# Patient Record
Sex: MALE | Race: WHITE | Employment: FULL TIME | URBAN - METROPOLITAN AREA
[De-identification: names, ages, dates, MRNs, and addresses within clinical notes are randomized per-mention and may not be internally consistent; named-entity substitution may affect disease eponyms.]

---

## 2017-01-04 ENCOUNTER — OFFICE VISIT (OUTPATIENT)
Dept: ENDOCRINOLOGY | Facility: CLINIC | Age: 52
End: 2017-01-04
Payer: COMMERCIAL

## 2017-01-04 VITALS
SYSTOLIC BLOOD PRESSURE: 152 MMHG | WEIGHT: 315 LBS | HEART RATE: 53 BPM | DIASTOLIC BLOOD PRESSURE: 91 MMHG | HEIGHT: 70 IN | BODY MASS INDEX: 45.1 KG/M2

## 2017-01-04 DIAGNOSIS — I10 UNCONTROLLED HYPERTENSION: ICD-10-CM

## 2017-01-04 DIAGNOSIS — E66.01 MORBID OBESITY, UNSPECIFIED OBESITY TYPE (H): Chronic | ICD-10-CM

## 2017-01-04 DIAGNOSIS — I10 ESSENTIAL HYPERTENSION WITH GOAL BLOOD PRESSURE LESS THAN 140/90: ICD-10-CM

## 2017-01-04 DIAGNOSIS — E04.1 THYROID NODULE: Primary | ICD-10-CM

## 2017-01-04 LAB — HBA1C MFR BLD: 7.4 % (ref 0–5.7)

## 2017-01-04 PROCEDURE — 83036 HEMOGLOBIN GLYCOSYLATED A1C: CPT | Performed by: INTERNAL MEDICINE

## 2017-01-04 PROCEDURE — 99214 OFFICE O/P EST MOD 30 MIN: CPT | Performed by: INTERNAL MEDICINE

## 2017-01-04 PROCEDURE — 36415 COLL VENOUS BLD VENIPUNCTURE: CPT | Performed by: INTERNAL MEDICINE

## 2017-01-04 NOTE — MR AVS SNAPSHOT
After Visit Summary   1/4/2017    Kalia Boateng    MRN: [id number]           Patient Information     Date Of Birth          1965        Visit Information        Provider Department      1/4/2017 3:50 PM Gin Ferreira MD; [facility] NURSE [facility]        Today's Diagnoses     Thyroid nodule    -  1     Type 2 diabetes, uncontrolled, with neuropathy (H)         Morbid obesity, unspecified obesity type (H)         Uncontrolled hypertension           Care Instructions    Fax number for Endocrine Clinic [phone].    [place] will schedule thyroid ultrasound for November.           Instructions for Collection of a 24 hour or Timed Urine Specimen    Your doctor has requested that you collect all of your urine for a 24 hour urine lab test. Please follow the instructions below    1. The day before you are to bring your specimen:  At 7:00am (or when you get up the day) empty your bladder as completely as possible. Discard this specimen.    2. During the 24 hour collection period store the collection container in a cool place or in the refrigerator. Some collections require a special preservative that will be in the container if required.    3. At 7:00am the next day (or when you started your urine collection on the previous day) void and add to the collection container. Record the time and date of the end of the collection period. This completes the 24 hour urine collection.    4. Bring the entire specimen directly to the lab  A lab with your name, medical record number and date of birth must be attached to the urine container.  A lab request form completed by your clinic/doctor with your name medical number number, date of birth and test requested must accompany the urine specimen.  Record on the lab request form the date and time (am/pm) of the start of the urine collection and date and time of the end of the urine collection.    5. If you have any questions, feel free to  call the laboratory at [phone] or [phone] or your clinic.      Check BG at bedtime and in the morning. Refrain from having bedtime snacks. Send us the numbers in 1-2 weeks.           Follow-ups after your visit        Your next 10 appointments already scheduled     Jose 15, 2017  8:00 AM   LAB with NL LAB [facility] ([facility])    [address] [place] [id number] [phone]           Patient must bring picture ID.  Patient should be prepared to give a urine specimen  Please do not eat 10-12 hours before your appointment if you are coming in fasting for labs on lipids, cholesterol, or glucose (sugar).  Pregnant women should follow their Care Team instructions. Water with medications is okay. Do not drink coffee or other fluids.   If you have concerns about taking  your medications, please ask at office or if scheduling via [employer], send a message by clicking on Secure Messaging, Message Your Care Team.            Apr 12, 2017  3:50 PM   Return Visit with Gin Ferreira MD, [facility] END[facility] NURSE   [facility]    [address] [place] [id number]   [phone]            Jul 25, 2017  4:30 PM   Return Visit with Malathi Barrera MD   [facility]    [address] [place] [id number]   [phone]              Future tests that were ordered for you today     Open Future Orders        Priority Expected Expires Ordered    Aldosterone Routine 1/11/2017 1/4/2018 1/4/2017    Renin activity Routine 1/11/2017 1/4/2018 1/4/2017    Potassium Routine 1/11/2017 1/4/2018 1/4/2017    Cortisol free urine Routine 1/18/2017 10/31/2017 1/4/2017    Creatinine timed urine Routine 1/18/2017 12/30/2017 1/4/2017    US Thyroid Routine 11/7/2017 1/4/2018 1/4/2017            Who to contact     If you have questions or need follow up information about  "today's clinic visit or your schedule please contact [facility] directly at [phone].  Normal or non-critical lab and imaging results will be communicated to you by [employer], letter or phone within 4 business days after the clinic has received the results. If you do not hear from us within 7 days, please contact the clinic through [employer] or phone. If you have a critical or abnormal lab result, we will notify you by phone as soon as possible.  Submit refill requests through [employer] or call your pharmacy and they will forward the refill request to us. Please allow 3 business days for your refill to be completed.          Additional Information About Your Visit        [employer] Information     [employer] gives you secure access to your electronic health record. If you see a primary care provider, you can also send messages to your care team and make appointments. If you have questions, please call your primary care clinic.  If you do not have a primary care provider, please call [phone] and they will assist you.      [employer] is an electronic gateway that provides easy, online access to your medical records. With [employer], you can request a clinic appointment, read your test results, renew a prescription or communicate with your care team.     To access your existing account, please contact your [facility] or call [phone] for assistance.        Care EveryWhere ID     This is your Care EveryWhere ID. This could be used by other organizations to access your [facility] medical records  [id number]        Your Vitals Were     Pulse Height BMI (Body Mass Index)             53 1.778 m (5' 10\") 47.16 kg/m2          Blood Pressure from Last 3 Encounters:   01/04/17 152/91   11/08/16 130/71   09/27/16 169/89    Weight from Last 3 Encounters:   01/04/17 149.1 kg (328 lb 11.3 oz)   11/08/16 146.013 kg (321 lb 14.4 oz)   09/27/16 147.7 kg (325 lb 9.9 oz)              We " Performed the Following     Hemoglobin A1c POCT          Today's Medication Changes          These changes are accurate as of: 1/4/17  4:42 PM.  If you have any questions, ask your nurse or doctor.               These medicines have changed or have updated prescriptions.        Dose/Directions    * ACCU-CHEK COMPACT PLUS test strip   This may have changed:  Another medication with the same name was added. Make sure you understand how and when to take each.   Used for:  Type 2 diabetes mellitus without complication (H)   Generic drug:  blood glucose monitoring   Changed by:  Gavin House MD        USE TO TEST FOUR TIMES A DAY OR AS DIRECTED   Quantity:  102 each   Refills:  12       * blood glucose monitoring test strip   Commonly known as:  ONE TOUCH VERIO IQ   This may have changed:  You were already taking a medication with the same name, and this prescription was added. Make sure you understand how and when to take each.   Used for:  Type 2 diabetes, uncontrolled, with neuropathy (H)   Changed by:  Gin Ferreira MD        Use to test blood sugars 3-4 times daily or as directed.   Quantity:  350 strip   Refills:  3       * blood glucose monitoring lancets   This may have changed:  Another medication with the same name was added. Make sure you understand how and when to take each.   Used for:  Type 2 diabetes, HbA1C goal < 8% (H)   Changed by:  Gavin House MD        Use to test blood sugar 4 times daily or as directed.   Quantity:  102 each   Refills:  12       * blood glucose monitoring lancets   This may have changed:  You were already taking a medication with the same name, and this prescription was added. Make sure you understand how and when to take each.   Used for:  Type 2 diabetes, uncontrolled, with neuropathy (H)   Changed by:  Gin Ferreira MD        Use to test blood sugars 3-4 times daily or as directed.   Quantity:  1 Box   Refills:  3       insulin degludec 200  UNIT/ML pen   Commonly known as:  TRESIBA   This may have changed:  how much to take   Used for:  Type 2 diabetes mellitus with diabetic autonomic neuropathy, with long-term current use of insulin (H)        Dose:  25 Units   Inject 25 Units Subcutaneous daily   Quantity:  12 mL   Refills:  3       * Notice:  This list has 4 medication(s) that are the same as other medications prescribed for you. Read the directions carefully, and ask your doctor or other care provider to review them with you.         Where to get your medicines      These medications were sent to [address]  [address], [facility] [address]     Phone:  [phone]    - blood glucose monitoring lancets  - blood glucose monitoring test strip             Primary Care Provider Office Phone # Fax #    Scottie Boudreaux [phone]       [facility] [address] [place] [address]   [facility] [address]        Thank you!     Thank you for choosing [facility]  for your care. Our goal is always to provide you with excellent care. Hearing back from our patients is one way we can continue to improve our services. Please take a few minutes to complete the written survey that you may receive in the mail after your visit with us. Thank you!             Your Updated Medication List - Protect others around you: Learn how to safely use, store and throw away your medicines at www.disposemymeds.org.          This list is accurate as of: 1/4/17  4:42 PM.  Always use your most recent med list.                   Brand Name Dispense Instructions for use    * ACCU-CHEK COMPACT PLUS test strip   Generic drug:  blood glucose monitoring     102 each    USE TO TEST FOUR TIMES A DAY OR AS DIRECTED       * blood glucose monitoring test strip    ONE TOUCH VERIO IQ    350 strip    Use to test blood sugars 3-4 times daily or as directed.       allopurinol 300 MG tablet    ZYLOPRIM    90 tablet    Take 1  tablet (300 mg) by mouth daily       aspirin 81 MG EC tablet     90 tablet    Take 1 tablet (81 mg) by mouth daily       * blood glucose monitoring lancets     102 each    Use to test blood sugar 4 times daily or as directed.       * blood glucose monitoring lancets     1 Box    Use to test blood sugars 3-4 times daily or as directed.       blood glucose monitoring meter device kit     1 kit    Use to test blood sugars 4 times daily or as directed.       canagliflozin 300 MG tablet    INVOKANA    90 tablet    Take 1 tablet (300 mg) by mouth every morning (before breakfast)       carvedilol 25 MG tablet    COREG    120 tablet    Take 0.5 tablets (12.5 mg) by mouth 2 times daily (with meals)       chlorthalidone 25 MG tablet    HYGROTON    45 tablet    TAKE 1/2 TABLET BY MOUTH DAILY       colchicine 0.6 MG tablet      Take  by mouth as needed.       cyclobenzaprine 10 MG tablet    FLEXERIL    60 tablet    Take 1 tablet (10 mg) by mouth 3 times daily as needed for muscle spasms       ezetimibe 10 MG tablet    ZETIA    90 tablet    Take 1 tablet (10 mg) by mouth daily       glipiZIDE 10 MG 24 hr tablet    GLUCOTROL XL    180 tablet    TAKE TWO TABLETS BY MOUTH EVERY DAY       hydrALAZINE 50 MG tablet    APRESOLINE    270 tablet    Take 1 tablet (50 mg) by mouth 3 times daily       insulin degludec 200 UNIT/ML pen    TRESIBA    12 mL    Inject 25 Units Subcutaneous daily       lisinopril 40 MG tablet    PRINIVIL/ZESTRIL    90 tablet    TAKE ONE TABLET BY MOUTH ONCE DAILY       metFORMIN 500 MG 24 hr tablet    GLUCOPHAGE-XR    360 tablet    TAKE 4 TABLETS BY MOUTH WITH DINNER       NOVOFINE 30G X 8 MM   Generic drug:  insulin pen needle     100 each    USE ONCE DAILY OR AS DIRECTED MAX USE TWO TIMES A DAY       order for DME     1 Bottle    Equipment being ordered: CONTROL SOLUTION for checking BS.       order for DME     1 Units    Resmed Aircurve 10 auto bilevel 17/11 cm, Mirage Fx Wide nasal mask w/chin strap.        oxyCODONE 5 MG IR tablet    ROXICODONE    14 tablet    Take 1 tablet (5 mg) by mouth At Bedtime       rosuvastatin 40 MG tablet    CRESTOR    90 tablet    Take 1 tablet (40 mg) by mouth daily       TYLENOL PO      Take 1,500 mg by mouth 2 times daily       * Notice:  This list has 4 medication(s) that are the same as other medications prescribed for you. Read the directions carefully, and ask your doctor or other care provider to review them with you.
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
## 2017-01-04 NOTE — Clinical Note
Please let him know I changed the prescriptions according to his new coverage: crestor with atorvastatin 40 mg daily, invokana with jardiance 25 mg daily and BD needles.
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
## 2017-01-04 NOTE — PATIENT INSTRUCTIONS
Fax number for Endocrine Clinic [phone].    Selma will schedule thyroid ultrasound for November.     Discontinue           Instructions for Collection of a 24 hour or Timed Urine Specimen    Your doctor has requested that you collect all of your urine for a 24 hour urine lab test. Please follow the instructions below    1. The day before you are to bring your specimen:  At 7:00am (or when you get up the day) empty your bladder as completely as possible. Discard this specimen.    2. During the 24 hour collection period store the collection container in a cool place or in the refrigerator. Some collections require a special preservative that will be in the container if required.    3. At 7:00am the next day (or when you started your urine collection on the previous day) void and add to the collection container. Record the time and date of the end of the collection period. This completes the 24 hour urine collection.    4. Bring the entire specimen directly to the lab  A lab with your name, medical record number and date of birth must be attached to the urine container.  A lab request form completed by your clinic/doctor with your name medical number number, date of birth and test requested must accompany the urine specimen.  Record on the lab request form the date and time (am/pm) of the start of the urine collection and date and time of the end of the urine collection.    5. If you have any questions, feel free to call the laboratory at [phone] or [phone] or your clinic.      Check BG at bedtime and in the morning. Refrain from having bedtime snacks. Send us the numbers in 1-2 weeks.

## 2017-01-04 NOTE — NURSING NOTE
"Kalia Boateng's goals for this visit include: Follow up Diabetes  He requests these members of his care team be copied on today's visit information: YES    PCP: Scottie Boudreaux    Referring Provider:  Scottie Boudreaux MD  [facility]  [address] [place] [address]    Chief Complaint   Patient presents with     Diabetes       Initial Ht 1.778 m (5' 10\")  Wt 149.1 kg (328 lb 11.3 oz)  BMI 47.16 kg/m2 Estimated body mass index is 47.16 kg/(m^2) as calculated from the following:    Height as of this encounter: 1.778 m (5' 10\").    Weight as of this encounter: 149.1 kg (328 lb 11.3 oz).  BP completed using cuff size: large    Do you need any medication refills at today's visit? NO    "

## 2017-01-06 RX ORDER — CHLORTHALIDONE 25 MG/1
25 TABLET ORAL DAILY
Qty: 90 TABLET | Refills: 3 | Status: SHIPPED | OUTPATIENT
Start: 2017-01-06 | End: 2018-01-23

## 2017-01-10 ENCOUNTER — TELEPHONE (OUTPATIENT)
Dept: ENDOCRINOLOGY | Facility: CLINIC | Age: 52
End: 2017-01-10

## 2017-01-10 RX ORDER — ATORVASTATIN CALCIUM 40 MG/1
40 TABLET, FILM COATED ORAL DAILY
Qty: 90 TABLET | Refills: 3 | Status: SHIPPED | OUTPATIENT
Start: 2017-01-10 | End: 2018-01-23

## 2017-01-10 NOTE — TELEPHONE ENCOUNTER
Received message from Dr. Ferreira as follows:    Please let him know I changed the prescriptions according to his new coverage: crestor with atorvastatin 40 mg daily, invokana with jardiance 25 mg daily and BD needles.       Patient advised. Patient verbalizes understanding and agrees to plan. Patient has a supply of the Crestor currently and will finish out that first before switching to the atorvastatin 40 mg daily.       Selma Stevenson RN  Endocrine Care Coordinator  [facility]

## 2017-01-15 DIAGNOSIS — I10 UNCONTROLLED HYPERTENSION: ICD-10-CM

## 2017-01-15 DIAGNOSIS — E66.01 MORBID OBESITY, UNSPECIFIED OBESITY TYPE (H): Chronic | ICD-10-CM

## 2017-01-15 LAB
COLLECT DURATION TIME UR: 24 H
CREAT 24H UR-MRATE: 1.27 G/(24.H) (ref 1–2)
CREAT UR-MCNC: 64 MG/DL
POTASSIUM SERPL-SCNC: 3.5 MMOL/L (ref 3.4–5.3)
SPECIMEN VOL UR: 2975 ML

## 2017-01-15 PROCEDURE — 84132 ASSAY OF SERUM POTASSIUM: CPT | Performed by: INTERNAL MEDICINE

## 2017-01-15 PROCEDURE — 99000 SPECIMEN HANDLING OFFICE-LAB: CPT | Performed by: INTERNAL MEDICINE

## 2017-01-15 PROCEDURE — 82570 ASSAY OF URINE CREATININE: CPT | Performed by: INTERNAL MEDICINE

## 2017-01-15 PROCEDURE — 82530 CORTISOL FREE: CPT | Mod: 90 | Performed by: INTERNAL MEDICINE

## 2017-01-15 PROCEDURE — 84244 ASSAY OF RENIN: CPT | Mod: 90 | Performed by: INTERNAL MEDICINE

## 2017-01-15 PROCEDURE — 36415 COLL VENOUS BLD VENIPUNCTURE: CPT | Performed by: INTERNAL MEDICINE

## 2017-01-15 PROCEDURE — 82088 ASSAY OF ALDOSTERONE: CPT | Mod: 90 | Performed by: INTERNAL MEDICINE

## 2017-01-15 PROCEDURE — 81050 URINALYSIS VOLUME MEASURE: CPT | Performed by: INTERNAL MEDICINE

## 2017-01-16 ENCOUNTER — OFFICE VISIT (OUTPATIENT)
Dept: FAMILY MEDICINE | Facility: CLINIC | Age: 52
End: 2017-01-16
Payer: COMMERCIAL

## 2017-01-16 VITALS
WEIGHT: 315 LBS | SYSTOLIC BLOOD PRESSURE: 112 MMHG | RESPIRATION RATE: 16 BRPM | BODY MASS INDEX: 46.17 KG/M2 | HEART RATE: 64 BPM | DIASTOLIC BLOOD PRESSURE: 60 MMHG | TEMPERATURE: 98.7 F

## 2017-01-16 DIAGNOSIS — Z79.4 TYPE 2 DIABETES MELLITUS WITH DIABETIC AUTONOMIC NEUROPATHY, WITH LONG-TERM CURRENT USE OF INSULIN (H): ICD-10-CM

## 2017-01-16 DIAGNOSIS — E66.01 MORBID OBESITY, UNSPECIFIED OBESITY TYPE (H): Chronic | ICD-10-CM

## 2017-01-16 DIAGNOSIS — R22.0 MASS OF FACE: Primary | ICD-10-CM

## 2017-01-16 DIAGNOSIS — E11.43 TYPE 2 DIABETES MELLITUS WITH DIABETIC AUTONOMIC NEUROPATHY, WITH LONG-TERM CURRENT USE OF INSULIN (H): ICD-10-CM

## 2017-01-16 DIAGNOSIS — G47.33 OSA (OBSTRUCTIVE SLEEP APNEA): Chronic | ICD-10-CM

## 2017-01-16 PROCEDURE — 99214 OFFICE O/P EST MOD 30 MIN: CPT | Performed by: FAMILY MEDICINE

## 2017-01-16 ASSESSMENT — PAIN SCALES - GENERAL: PAINLEVEL: NO PAIN (0)

## 2017-01-16 NOTE — NURSING NOTE
"Chief Complaint   Patient presents with     Lesion     consult for removal       Initial /60 mmHg  Pulse 64  Temp(Src) 98.7  F (37.1  C) (Tympanic)  Resp 16  Wt 321 lb 12 oz (145.945 kg) Estimated body mass index is 46.17 kg/(m^2) as calculated from the following:    Height as of 1/4/17: 5' 10\" (1.778 m).    Weight as of this encounter: 321 lb 12 oz (145.945 kg).  BP completed using cuff size: mathieu Bernal CMA (AAMA)   "

## 2017-01-16 NOTE — MR AVS SNAPSHOT
After Visit Summary   1/16/2017    Kalia Boateng    MRN: [id number]           Patient Information     Date Of Birth          1965        Visit Information        Provider Department      1/16/2017 3:45 PM Scottie Boudreaux MD [facility]        Today's Diagnoses     Mass of face    -  1     CYRIL (obstructive sleep apnea) Severe            Follow-ups after your visit        Additional Services     GENERAL SURG ADULT REFERRAL       Your provider has referred you to: [facility]: [facility] [phone]   [email or web address]    Please be aware that coverage of these services is subject to the terms and limitations of your health insurance plan.  Call member services at your health plan with any benefit or coverage questions.      Please bring the following with you to your appointment:    (1) Any X-Rays, CTs or MRIs which have been performed.  Contact the facility where they were done to arrange for  prior to your scheduled appointment.   (2) List of current medications   (3) This referral request   (4) Any documents/labs given to you for this referral                  Your next 10 appointments already scheduled     Jan 17, 2017  4:00 PM   New Visit with Haritha Newby MD   [facility] ([facility])    [address] [address]  [facility] [id number] [phone]            Apr 12, 2017  3:50 PM   Return Visit with Gin Ferreira MD, MG ENDO NURSE   [facility]    [address] [facility] [address] [phone]            Jul 25, 2017  4:30 PM   Return Visit with Malathi Barrera MD   [facility] ([facility])    [address] [facility] [address] [phone]              Who to contact     If you have questions or need follow up information about today's clinic visit  or your schedule please contact [facility] directly at [phone].  Normal or non-critical lab and imaging results will be communicated to you by [employer], letter or phone within 4 business days after the clinic has received the results. If you do not hear from us within 7 days, please contact the clinic through [employer] or phone. If you have a critical or abnormal lab result, we will notify you by phone as soon as possible.  Submit refill requests through [employer] or call your pharmacy and they will forward the refill request to us. Please allow 3 business days for your refill to be completed.          Additional Information About Your Visit        [employer] Information     [employer] gives you secure access to your electronic health record. If you see a primary care provider, you can also send messages to your care team and make appointments. If you have questions, please call your primary care clinic.  If you do not have a primary care provider, please call [phone] and they will assist you.        Care EveryWhere ID     This is your Care EveryWhere ID. This could be used by other organizations to access your [place] medical records  [id number]        Your Vitals Were     Pulse Temperature Respirations             64 98.7  F (37.1  C) (Tympanic) 16          Blood Pressure from Last 3 Encounters:   01/16/17 112/60   01/04/17 152/91   11/08/16 130/71    Weight from Last 3 Encounters:   01/16/17 321 lb 12 oz (145.945 kg)   01/04/17 328 lb 11.3 oz (149.1 kg)   11/08/16 321 lb 14.4 oz (146.013 kg)              We Performed the Following     GENERAL SURG ADULT REFERRAL          Today's Medication Changes          These changes are accurate as of: 1/16/17  4:37 PM.  If you have any questions, ask your nurse or doctor.               These medicines have changed or have updated prescriptions.        Dose/Directions    insulin degludec 200 UNIT/ML pen   Commonly known as:  TRESIBA   This may have changed:   how much to take   Used for:  Type 2 diabetes mellitus with diabetic autonomic neuropathy, with long-term current use of insulin (H)        Dose:  25 Units   Inject 25 Units Subcutaneous daily   Quantity:  12 mL   Refills:  3         Stop taking these medicines if you haven't already. Please contact your care team if you have questions.     ezetimibe 10 MG tablet   Commonly known as:  ZETIA   Stopped by:  Scottie Boudreaux MD                    Primary Care Provider Office Phone # Fax #    Scottie Boudreaux [phone]       [address] [place] [address]        Thank you!     Thank you for choosing [facility]  for your care. Our goal is always to provide you with excellent care. Hearing back from our patients is one way we can continue to improve our services. Please take a few minutes to complete the written survey that you may receive in the mail after your visit with us. Thank you!             Your Updated Medication List - Protect others around you: Learn how to safely use, store and throw away your medicines at www.disposemymeds.org.          This list is accurate as of: 1/16/17  4:37 PM.  Always use your most recent med list.                   Brand Name Dispense Instructions for use    * ACCU-CHEK COMPACT PLUS test strip   Generic drug:  blood glucose monitoring     102 each    USE TO TEST FOUR TIMES A DAY OR AS DIRECTED       * blood glucose monitoring test strip    ONE TOUCH VERIO IQ    350 strip    Use to test blood sugars 3-4 times daily or as directed.       allopurinol 300 MG tablet    ZYLOPRIM    90 tablet    Take 1 tablet (300 mg) by mouth daily       aspirin 81 MG EC tablet     90 tablet    Take 1 tablet (81 mg) by mouth daily       atorvastatin 40 MG tablet    LIPITOR    90 tablet    Take 1 tablet (40 mg) by mouth daily       * blood glucose monitoring lancets     102 each    Use to test blood sugar 4 times daily or as  directed.       * blood glucose monitoring lancets     1 Box    Use to test blood sugars 3-4 times daily or as directed.       blood glucose monitoring meter device kit     1 kit    Use to test blood sugars 4 times daily or as directed.       carvedilol 25 MG tablet    COREG    120 tablet    Take 0.5 tablets (12.5 mg) by mouth 2 times daily (with meals)       chlorthalidone 25 MG tablet    HYGROTON    90 tablet    Take 1 tablet (25 mg) by mouth daily       colchicine 0.6 MG tablet      Take  by mouth as needed.       cyclobenzaprine 10 MG tablet    FLEXERIL    60 tablet    Take 1 tablet (10 mg) by mouth 3 times daily as needed for muscle spasms       empagliflozin 25 MG Tabs tablet    JARDIANCE    90 tablet    Take 1 tablet (25 mg) by mouth daily       glipiZIDE 10 MG 24 hr tablet    GLUCOTROL XL    180 tablet    TAKE TWO TABLETS BY MOUTH EVERY DAY       hydrALAZINE 50 MG tablet    APRESOLINE    270 tablet    Take 1 tablet (50 mg) by mouth 3 times daily       insulin degludec 200 UNIT/ML pen    TRESIBA    12 mL    Inject 25 Units Subcutaneous daily       insulin pen needle 31G X 8 MM    B-D U/F    100 each    Use once daily or as directed.       lisinopril 40 MG tablet    PRINIVIL/ZESTRIL    90 tablet    TAKE ONE TABLET BY MOUTH ONCE DAILY       metFORMIN 500 MG 24 hr tablet    GLUCOPHAGE-XR    360 tablet    TAKE 4 TABLETS BY MOUTH WITH DINNER       order for DME     1 Bottle    Equipment being ordered: CONTROL SOLUTION for checking BS.       order for DME     1 Units    Resmed Aircurve 10 auto bilevel 17/11 cm, Mirage Fx Wide nasal mask w/chin strap.       oxyCODONE 5 MG IR tablet    ROXICODONE    14 tablet    Take 1 tablet (5 mg) by mouth At Bedtime       TYLENOL PO      Take 1,500 mg by mouth 2 times daily       * Notice:  This list has 4 medication(s) that are the same as other medications prescribed for you. Read the directions carefully, and ask your doctor or other care provider to review them with you.

## 2017-01-16 NOTE — PROGRESS NOTES
SUBJECTIVE:                                                    Kalia Boateng is a 51 year old male who presents to clinic today for the following health issues:      Chief Complaint   Patient presents with     Lesion     consult for removal       Problem list and histories reviewed & adjusted, as indicated.  Additional history: as documented    Normally has mass on right side of face on medial side of cheek where his new CPAP mask seals.  Last week used larger full face CPAP mask to replace nose CPAP device (nose device was not working).  After 1-2 nights, the mass on his face enlarged in size.  He stopped using CPAP mask and squeezed lesion and got some clear discharge from the lesion.  After 4 days, the mass went back to pre-CPAP mask size.  He used mask again last night and mass enlarged in size again to the same size as last week.      Mass has been present since age 16.      History of diabetes     A1C      7.4   1/4/2017  A1C      7.8   9/27/2016  A1C      8.3   6/22/2016  A1C      8.2   6/21/2016  A1C      7.7   2/17/2016     ROS:  General: negative for, fever, chills  Skin: as above  Resp: No shortness of breath, dyspnea on exertion, cough, or hemoptysis  GI: negative for, nausea and vomiting    OBJECTIVE:                                                    /60 mmHg  Pulse 64  Temp(Src) 98.7  F (37.1  C) (Tympanic)  Resp 16  Wt 321 lb 12 oz (145.945 kg)  Body mass index is 46.17 kg/(m^2).  GENERAL APPEARANCE: morbidly obese, alert and no distress  RESP: lungs clear to auscultation - no rales, rhonchi or wheezes  CV: regular rates and rhythm, normal S1 S2, no S3 or S4 and no murmur, click or rub  SKIN: nodular lesion on right side of face just lateral to upper lip and in area of where CPAP mask would fit.  No discharge noted.            ASSESSMENT/PLAN:                                                        ICD-10-CM    1. Mass of face R22.0 GENERAL SURG ADULT REFERRAL   2. CYRIL (obstructive sleep  apnea) Severe G47.33      PLAN:  1.  This appears to be lesion that will need evaluation from general surgery for evaluation and excision.  I explained to patient that this is more complicated than what primary care should handle and will take surgical expertise.  Referral made.   2.  He should contact sleep clinic to discuss alternatives to his current CPAP mask.     Scottie Boudreaux MD   [facility]

## 2017-01-17 ENCOUNTER — OFFICE VISIT (OUTPATIENT)
Dept: SURGERY | Facility: CLINIC | Age: 52
End: 2017-01-17
Payer: COMMERCIAL

## 2017-01-17 VITALS — BODY MASS INDEX: 45.1 KG/M2 | HEIGHT: 70 IN | WEIGHT: 315 LBS | TEMPERATURE: 98.3 F

## 2017-01-17 DIAGNOSIS — R22.0 LUMP ON FACE: Primary | ICD-10-CM

## 2017-01-17 LAB — ALDOST SERPL-MCNC: 15.7 NG/DL

## 2017-01-17 PROCEDURE — 99242 OFF/OP CONSLTJ NEW/EST SF 20: CPT | Performed by: SURGERY

## 2017-01-17 RX ORDER — CEPHALEXIN 500 MG/1
500 CAPSULE ORAL 3 TIMES DAILY
Qty: 30 CAPSULE | Refills: 0 | Status: SHIPPED | OUTPATIENT
Start: 2017-01-17 | End: 2017-04-12

## 2017-01-17 NOTE — NURSING NOTE
"Chief Complaint   Patient presents with     Consult     mass on right face       Initial Temp(Src) 98.3  F (36.8  C) (Skin)  Ht 5' 10\" (1.778 m)  Wt 322 lb (146.058 kg)  BMI 46.20 kg/m2 Estimated body mass index is 46.2 kg/(m^2) as calculated from the following:    Height as of this encounter: 5' 10\" (1.778 m).    Weight as of this encounter: 322 lb (146.058 kg).  BP completed using cuff size: NA (Not Taken)  Clive GALAVIZ CMA      "

## 2017-01-17 NOTE — MR AVS SNAPSHOT
After Visit Summary   1/17/2017    Kalia Boateng    MRN: [id number]           Patient Information     Date Of Birth          1965        Visit Information        Provider Department      1/17/2017 4:00 PM Haritha Newby MD [facility]        Today's Diagnoses     Lump on face    -  1        Follow-ups after your visit        Your next 10 appointments already scheduled     Jan 23, 2017 11:45 AM   Return Visit with Haritha Newby MD   [facility] ([facility]    [address] [place] [address]   [phone]            Apr 12, 2017  3:50 PM   Return Visit with Gin Ferreira MD, MG ENDO NURSE   [facility]    [address] [place] [address] [phone]            Jul 25, 2017  4:30 PM   Return Visit with Malathi Barrera MD   [facility]    [address] [place] [address] [phone]              Who to contact     If you have questions or need follow up information about today's clinic visit or your schedule please contact [facility] directly at [phone].  Normal or non-critical lab and imaging results will be communicated to you by MyChart, letter or phone within 4 business days after the clinic has received the results. If you do not hear from us within 7 days, please contact the clinic through MyChart or phone. If you have a critical or abnormal lab result, we will notify you by phone as soon as possible.  Submit refill requests through [employer] or call your pharmacy and they will forward the refill request to us. Please allow 3 business days for your refill to be completed.          Additional Information About Your Visit        [employer] Information     [employer] gives you secure access to your electronic health record. If you see a primary care provider, you can  "also send messages to your care team and make appointments. If you have questions, please call your primary care clinic.  If you do not have a primary care provider, please call [phone] and they will assist you.        Care EveryWhere ID     This is your Care EveryWhere ID. This could be used by other organizations to access your [place] medical records  [id number]        Your Vitals Were     Temperature Height BMI (Body Mass Index)             98.3  F (36.8  C) (Skin) 5' 10\" (1.778 m) 46.20 kg/m2          Blood Pressure from Last 3 Encounters:   01/16/17 112/60   01/04/17 152/91   11/08/16 130/71    Weight from Last 3 Encounters:   01/17/17 322 lb (146.058 kg)   01/16/17 321 lb 12 oz (145.945 kg)   01/04/17 328 lb 11.3 oz (149.1 kg)              Today, you had the following     No orders found for display         Today's Medication Changes          These changes are accurate as of: 1/17/17  4:36 PM.  If you have any questions, ask your nurse or doctor.               Start taking these medicines.        Dose/Directions    cephALEXin 500 MG capsule   Commonly known as:  KEFLEX   Used for:  Lump on face        Dose:  500 mg   Take 1 capsule (500 mg) by mouth 3 times daily   Quantity:  30 capsule   Refills:  0         These medicines have changed or have updated prescriptions.        Dose/Directions    insulin degludec 200 UNIT/ML pen   Commonly known as:  TRESIBA   This may have changed:  how much to take   Used for:  Type 2 diabetes mellitus with diabetic autonomic neuropathy, with long-term current use of insulin (H)        Dose:  25 Units   Inject 25 Units Subcutaneous daily   Quantity:  12 mL   Refills:  3            Where to get your medicines      These medications were sent to [facility] - [place], [place] - [address]  [address], [facility] [address]     Phone:  [phone]    - cephALEXin 500 MG capsule             Primary Care Provider Office Phone # Fax #    Scottie Boudreaux MD " [phone]       [facility] [place] [address] [place] [address]        Thank you!     Thank you for choosing [facility]  for your care. Our goal is always to provide you with excellent care. Hearing back from our patients is one way we can continue to improve our services. Please take a few minutes to complete the written survey that you may receive in the mail after your visit with us. Thank you!             Your Updated Medication List - Protect others around you: Learn how to safely use, store and throw away your medicines at www.disposemymeds.org.          This list is accurate as of: 1/17/17  4:36 PM.  Always use your most recent med list.                   Brand Name Dispense Instructions for use    * ACCU-CHEK COMPACT PLUS test strip   Generic drug:  blood glucose monitoring     102 each    USE TO TEST FOUR TIMES A DAY OR AS DIRECTED       * blood glucose monitoring test strip    ONE TOUCH VERIO IQ    350 strip    Use to test blood sugars 3-4 times daily or as directed.       allopurinol 300 MG tablet    ZYLOPRIM    90 tablet    Take 1 tablet (300 mg) by mouth daily       aspirin 81 MG EC tablet     90 tablet    Take 1 tablet (81 mg) by mouth daily       atorvastatin 40 MG tablet    LIPITOR    90 tablet    Take 1 tablet (40 mg) by mouth daily       * blood glucose monitoring lancets     102 each    Use to test blood sugar 4 times daily or as directed.       * blood glucose monitoring lancets     1 Box    Use to test blood sugars 3-4 times daily or as directed.       blood glucose monitoring meter device kit     1 kit    Use to test blood sugars 4 times daily or as directed.       carvedilol 25 MG tablet    COREG    120 tablet    Take 0.5 tablets (12.5 mg) by mouth 2 times daily (with meals)       cephALEXin 500 MG capsule    KEFLEX    30 capsule    Take 1 capsule (500 mg) by mouth 3 times daily       chlorthalidone 25 MG tablet    HYGROTON    90 tablet    Take 1  tablet (25 mg) by mouth daily       colchicine 0.6 MG tablet      Take  by mouth as needed.       cyclobenzaprine 10 MG tablet    FLEXERIL    60 tablet    Take 1 tablet (10 mg) by mouth 3 times daily as needed for muscle spasms       empagliflozin 25 MG Tabs tablet    JARDIANCE    90 tablet    Take 1 tablet (25 mg) by mouth daily       glipiZIDE 10 MG 24 hr tablet    GLUCOTROL XL    180 tablet    TAKE TWO TABLETS BY MOUTH EVERY DAY       hydrALAZINE 50 MG tablet    APRESOLINE    270 tablet    Take 1 tablet (50 mg) by mouth 3 times daily       insulin degludec 200 UNIT/ML pen    TRESIBA    12 mL    Inject 25 Units Subcutaneous daily       insulin pen needle 31G X 8 MM    B-D U/F    100 each    Use once daily or as directed.       lisinopril 40 MG tablet    PRINIVIL/ZESTRIL    90 tablet    TAKE ONE TABLET BY MOUTH ONCE DAILY       metFORMIN 500 MG 24 hr tablet    GLUCOPHAGE-XR    360 tablet    TAKE 4 TABLETS BY MOUTH WITH DINNER       order for DME     1 Bottle    Equipment being ordered: CONTROL SOLUTION for checking BS.       order for DME     1 Units    Resmed Aircurve 10 auto bilevel 17/11 cm, Mirage Fx Wide nasal mask w/chin strap.       oxyCODONE 5 MG IR tablet    ROXICODONE    14 tablet    Take 1 tablet (5 mg) by mouth At Bedtime       TYLENOL PO      Take 1,500 mg by mouth 2 times daily       * Notice:  This list has 4 medication(s) that are the same as other medications prescribed for you. Read the directions carefully, and ask your doctor or other care provider to review them with you.

## 2017-01-18 LAB
COLLECT DURATION TIME SPEC: 24 H
CORTIS F 24H UR HPLC-MCNC: 9.57 UG/ML
CORTIS F 24H UR-MRATE: 28.5
CORTIS F/CREAT 24H UR: 16.5
CREAT 24H UR-MCNC: 58 MG/DL
CREAT 24H UR-MRATE: 1726 G/(24.H)
IMP & REVIEW OF LAB RESULTS: NORMAL
RENIN PLAS-CCNC: 0.7 NG/ML/H
SPECIMEN VOL ?TM UR: 2975 ML

## 2017-01-22 PROBLEM — Z79.4 TYPE 2 DIABETES MELLITUS WITH DIABETIC AUTONOMIC NEUROPATHY, WITH LONG-TERM CURRENT USE OF INSULIN (H): Status: ACTIVE | Noted: 2017-01-22

## 2017-01-22 PROBLEM — E11.43 TYPE 2 DIABETES MELLITUS WITH DIABETIC AUTONOMIC NEUROPATHY, WITH LONG-TERM CURRENT USE OF INSULIN (H): Status: ACTIVE | Noted: 2017-01-22

## 2017-01-23 ENCOUNTER — OFFICE VISIT (OUTPATIENT)
Dept: SURGERY | Facility: CLINIC | Age: 52
End: 2017-01-23
Payer: COMMERCIAL

## 2017-01-23 ENCOUNTER — TELEPHONE (OUTPATIENT)
Dept: SURGERY | Facility: OTHER | Age: 52
End: 2017-01-23

## 2017-01-23 VITALS — WEIGHT: 315 LBS | OXYGEN SATURATION: 96 % | TEMPERATURE: 96.8 F | BODY MASS INDEX: 46.06 KG/M2 | HEART RATE: 93 BPM

## 2017-01-23 DIAGNOSIS — L98.9 LESION OF SKIN OF FACE: Primary | ICD-10-CM

## 2017-01-23 PROCEDURE — 99203 OFFICE O/P NEW LOW 30 MIN: CPT | Performed by: SPECIALIST

## 2017-01-23 NOTE — NURSING NOTE
"    Chief Complaint   Patient presents with     Lesion     facial lesion, possible mole     Consult     referring pau       Initial Pulse 93  Temp(Src) 96.8  F (36  C) (Temporal)  Wt 321 lb (145.605 kg)  SpO2 96% Estimated body mass index is 46.06 kg/(m^2) as calculated from the following:    Height as of 1/17/17: 5' 10\" (1.778 m).    Weight as of this encounter: 321 lb (145.605 kg)..  BP completed using cuff size: NA (Not Taken)      Ijeoma Griffith CMA  (AAMA)      "

## 2017-01-23 NOTE — NURSING NOTE
[facility]    Kalia Boateng has been given the following teaching information:    Instructions for Showering or Bathing before Surgery  Request for surgery sheet faxed to [facility] at [facility]

## 2017-01-23 NOTE — MR AVS SNAPSHOT
After Visit Summary   1/23/2017    Kalia Boateng    MRN: [id number]           Patient Information     Date Of Birth          1965        Visit Information        Provider Department      1/23/2017 1:30 PM Bhanu Graham MD [facility]        Today's Diagnoses     Lesion of skin of face    -  1        Follow-ups after your visit        Your next 10 appointments already scheduled     Jan 25, 2017  4:30 PM   Return Visit with Bhanu Graham MD   [facility]    [address] [address]   [phone]            Apr 12, 2017  3:50 PM   Return Visit with Gin Ferreira MD, MG ENDO NURSE   [facility]    [address] [place] [address] [phone]            Jul 25, 2017  4:30 PM   Return Visit with Malathi Barrera MD   [facility] ([facility]    [address] [place] [address] [phone]              Who to contact     If you have questions or need follow up information about today's clinic visit or your schedule please contact [facility] directly at [phone].  Normal or non-critical lab and imaging results will be communicated to you by MyChart, letter or phone within 4 business days after the clinic has received the results. If you do not hear from us within 7 days, please contact the clinic through MyChart or phone. If you have a critical or abnormal lab result, we will notify you by phone as soon as possible.  Submit refill requests through [email or web address] or call your pharmacy and they will forward the refill request to us. Please allow 3 business days for your refill to be completed.          Additional Information About Your Visit        [employer] Information     [email or web address] gives you secure access to your electronic health record. If you see a primary care provider, you  can also send messages to your care team and make appointments. If you have questions, please call your primary care clinic.  If you do not have a primary care provider, please call [phone] and they will assist you.        Care EveryWhere ID     This is your Care EveryWhere ID. This could be used by other organizations to access your [place] medical records  [id number]        Your Vitals Were     Pulse Temperature Pulse Oximetry             93 96.8  F (36  C) (Temporal) 96%          Blood Pressure from Last 3 Encounters:   01/16/17 112/60   01/04/17 152/91   11/08/16 130/71    Weight from Last 3 Encounters:   01/23/17 321 lb (145.605 kg)   01/17/17 322 lb (146.058 kg)   01/16/17 321 lb 12 oz (145.945 kg)              Today, you had the following     No orders found for display         Today's Medication Changes          These changes are accurate as of: 1/23/17  1:59 PM.  If you have any questions, ask your nurse or doctor.               These medicines have changed or have updated prescriptions.        Dose/Directions    insulin degludec 200 UNIT/ML pen   Commonly known as:  TRESIBA   This may have changed:  how much to take   Used for:  Type 2 diabetes mellitus with diabetic autonomic neuropathy, with long-term current use of insulin (H)        Dose:  25 Units   Inject 25 Units Subcutaneous daily   Quantity:  12 mL   Refills:  3                Primary Care Provider Office Phone # Fax #    Scottie Boudreaux [phone]       [facility] [place] [address]   [place] [address]        Thank you!     Thank you for choosing [facility]  for your care. Our goal is always to provide you with excellent care. Hearing back from our patients is one way we can continue to improve our services. Please take a few minutes to complete the written survey that you may receive in the mail after your visit with us. Thank you!             Your Updated Medication List - Protect  others around you: Learn how to safely use, store and throw away your medicines at www.disposemymeds.org.          This list is accurate as of: 1/23/17  1:59 PM.  Always use your most recent med list.                   Brand Name Dispense Instructions for use    * ACCU-CHEK COMPACT PLUS test strip   Generic drug:  blood glucose monitoring     102 each    USE TO TEST FOUR TIMES A DAY OR AS DIRECTED       * blood glucose monitoring test strip    ONE TOUCH VERIO IQ    350 strip    Use to test blood sugars 3-4 times daily or as directed.       allopurinol 300 MG tablet    ZYLOPRIM    90 tablet    Take 1 tablet (300 mg) by mouth daily       aspirin 81 MG EC tablet     90 tablet    Take 1 tablet (81 mg) by mouth daily       atorvastatin 40 MG tablet    LIPITOR    90 tablet    Take 1 tablet (40 mg) by mouth daily       * blood glucose monitoring lancets     102 each    Use to test blood sugar 4 times daily or as directed.       * blood glucose monitoring lancets     1 Box    Use to test blood sugars 3-4 times daily or as directed.       blood glucose monitoring meter device kit     1 kit    Use to test blood sugars 4 times daily or as directed.       carvedilol 25 MG tablet    COREG    120 tablet    Take 0.5 tablets (12.5 mg) by mouth 2 times daily (with meals)       cephALEXin 500 MG capsule    KEFLEX    30 capsule    Take 1 capsule (500 mg) by mouth 3 times daily       chlorthalidone 25 MG tablet    HYGROTON    90 tablet    Take 1 tablet (25 mg) by mouth daily       colchicine 0.6 MG tablet      Take  by mouth as needed.       cyclobenzaprine 10 MG tablet    FLEXERIL    60 tablet    Take 1 tablet (10 mg) by mouth 3 times daily as needed for muscle spasms       empagliflozin 25 MG Tabs tablet    JARDIANCE    90 tablet    Take 1 tablet (25 mg) by mouth daily       glipiZIDE 10 MG 24 hr tablet    GLUCOTROL XL    180 tablet    TAKE TWO TABLETS BY MOUTH EVERY DAY       hydrALAZINE 50 MG tablet    APRESOLINE    270 tablet     Take 1 tablet (50 mg) by mouth 3 times daily       insulin degludec 200 UNIT/ML pen    TRESIBA    12 mL    Inject 25 Units Subcutaneous daily       insulin pen needle 31G X 8 MM    B-D U/F    100 each    Use once daily or as directed.       lisinopril 40 MG tablet    PRINIVIL/ZESTRIL    90 tablet    TAKE ONE TABLET BY MOUTH ONCE DAILY       metFORMIN 500 MG 24 hr tablet    GLUCOPHAGE-XR    360 tablet    TAKE 4 TABLETS BY MOUTH WITH DINNER       order for DME     1 Bottle    Equipment being ordered: CONTROL SOLUTION for checking BS.       order for DME     1 Units    Resmed Aircurve 10 auto bilevel 17/11 cm, Mirage Fx Wide nasal mask w/chin strap.       oxyCODONE 5 MG IR tablet    ROXICODONE    14 tablet    Take 1 tablet (5 mg) by mouth At Bedtime       TYLENOL PO      Take 1,500 mg by mouth 2 times daily       * Notice:  This list has 4 medication(s) that are the same as other medications prescribed for you. Read the directions carefully, and ask your doctor or other care provider to review them with you.

## 2017-01-23 NOTE — TELEPHONE ENCOUNTER
Surgery Scheduled    Date of Surgery 01/24/17 Time of Surgery 2:15pm  Procedure: Excision Right Facial Lesion  Hospital/Surgical Facility: [facility]  Surgeon: Dr Graham  Type of Anesthesia Anticipated: Local  Pre-Op: not needed   Post-Op: 02/02/17 with Dr Graham  Pre-Certification - to be completed  Consent Signed - to be completed  Hospital Stay - same day procedure    Surgery Packet (and/or) Colonscopy Prep (was given/or mailed) to patient. Patient was also instructed to arrive 1 hour(s) prior to surgery.  Patient understood and agrees to the plan.      Abril Taylor  Specialty

## 2017-01-23 NOTE — PROGRESS NOTES
Consult requested by Dr. Boudreaux    Reason for consultation - skin face lesion    HPI:  Patient is a 51-year-old white male with a long-standing history of a right facial skin lesion that has been present since he was a teenager.  Recently he was started on BiPAP and the lesion became irritated and occasionally bleeds. He feels a BiPAP mask irritated skin lesion. He would like to have it excised.  He was originally scheduled with Dr. Newby but had to be cancelled.      Past Medical History   Diagnosis Date     NONSPECIFIC MEDICAL HISTORY 1980     Jaw fracture     Osteoarthrosis, unspecified whether generalized or localized, lower leg      left knee     Type II or unspecified type diabetes mellitus without mention of complication, not stated as uncontrolled      Gout      Essential hypertension      Other and unspecified hyperlipidemia      Proteinuria      Diabetic eye exam (H) 09/07/11     CYRIL (obstructive sleep apnea) 2011     Closed fracture of shaft of left humerus 7/27/2015      Diagnosis updated by automated process. Provider to review and confirm.     Dysfunction of left rotator cuff 10/9/2015     Closed fracture of shaft of left humerus with routine healing 10/9/2015      Diagnosis updated by automated process. Provider to review and confirm.     AC joint arthropathy 11/6/2015     Past Surgical History   Procedure Laterality Date     Hc vasectomy unilat/bilat w postop semen  1991     Vasectomy     Hc repair rotator cuff,acute  1990's     Rt Rotator Cuff Repair     Hc knee scope, diagnostic       Arthroscopy, Knee     Hc exc hip pelvis les sc 3 cm/>  12/05/2002     1)Arthroscopic partial medial meniscectomy, left knee.  2)Chondroplasty, medial femoral condyle, left knee.  3)Debridement of medial plica, arthroscopic, left knee.     Hc knee scope,med/lat menisectomy  1/26/2005      Arthroscopic partial medial meniscectomy, right knee.     Joint replacemtn, knee rt/lt  11/11/09     Medial unicompartmental  arthroplasty, left knee.     Inject joint sacroiliac Bilateral 6/24/2015     Procedure: INJECT JOINT SACROILIAC;  Surgeon: James Leahy MD;  Location:  OR     Current Outpatient Prescriptions   Medication     cephALEXin (KEFLEX) 500 MG capsule     atorvastatin (LIPITOR) 40 MG tablet     empagliflozin (JARDIANCE) 25 MG TABS tablet     insulin pen needle (B-D U/F) 31G X 8 MM     chlorthalidone (HYGROTON) 25 MG tablet     blood glucose monitoring (ONE TOUCH VERIO IQ) test strip     blood glucose monitoring (ONE TOUCH DELICA) lancets     glipiZIDE (GLUCOTROL XL) 10 MG 24 hr tablet     insulin degludec (TRESIBA) 200 UNIT/ML pen     hydrALAZINE (APRESOLINE) 50 MG tablet     order for DME     allopurinol (ZYLOPRIM) 300 MG tablet     metFORMIN (GLUCOPHAGE-XR) 500 MG 24 hr tablet     oxyCODONE (ROXICODONE) 5 MG immediate release tablet     cyclobenzaprine (FLEXERIL) 10 MG tablet     aspirin 81 MG EC tablet     carvedilol (COREG) 25 MG tablet     ACCU-CHEK COMPACT PLUS test strip     lisinopril (PRINIVIL,ZESTRIL) 40 MG tablet     Acetaminophen (TYLENOL PO)     ORDER FOR DME     Blood Glucose Monitoring Suppl (ACCU-CHEK COMPACT CARE KIT) KIT     ACCU-CHEK MULTICLIX LANCETS MISC     colchicine 0.6 MG tablet     No current facility-administered medications for this visit.        Allergies   Allergen Reactions     No Known Drug Allergies      Social History   Substance Use Topics     Smoking status: Former Smoker -- 0.50 packs/day for 28 years     Types: Cigarettes     Quit date: 01/01/2010     Smokeless tobacco: Former User     Types: Chew     Quit date: 12/31/2009     Alcohol Use: 1.0 oz/week     2 Cans of beer per week     Family History   Problem Relation Age of Onset     Lipids Mother      Hypertension Mother      CANCER Paternal Uncle      x3     HEART DISEASE Father       ROS: 10 point ROS neg other than the symptoms noted above in the HPI.    PE:  B/P: Data Unavailable, T: 96.8, P: 93, R: Data Unavailable  General:  well developed, well nourished WM  who appears his stated age  HEENT: NC/AT, EOMI, (-)icterus, (-)injection, 0.4x0.3x0.4 cm nodular skin lesion right face along crease where bipap mask sits.  Neck: Supple, No JVD  Chest: CTA  Heart: S1, S2, (-)m/r/g  Abd: Soft, non tender, non distended  Ext; Warm, no edema  Psych: AAOx3  Neuro: No focal deficits    Impression/plan:  This is a 51-year-old gentleman with a right facial skin lesion that has been present since he was a teenager.  It appears to be irritated by his BiPAP mask.The patient like it excised.  After discussion with the patient the plan at this time is for excision under local anesthesia.  The procedure, risks, benefits and alternatives were discussed and he agrees to proceed. He'll reschedule the near future.    Bhanu Graham MD, FACS

## 2017-01-23 NOTE — OR NURSING
S: History of diabetes noted during Pre-op phone call  B: Patient scheduled for a Local Procedure Excise Facial Lesion on 1/24/17  A: Patient is diabetic using insulin, taking oral diabetic and other medications  R: Patient instructed to notify primary MD of procedure . MD to give Pt medication instructions: on insulin, oral diabetic medications and other medications prior to scheduled procedure.

## 2017-01-24 ENCOUNTER — HOSPITAL ENCOUNTER (OUTPATIENT)
Facility: CLINIC | Age: 52
Discharge: HOME | End: 2017-01-24
Attending: SPECIALIST | Admitting: SPECIALIST
Payer: COMMERCIAL

## 2017-01-24 VITALS
RESPIRATION RATE: 18 BRPM | WEIGHT: 315 LBS | DIASTOLIC BLOOD PRESSURE: 84 MMHG | SYSTOLIC BLOOD PRESSURE: 150 MMHG | TEMPERATURE: 98.1 F | OXYGEN SATURATION: 95 % | BODY MASS INDEX: 46.06 KG/M2

## 2017-01-24 DIAGNOSIS — G89.18 POST-OP PAIN: Primary | ICD-10-CM

## 2017-01-24 DIAGNOSIS — G47.33 OSA (OBSTRUCTIVE SLEEP APNEA): Primary | Chronic | ICD-10-CM

## 2017-01-24 LAB — GLUCOSE BLDC GLUCOMTR-MCNC: 113 MG/DL (ref 70–99)

## 2017-01-24 PROCEDURE — 36000050 ZZH SURGERY LEVEL 2 1ST 30 MIN: Performed by: SPECIALIST

## 2017-01-24 PROCEDURE — 40000306 ZZH STATISTIC PRE PROC ASSESS II: Performed by: SPECIALIST

## 2017-01-24 PROCEDURE — 88305 TISSUE EXAM BY PATHOLOGIST: CPT | Mod: 26 | Performed by: SPECIALIST

## 2017-01-24 PROCEDURE — 71000027 ZZH RECOVERY PHASE 2 EACH 15 MINS: Performed by: SPECIALIST

## 2017-01-24 PROCEDURE — 82962 GLUCOSE BLOOD TEST: CPT

## 2017-01-24 PROCEDURE — 27210794 ZZH OR GENERAL SUPPLY STERILE: Performed by: SPECIALIST

## 2017-01-24 PROCEDURE — 25000125 ZZHC RX 250: Performed by: SPECIALIST

## 2017-01-24 PROCEDURE — 88305 TISSUE EXAM BY PATHOLOGIST: CPT | Performed by: SPECIALIST

## 2017-01-24 PROCEDURE — 11440 EXC FACE-MM B9+MARG 0.5 CM/<: CPT | Performed by: SPECIALIST

## 2017-01-24 RX ORDER — OXYCODONE AND ACETAMINOPHEN 5; 325 MG/1; MG/1
1-2 TABLET ORAL EVERY 4 HOURS PRN
Qty: 15 TABLET | Refills: 0 | Status: SHIPPED | OUTPATIENT
Start: 2017-01-24 | End: 2017-11-03

## 2017-01-24 RX ORDER — OXYCODONE AND ACETAMINOPHEN 5; 325 MG/1; MG/1
1-2 TABLET ORAL
Status: DISCONTINUED | OUTPATIENT
Start: 2017-01-24 | End: 2017-01-24 | Stop reason: HOSPADM

## 2017-01-24 RX ORDER — LIDOCAINE HYDROCHLORIDE AND EPINEPHRINE 10; 10 MG/ML; UG/ML
INJECTION, SOLUTION INFILTRATION; PERINEURAL PRN
Status: DISCONTINUED | OUTPATIENT
Start: 2017-01-24 | End: 2017-01-24 | Stop reason: HOSPADM

## 2017-01-24 NOTE — BRIEF OP NOTE
[facility] Brief Operative Note    Pre-operative diagnosis: right face skin lesion   Post-operative diagnosis same   Procedure: Procedure(s):  excision right facial lesion - Wound Class: I-Clean - 1.5 cm ellipse   Surgeon: Bhanu Graham MD, FACS   Assistants(s): Lupe Bentley MS3   Estimated blood loss: Minimal    Specimens: Right face skin lesion   Findings: Raised 0.4x0.4x0.3 cm skin lesion     Bhanu Graham MD, FACS    #[id number]

## 2017-01-24 NOTE — IP AVS SNAPSHOT
MRN:[id number]                      After Visit Summary   1/24/2017    Kalia Boateng    MRN: [id number]           Thank you!     Thank you for choosing [facility] for your care. Our goal is always to provide you with excellent care. Hearing back from our patients is one way we can continue to improve our services. Please take a few minutes to complete the written survey that you may receive in the mail after you visit with us. Thank you!        Patient Information     Date Of Birth          1965        About your hospital stay     You were admitted on:  January 24, 2017 You last received care in the:  [facility]    You were discharged on:  January 24, 2017       Who to Call     For medical emergencies, please call 911.  For non-urgent questions about your medical care, please call your primary care provider or clinic, [phone]  For questions related to your surgery, please call your surgery clinic        Attending Provider     Provider    Bhanu Graham MD       Primary Care Provider Office Phone # Fax #    Scottie Florencio Boudreaux [phone]       [address]   [facility] [address]        After Care Instructions     Discharge Instructions       Patient to follow up with appointment in 5-7 days.            Dressing       Keep dressing clean and dry.  Dressing / incisional care as instructed by RN and or Surgeon            Ice to affected area       Ice to operative site PRN            Shower       No shower for 24 hours post procedure. May shower Postoperative Day (POD)  2                  Your next 10 appointments already scheduled     Feb 02, 2017  7:30 AM   Return Visit with Bhanu Graham MD   [facility] ([facility]    [address] [facility] [id number] [phone]            Apr 12, 2017  3:50 PM   Return Visit with Gin Ferreira MD, MG ENDO NURSE   [facility]  [facility] ([facility])    [address] [address] [place] [id number]   [phone]            Jul 25, 2017  4:30 PM   [facility]    [address] [address] [place] [id number]   [phone]              Pending Results     No orders found from 1/23/2017 to 1/25/2017.            Admission Information        Provider Department Dept Phone    1/24/2017 Bhanu Graham MD  Preop/Phase [phone]      Your Vitals Were     Blood Pressure Temperature Respirations Weight Pulse Oximetry       150/84 mmHg 98.1  F (36.7  C) (Oral) 18 145.605 kg (321 lb) 95%       MyChart Information     [facility] gives you secure access to your electronic health record. If you see a primary care provider, you can also send messages to your care team and make appointments. If you have questions, please call your primary care clinic.  If you do not have a primary care provider, please call [phone] and they will assist you.        Care EveryWhere ID     This is your Care EveryWhere ID. This could be used by other organizations to access your [facility] medical records  [id number]           Review of your medicines      START taking        Dose / Directions    oxyCODONE-acetaminophen 5-325 MG per tablet   Commonly known as:  PERCOCET   Used for:  Post-op pain        Dose:  1-2 tablet   Take 1-2 tablets by mouth every 4 hours as needed for pain (moderate to severe)   Quantity:  15 tablet   Refills:  0         CONTINUE these medicines which may have CHANGED, or have new prescriptions. If we are uncertain of the size of tablets/capsules you have at home, strength may be listed as something that might have changed.        Dose / Directions    glipiZIDE 10 MG 24 hr tablet   Commonly known as:  GLUCOTROL XL   This may have changed:  See the new instructions.   Used for:  Type 2 diabetes mellitus with diabetic  neuropathy (H)        TAKE TWO TABLETS BY MOUTH EVERY DAY   Quantity:  180 tablet   Refills:  1       insulin degludec 200 UNIT/ML pen   Commonly known as:  TRESIBA   This may have changed:  how much to take   Used for:  Type 2 diabetes mellitus with diabetic autonomic neuropathy, with long-term current use of insulin (H)        Dose:  25 Units   Inject 25 Units Subcutaneous daily   Quantity:  12 mL   Refills:  3       oxyCODONE 5 MG IR tablet   Commonly known as:  ROXICODONE   This may have changed:    - when to take this  - reasons to take this   Used for:  Acute neck pain, Sprain of neck, subsequent encounter, Disc disorder of cervical region        Dose:  5 mg   Take 1 tablet (5 mg) by mouth At Bedtime   Quantity:  14 tablet   Refills:  0         CONTINUE these medicines which have NOT CHANGED        Dose / Directions    * ACCU-CHEK COMPACT PLUS test strip   Used for:  Type 2 diabetes mellitus without complication (H)   Generic drug:  blood glucose monitoring        USE TO TEST FOUR TIMES A DAY OR AS DIRECTED   Quantity:  102 each   Refills:  12       * blood glucose monitoring test strip   Commonly known as:  ONE TOUCH VERIO IQ   Used for:  Type 2 diabetes, uncontrolled, with neuropathy (H)        Use to test blood sugars 3-4 times daily or as directed.   Quantity:  350 strip   Refills:  3       allopurinol 300 MG tablet   Commonly known as:  ZYLOPRIM   Used for:  Idiopathic chronic gout of multiple sites without tophus        Dose:  1 tablet   Take 1 tablet (300 mg) by mouth daily   Quantity:  90 tablet   Refills:  3       aspirin 81 MG EC tablet   Used for:  Coronary artery disease involving native coronary artery of native heart without angina pectoris        Dose:  81 mg   Take 1 tablet (81 mg) by mouth daily   Quantity:  90 tablet   Refills:  3       atorvastatin 40 MG tablet   Commonly known as:  LIPITOR   Used for:  Type 2 diabetes, uncontrolled, with neuropathy (H)        Dose:  40 mg   Take 1 tablet  (40 mg) by mouth daily   Quantity:  90 tablet   Refills:  3       * blood glucose monitoring lancets   Used for:  Type 2 diabetes, HbA1C goal < 8% (H)        Use to test blood sugar 4 times daily or as directed.   Quantity:  102 each   Refills:  12       * blood glucose monitoring lancets   Used for:  Type 2 diabetes, uncontrolled, with neuropathy (H)        Use to test blood sugars 3-4 times daily or as directed.   Quantity:  1 Box   Refills:  3       blood glucose monitoring meter device kit   Used for:  Type 2 diabetes, HbA1C goal < 8% (H)        Use to test blood sugars 4 times daily or as directed.   Quantity:  1 kit   Refills:  0       carvedilol 25 MG tablet   Commonly known as:  COREG   Used for:  Other secondary hypertension with goal blood pressure less than 140/90        Dose:  12.5 mg   Take 0.5 tablets (12.5 mg) by mouth 2 times daily (with meals)   Quantity:  120 tablet   Refills:  5       cephALEXin 500 MG capsule   Commonly known as:  KEFLEX   Used for:  Lump on face        Dose:  500 mg   Take 1 capsule (500 mg) by mouth 3 times daily   Quantity:  30 capsule   Refills:  0       chlorthalidone 25 MG tablet   Commonly known as:  HYGROTON   Used for:  Essential hypertension with goal blood pressure less than 140/90        Dose:  25 mg   Take 1 tablet (25 mg) by mouth daily   Quantity:  90 tablet   Refills:  3       colchicine 0.6 MG tablet        Take  by mouth as needed.   Refills:  0       cyclobenzaprine 10 MG tablet   Commonly known as:  FLEXERIL   Used for:  Sprain of neck, subsequent encounter, Disc disorder of cervical region, Acute neck pain        Dose:  10 mg   Take 1 tablet (10 mg) by mouth 3 times daily as needed for muscle spasms   Quantity:  60 tablet   Refills:  0       empagliflozin 25 MG Tabs tablet   Commonly known as:  JARDIANCE   Used for:  Type 2 diabetes, uncontrolled, with neuropathy (H)        Dose:  25 mg   Take 1 tablet (25 mg) by mouth daily   Quantity:  90 tablet   Refills:   3       hydrALAZINE 50 MG tablet   Commonly known as:  APRESOLINE   Used for:  Essential hypertension with goal blood pressure less than 140/90        Dose:  50 mg   Take 1 tablet (50 mg) by mouth 3 times daily   Quantity:  270 tablet   Refills:  3       insulin pen needle 31G X 8 MM   Commonly known as:  B-D U/F   Used for:  Type 2 diabetes, uncontrolled, with neuropathy (H)        Use once daily or as directed.   Quantity:  100 each   Refills:  prn       lisinopril 40 MG tablet   Commonly known as:  PRINIVIL/ZESTRIL   Used for:  Hypertension goal BP (blood pressure) < 140/90        TAKE ONE TABLET BY MOUTH ONCE DAILY   Quantity:  90 tablet   Refills:  3       metFORMIN 500 MG 24 hr tablet   Commonly known as:  GLUCOPHAGE-XR   Used for:  Type 2 diabetes mellitus with autonomic neuropathy (H)        TAKE 4 TABLETS BY MOUTH WITH DINNER   Quantity:  360 tablet   Refills:  1       order for DME   Used for:  Type 2 diabetes, HbA1C goal < 8% (H)        Equipment being ordered: CONTROL SOLUTION for checking BS.   Quantity:  1 Bottle   Refills:  3       order for DME   Used for:  CYRIL (obstructive sleep apnea)        Resmed Aircurve 10 auto bilevel 17/11 cm, Mirage Fx Wide nasal mask w/chin strap.   Quantity:  1 Units   Refills:  1       TYLENOL PO   Indication:  Pain        Dose:  1300 mg   Take 1,300 mg by mouth 3 times daily as needed   Refills:  0       * Notice:  This list has 4 medication(s) that are the same as other medications prescribed for you. Read the directions carefully, and ask your doctor or other care provider to review them with you.         Where to get your medicines      Some of these will need a paper prescription and others can be bought over the counter. Ask your nurse if you have questions.     Bring a paper prescription for each of these medications    - oxyCODONE-acetaminophen 5-325 MG per tablet             Protect others around you: Learn how to safely use, store and throw away your medicines  at www.disposemymeds.org.             Medication List: This is a list of all your medications and when to take them. Check marks below indicate your daily home schedule. Keep this list as a reference.      Medications           Morning Afternoon Evening Bedtime As Needed    * ACCU-CHEK COMPACT PLUS test strip   USE TO TEST FOUR TIMES A DAY OR AS DIRECTED   Generic drug:  blood glucose monitoring                                * blood glucose monitoring test strip   Commonly known as:  ONE TOUCH VERIO IQ   Use to test blood sugars 3-4 times daily or as directed.                                allopurinol 300 MG tablet   Commonly known as:  ZYLOPRIM   Take 1 tablet (300 mg) by mouth daily                                aspirin 81 MG EC tablet   Take 1 tablet (81 mg) by mouth daily                                atorvastatin 40 MG tablet   Commonly known as:  LIPITOR   Take 1 tablet (40 mg) by mouth daily                                * blood glucose monitoring lancets   Use to test blood sugar 4 times daily or as directed.                                * blood glucose monitoring lancets   Use to test blood sugars 3-4 times daily or as directed.                                blood glucose monitoring meter device kit   Use to test blood sugars 4 times daily or as directed.                                carvedilol 25 MG tablet   Commonly known as:  COREG   Take 0.5 tablets (12.5 mg) by mouth 2 times daily (with meals)                                cephALEXin 500 MG capsule   Commonly known as:  KEFLEX   Take 1 capsule (500 mg) by mouth 3 times daily                                chlorthalidone 25 MG tablet   Commonly known as:  HYGROTON   Take 1 tablet (25 mg) by mouth daily                                colchicine 0.6 MG tablet   Take  by mouth as needed.                                cyclobenzaprine 10 MG tablet   Commonly known as:  FLEXERIL   Take 1 tablet (10 mg) by mouth 3 times daily as needed for muscle  spasms                                empagliflozin 25 MG Tabs tablet   Commonly known as:  JARDIANCE   Take 1 tablet (25 mg) by mouth daily                                glipiZIDE 10 MG 24 hr tablet   Commonly known as:  GLUCOTROL XL   TAKE TWO TABLETS BY MOUTH EVERY DAY                                hydrALAZINE 50 MG tablet   Commonly known as:  APRESOLINE   Take 1 tablet (50 mg) by mouth 3 times daily                                insulin degludec 200 UNIT/ML pen   Commonly known as:  TRESIBA   Inject 25 Units Subcutaneous daily                                insulin pen needle 31G X 8 MM   Commonly known as:  B-D U/F   Use once daily or as directed.                                lisinopril 40 MG tablet   Commonly known as:  PRINIVIL/ZESTRIL   TAKE ONE TABLET BY MOUTH ONCE DAILY                                metFORMIN 500 MG 24 hr tablet   Commonly known as:  GLUCOPHAGE-XR   TAKE 4 TABLETS BY MOUTH WITH DINNER                                order for DME   Equipment being ordered: CONTROL SOLUTION for checking BS.                                order for DME   Resmed Aircurve 10 auto bilevel 17/11 cm, Mirage Fx Wide nasal mask w/chin strap.                                oxyCODONE 5 MG IR tablet   Commonly known as:  ROXICODONE   Take 1 tablet (5 mg) by mouth At Bedtime                                oxyCODONE-acetaminophen 5-325 MG per tablet   Commonly known as:  PERCOCET   Take 1-2 tablets by mouth every 4 hours as needed for pain (moderate to severe)                                TYLENOL PO   Take 1,300 mg by mouth 3 times daily as needed                                * Notice:  This list has 4 medication(s) that are the same as other medications prescribed for you. Read the directions carefully, and ask your doctor or other care provider to review them with you.

## 2017-01-24 NOTE — OP NOTE
DATE OF SURGERY:  2017.       PREOPERATIVE DIAGNOSIS:  Right face skin lesion.      POSTOPERATIVE DIAGNOSIS:  Same.      PROCEDURE:  Excision of right facial skin lesion with a 1.5 cm ellipse.        SURGEON:  Lynette Graham MD.      ASSISTANT:  Lupe LUEVANO.      ANESTHESIA:  Local.      INDICATIONS FOR PROCEDURE:  Krzysztof Boateng is a 51-year-old gentleman who has had a skin lesion on his right face for the past 40 years; it intermittently draining pus, recently he began wearing a BiPAP which irritated the area and caused some discomfort so he opted to have it excised.      OPERATIVE FINDINGS:  Included a raised 0.4 x 0.4 x 0.3 cm skin lesion.      DETAILS OF PROCEDURE:  The patient was taken to the operating room and left on the stretcher in the supine position.  The area around the skin lesion was prepped and draped in sterile fashion.  A timeout was performed confirming the identity of the patient as well as the procedure performed.   The area was then infiltrated with a local anesthetic, after adequate analgesia was achieved a transverse 1.5 cm elliptical incision was then made, lesion was then removed from the surrounding structures using cautery and submitted as specimen.  We then undermined inferiorly and superiorly to create skin flaps and the defect was then closed using interrupted 5-0 nylon suture.  Sterile dressings were applied and the patient was then taken from the operating room to the recovery room in stable condition to be sent home.         LYNETTE GRAHAM MD             D: 2017 15:04   T: 2017 16:36   MT: [id number]      Name:     KRZYSZTOF BOATENG   MRN:      [id number]        Account:        [id number]   :      1965           Procedure Date: 2017      Document: [id number]

## 2017-01-24 NOTE — IP AVS SNAPSHOT
[facility]    [address]     [place] [address]    Phone:  [phone]                                       After Visit Summary   1/24/2017    Kalia Boateng    MRN: [id number]           After Visit Summary Signature Page     I have received my discharge instructions, and my questions have been answered. I have discussed any challenges I see with this plan with the nurse or doctor.    ..........................................................................................................................................  Patient/Patient Representative Signature      ..........................................................................................................................................  Patient Representative Print Name and Relationship to Patient    ..................................................               ................................................  Date                                            Time    ..........................................................................................................................................  Reviewed by Signature/Title    ...................................................              ..............................................  Date                                                            Time

## 2017-01-25 RX ORDER — DOXAZOSIN 4 MG/1
4 TABLET ORAL AT BEDTIME
Qty: 90 TABLET | Refills: 0 | Status: SHIPPED | OUTPATIENT
Start: 2017-01-25 | End: 2017-04-26

## 2017-01-26 LAB — COPATH REPORT: NORMAL

## 2017-02-02 ENCOUNTER — OFFICE VISIT (OUTPATIENT)
Dept: SURGERY | Facility: OTHER | Age: 52
End: 2017-02-02
Payer: COMMERCIAL

## 2017-02-02 VITALS — WEIGHT: 315 LBS | HEART RATE: 83 BPM | OXYGEN SATURATION: 93 % | BODY MASS INDEX: 46.2 KG/M2 | TEMPERATURE: 98.7 F

## 2017-02-02 DIAGNOSIS — Z98.890 POST-OPERATIVE STATE: Primary | ICD-10-CM

## 2017-02-02 PROCEDURE — 99024 POSTOP FOLLOW-UP VISIT: CPT | Performed by: SPECIALIST

## 2017-02-02 NOTE — MR AVS SNAPSHOT
After Visit Summary   2/2/2017    Kalia Boateng    MRN: [id number]           Patient Information     Date Of Birth          1965        Visit Information        Provider Department      2/2/2017 7:30 AM Bhanu Graham MD [facility]        Today's Diagnoses     Post-operative state    -  1        Follow-ups after your visit        Your next 10 appointments already scheduled     Apr 12, 2017  3:50 PM   Return Visit with Gin Ferreira MD, MG ENDO NURSE   [facility])    [address] [place] [id number] [phone]            Jul 25, 2017  4:30 PM   Return Visit with Malathi Barrera MD   [facility])    [address] [place] [id number] [phone]              Who to contact     If you have questions or need follow up information about today's clinic visit or your schedule please contact [facility] directly at [phone].  Normal or non-critical lab and imaging results will be communicated to you by St[employer], letter or phone within 4 business days after the clinic has received the results. If you do not hear from us within 7 days, please contact the clinic through Stampsyhart or phone. If you have a critical or abnormal lab result, we will notify you by phone as soon as possible.  Submit refill requests through [employer] or call your pharmacy and they will forward the refill request to us. Please allow 3 business days for your refill to be completed.          Additional Information About Your Visit        Stampsyhart Information     [employer] gives you secure access to your electronic health record. If you see a primary care provider, you can also send messages to your care team and make appointments. If you have questions, please call your primary care clinic.  If you do not have a primary care provider, please call [phone] and  they will assist you.        Care EveryWhere ID     This is your Care EveryWhere ID. This could be used by other organizations to access your [place] medical records  [id number]        Your Vitals Were     Pulse Temperature Pulse Oximetry             83 98.7  F (37.1  C) (Temporal) 93%          Blood Pressure from Last 3 Encounters:   01/24/17 150/84   01/16/17 112/60   01/04/17 152/91    Weight from Last 3 Encounters:   02/02/17 322 lb (146.058 kg)   01/24/17 321 lb (145.605 kg)   01/23/17 321 lb (145.605 kg)              Today, you had the following     No orders found for display         Today's Medication Changes          These changes are accurate as of: 2/2/17  7:52 AM.  If you have any questions, ask your nurse or doctor.               These medicines have changed or have updated prescriptions.        Dose/Directions    glipiZIDE 10 MG 24 hr tablet   Commonly known as:  GLUCOTROL XL   This may have changed:  See the new instructions.   Used for:  Type 2 diabetes mellitus with diabetic neuropathy (H)        TAKE TWO TABLETS BY MOUTH EVERY DAY   Quantity:  180 tablet   Refills:  1       insulin degludec 200 UNIT/ML pen   Commonly known as:  TRESIBA   This may have changed:  how much to take   Used for:  Type 2 diabetes mellitus with diabetic autonomic neuropathy, with long-term current use of insulin (H)        Dose:  25 Units   Inject 25 Units Subcutaneous daily   Quantity:  12 mL   Refills:  3       oxyCODONE 5 MG IR tablet   Commonly known as:  ROXICODONE   This may have changed:    - when to take this  - reasons to take this   Used for:  Acute neck pain, Sprain of neck, subsequent encounter, Disc disorder of cervical region        Dose:  5 mg   Take 1 tablet (5 mg) by mouth At Bedtime   Quantity:  14 tablet   Refills:  0                Primary Care Provider Office Phone # Fax #    Scottie Boudreaux [phone]       [facility] [address] [place] [address]         Thank you!     Thank you for choosing [facility]  for your care. Our goal is always to provide you with excellent care. Hearing back from our patients is one way we can continue to improve our services. Please take a few minutes to complete the written survey that you may receive in the mail after your visit with us. Thank you!             Your Updated Medication List - Protect others around you: Learn how to safely use, store and throw away your medicines at www.disposemymeds.org.          This list is accurate as of: 2/2/17  7:52 AM.  Always use your most recent med list.                   Brand Name Dispense Instructions for use    * ACCU-CHEK COMPACT PLUS test strip   Generic drug:  blood glucose monitoring     102 each    USE TO TEST FOUR TIMES A DAY OR AS DIRECTED       * blood glucose monitoring test strip    ONE TOUCH VERIO IQ    350 strip    Use to test blood sugars 3-4 times daily or as directed.       allopurinol 300 MG tablet    ZYLOPRIM    90 tablet    Take 1 tablet (300 mg) by mouth daily       aspirin 81 MG EC tablet     90 tablet    Take 1 tablet (81 mg) by mouth daily       atorvastatin 40 MG tablet    LIPITOR    90 tablet    Take 1 tablet (40 mg) by mouth daily       * blood glucose monitoring lancets     102 each    Use to test blood sugar 4 times daily or as directed.       * blood glucose monitoring lancets     1 Box    Use to test blood sugars 3-4 times daily or as directed.       blood glucose monitoring meter device kit     1 kit    Use to test blood sugars 4 times daily or as directed.       carvedilol 25 MG tablet    COREG    120 tablet    Take 0.5 tablets (12.5 mg) by mouth 2 times daily (with meals)       cephALEXin 500 MG capsule    KEFLEX    30 capsule    Take 1 capsule (500 mg) by mouth 3 times daily       chlorthalidone 25 MG tablet    HYGROTON    90 tablet    Take 1 tablet (25 mg) by mouth daily       colchicine 0.6 MG tablet      Take  by mouth as needed.        cyclobenzaprine 10 MG tablet    FLEXERIL    60 tablet    Take 1 tablet (10 mg) by mouth 3 times daily as needed for muscle spasms       doxazosin 4 MG tablet    CARDURA    90 tablet    Take 1 tablet (4 mg) by mouth At Bedtime       empagliflozin 25 MG Tabs tablet    JARDIANCE    90 tablet    Take 1 tablet (25 mg) by mouth daily       glipiZIDE 10 MG 24 hr tablet    GLUCOTROL XL    180 tablet    TAKE TWO TABLETS BY MOUTH EVERY DAY       hydrALAZINE 50 MG tablet    APRESOLINE    270 tablet    Take 1 tablet (50 mg) by mouth 3 times daily       insulin degludec 200 UNIT/ML pen    TRESIBA    12 mL    Inject 25 Units Subcutaneous daily       insulin pen needle 31G X 8 MM    B-D U/F    100 each    Use once daily or as directed.       lisinopril 40 MG tablet    PRINIVIL/ZESTRIL    90 tablet    TAKE ONE TABLET BY MOUTH ONCE DAILY       metFORMIN 500 MG 24 hr tablet    GLUCOPHAGE-XR    360 tablet    TAKE 4 TABLETS BY MOUTH WITH DINNER       order for DME     1 Bottle    Equipment being ordered: CONTROL SOLUTION for checking BS.       order for DME     1 Units    Resmed Aircurve 10 auto bilevel 17/11 cm, Mirage Fx Wide nasal mask w/chin strap.       oxyCODONE 5 MG IR tablet    ROXICODONE    14 tablet    Take 1 tablet (5 mg) by mouth At Bedtime       oxyCODONE-acetaminophen 5-325 MG per tablet    PERCOCET    15 tablet    Take 1-2 tablets by mouth every 4 hours as needed for pain (moderate to severe)       TYLENOL PO      Take 1,300 mg by mouth 3 times daily as needed       * Notice:  This list has 4 medication(s) that are the same as other medications prescribed for you. Read the directions carefully, and ask your doctor or other care provider to review them with you.

## 2017-02-02 NOTE — NURSING NOTE
"    Chief Complaint   Patient presents with     Surgical Followup     excision right facial lesion       Initial Pulse 83  Temp(Src) 98.7  F (37.1  C) (Temporal)  Wt 322 lb (146.058 kg)  SpO2 93% Estimated body mass index is 46.2 kg/(m^2) as calculated from the following:    Height as of 1/17/17: 5' 10\" (1.778 m).    Weight as of this encounter: 322 lb (146.058 kg)..  BP completed using cuff size: NA (Not Taken)      Ijeoma Griffith CMA  (AAMA)      "

## 2017-03-03 DIAGNOSIS — E11.43 TYPE 2 DIABETES MELLITUS WITH AUTONOMIC NEUROPATHY (H): ICD-10-CM

## 2017-03-03 RX ORDER — METFORMIN HCL 500 MG
TABLET, EXTENDED RELEASE 24 HR ORAL
Qty: 360 TABLET | Refills: 3 | Status: SHIPPED | OUTPATIENT
Start: 2017-03-03 | End: 2018-03-16

## 2017-04-05 DIAGNOSIS — I10 UNCONTROLLED HYPERTENSION: ICD-10-CM

## 2017-04-05 LAB — POTASSIUM SERPL-SCNC: 3.8 MMOL/L (ref 3.4–5.3)

## 2017-04-05 PROCEDURE — 84244 ASSAY OF RENIN: CPT | Mod: 90 | Performed by: INTERNAL MEDICINE

## 2017-04-05 PROCEDURE — 84132 ASSAY OF SERUM POTASSIUM: CPT | Performed by: INTERNAL MEDICINE

## 2017-04-05 PROCEDURE — 82088 ASSAY OF ALDOSTERONE: CPT | Mod: 90 | Performed by: INTERNAL MEDICINE

## 2017-04-05 PROCEDURE — 99000 SPECIMEN HANDLING OFFICE-LAB: CPT | Performed by: INTERNAL MEDICINE

## 2017-04-05 PROCEDURE — 36415 COLL VENOUS BLD VENIPUNCTURE: CPT | Performed by: INTERNAL MEDICINE

## 2017-04-06 LAB
ALDOST SERPL-MCNC: 17.2 NG/DL
RENIN PLAS-CCNC: 0.1 NG/ML/H

## 2017-04-12 ENCOUNTER — OFFICE VISIT (OUTPATIENT)
Dept: ENDOCRINOLOGY | Facility: CLINIC | Age: 52
End: 2017-04-12
Payer: COMMERCIAL

## 2017-04-12 ENCOUNTER — CARE COORDINATION (OUTPATIENT)
Dept: ENDOCRINOLOGY | Facility: CLINIC | Age: 52
End: 2017-04-12

## 2017-04-12 VITALS
WEIGHT: 315 LBS | SYSTOLIC BLOOD PRESSURE: 150 MMHG | HEART RATE: 54 BPM | HEIGHT: 71 IN | BODY MASS INDEX: 44.1 KG/M2 | DIASTOLIC BLOOD PRESSURE: 82 MMHG

## 2017-04-12 DIAGNOSIS — Z79.4 TYPE 2 DIABETES MELLITUS WITH DIABETIC AUTONOMIC NEUROPATHY, WITH LONG-TERM CURRENT USE OF INSULIN (H): Primary | ICD-10-CM

## 2017-04-12 DIAGNOSIS — E26.9 HYPERALDOSTERONISM (H): ICD-10-CM

## 2017-04-12 DIAGNOSIS — E11.43 TYPE 2 DIABETES MELLITUS WITH DIABETIC AUTONOMIC NEUROPATHY, WITH LONG-TERM CURRENT USE OF INSULIN (H): Primary | ICD-10-CM

## 2017-04-12 DIAGNOSIS — I10 ESSENTIAL HYPERTENSION: ICD-10-CM

## 2017-04-12 DIAGNOSIS — I15.9 SECONDARY HYPERTENSION: ICD-10-CM

## 2017-04-12 LAB — HBA1C MFR BLD: 8.5 % (ref 0–5.7)

## 2017-04-12 PROCEDURE — 36415 COLL VENOUS BLD VENIPUNCTURE: CPT | Performed by: INTERNAL MEDICINE

## 2017-04-12 PROCEDURE — 83036 HEMOGLOBIN GLYCOSYLATED A1C: CPT | Performed by: INTERNAL MEDICINE

## 2017-04-12 PROCEDURE — 99215 OFFICE O/P EST HI 40 MIN: CPT | Performed by: INTERNAL MEDICINE

## 2017-04-12 RX ORDER — INSULIN ASPART 100 [IU]/ML
INJECTION, SOLUTION INTRAVENOUS; SUBCUTANEOUS
Qty: 12 ML | Refills: 3 | Status: SHIPPED | OUTPATIENT
Start: 2017-04-12 | End: 2018-04-18

## 2017-04-12 NOTE — PROGRESS NOTES
Pt was seen by Dr Ferreira in clinic today. Per her request, a LibrePro glucose sensor was placed on the patient. The back of his right arm was cleaned with an alcohol pad and the sensor was placed onto this araa. The placement of the sensor went well and without issue. The patient was advised to wear the sensor for 14 days. He was advised there is no need to remove the sensor, including for  Bathing, as is is fully waterproof. The patient states he has jury duty coming up and is not sure on what day he can return the sensor. He plans to call me this Friday with that information.      Total time: 10 minutes  Madison Douglas RN, BSN, CDE   [facility]
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
## 2017-04-12 NOTE — NURSING NOTE
"Kalia Boateng's goals for this visit include: Follow up Diabetes  He requests these members of his care team be copied on today's visit information: YES    PCP: Scottie Boudreaux    Referring Provider:  No referring provider defined for this encounter.    Chief Complaint   Patient presents with     Diabetes       Initial Ht 1.803 m (5' 11\")  Wt (!) 147.9 kg (326 lb 2 oz)  BMI 45.49 kg/m2 Estimated body mass index is 45.49 kg/(m^2) as calculated from the following:    Height as of this encounter: 1.803 m (5' 11\").    Weight as of this encounter: 147.9 kg (326 lb 2 oz).  Medication Reconciliation: complete    Do you need any medication refills at today's visit? YES    "

## 2017-04-12 NOTE — MR AVS SNAPSHOT
After Visit Summary   4/12/2017    Kalia Boateng    MRN: [id number]           Patient Information     Date Of Birth          1965        Visit Information        Provider Department      4/12/2017 3:50 PM Gin Ferreira MD; MG ENDO NURSE [facility]        Today's Diagnoses     Type 2 diabetes mellitus with diabetic autonomic neuropathy, with long-term current use of insulin (H)    -  1    Secondary hypertension          Care Instructions    -1 U per 7 grams carbohydrates prior to dinner. Until you become proficient counting carbs, start by taking 6 U, 10-15 minutes prior to dinner   -Try to check the bedtime BG  -stop Jardiance         Follow-ups after your visit        Your next 10 appointments already scheduled     Jul 25, 2017  4:30 PM CDT   Return Visit with Malathi Barrera MD   [facility])    [address] [place] [id number]   [phone]            Aug 02, 2017  4:00 PM CDT   Return Visit with Gin Ferreira MD   [facility])    [address] [place] [id number]   [phone]            Nov 21, 2017  3:30 PM CST   US THYROID with PHUS1   [facility] ([facility])    [address] [place] [id number] [phone]           Please bring a list of your medicines (including vitamins, minerals and over-the-counter drugs). Also, tell your doctor about any allergies you may have. Wear comfortable clothes and leave your valuables at home.  You do not need to do anything special to prepare for your exam.  Please call the Imaging Department at your exam site with any questions.              Future tests that were ordered for you today     Open Standing Orders        Priority Remaining Interval Expires Ordered    Hemoglobin A1c POCT Routine 3/4 Q 3 Mo 4/12/2018 4/12/2017          Open  "Future Orders        Priority Expected Expires Ordered    CT ADRENAL PROTOCOL W CONTRAST Routine  11/8/2017 4/12/2017            Who to contact     If you have questions or need follow up information about today's clinic visit or your schedule please contact [facility] directly at [phone].  Normal or non-critical lab and imaging results will be communicated to you by MyChart, letter or phone within 4 business days after the clinic has received the results. If you do not hear from us within 7 days, please contact the clinic through [employer] or phone. If you have a critical or abnormal lab result, we will notify you by phone as soon as possible.  Submit refill requests through [employer] or call your pharmacy and they will forward the refill request to us. Please allow 3 business days for your refill to be completed.          Additional Information About Your Visit        [employer] Information     [employer] gives you secure access to your electronic health record. If you see a primary care provider, you can also send messages to your care team and make appointments. If you have questions, please call your primary care clinic.  If you do not have a primary care provider, please call [phone] and they will assist you.      [employer] is an electronic gateway that provides easy, online access to your medical records. With [employer], you can request a clinic appointment, read your test results, renew a prescription or communicate with your care team.     To access your existing account, please contact your [facility] or call [phone] for assistance.        Care EveryWhere ID     This is your Care EveryWhere ID. This could be used by other organizations to access your [facility] medical records  [id number]        Your Vitals Were     Pulse Height BMI (Body Mass Index)             54 1.803 m (5' 11\") 45.49 kg/m2          Blood Pressure from Last 3 Encounters:   04/12/17 " 150/82   01/24/17 150/84   01/16/17 112/60    Weight from Last 3 Encounters:   04/12/17 (!) 147.9 kg (326 lb 2 oz)   02/02/17 (!) 146.1 kg (322 lb)   01/24/17 (!) 145.6 kg (321 lb)              We Performed the Following     Hemoglobin A1c POCT          Today's Medication Changes          These changes are accurate as of: 4/12/17  5:04 PM.  If you have any questions, ask your nurse or doctor.               Start taking these medicines.        Dose/Directions    NovoLOG FLEXPEN 100 UNIT/ML injection   Used for:  Type 2 diabetes mellitus with diabetic autonomic neuropathy, with long-term current use of insulin (H)   Generic drug:  insulin aspart   Started by:  Gin Ferreira MD        Administer 1 U per 7 grams carbohydrates prior to dinner.   Quantity:  12 mL   Refills:  3         These medicines have changed or have updated prescriptions.        Dose/Directions    glipiZIDE 10 MG 24 hr tablet   Commonly known as:  GLUCOTROL XL   This may have changed:  See the new instructions.   Used for:  Type 2 diabetes mellitus with diabetic neuropathy (H)        TAKE TWO TABLETS BY MOUTH EVERY DAY   Quantity:  180 tablet   Refills:  1       insulin degludec 200 UNIT/ML pen   Commonly known as:  TRESIBA   This may have changed:  how much to take   Used for:  Type 2 diabetes mellitus with diabetic autonomic neuropathy, with long-term current use of insulin (H)        Dose:  25 Units   Inject 25 Units Subcutaneous daily   Quantity:  12 mL   Refills:  3         Stop taking these medicines if you haven't already. Please contact your care team if you have questions.     empagliflozin 25 MG Tabs tablet   Commonly known as:  JARDIANCE   Stopped by:  Gin Ferreira MD                Where to get your medicines      These medications were sent to [address]  [address], [facility] [address]     Phone:  [phone]     NovoLOG FLEXPEN 100 UNIT/ML injection                Primary  Care Provider Office Phone # Fax #    Scottie Boudreaux [phone]       [facility] LENORA MARIN [address] [place] [address] DR TAINA MORRELL [phone]        Thank you!     Thank you for choosing [facility]  for your care. Our goal is always to provide you with excellent care. Hearing back from our patients is one way we can continue to improve our services. Please take a few minutes to complete the written survey that you may receive in the mail after your visit with us. Thank you!             Your Updated Medication List - Protect others around you: Learn how to safely use, store and throw away your medicines at www.disposemymeds.org.          This list is accurate as of: 4/12/17  5:04 PM.  Always use your most recent med list.                   Brand Name Dispense Instructions for use    * ACCU-CHEK COMPACT PLUS test strip   Generic drug:  blood glucose monitoring     102 each    USE TO TEST FOUR TIMES A DAY OR AS DIRECTED       * blood glucose monitoring test strip    ONE TOUCH VERIO IQ    350 strip    Use to test blood sugars 3-4 times daily or as directed.       allopurinol 300 MG tablet    ZYLOPRIM    90 tablet    Take 1 tablet (300 mg) by mouth daily       aspirin 81 MG EC tablet     90 tablet    Take 1 tablet (81 mg) by mouth daily       atorvastatin 40 MG tablet    LIPITOR    90 tablet    Take 1 tablet (40 mg) by mouth daily       * blood glucose monitoring lancets     102 each    Use to test blood sugar 4 times daily or as directed.       * blood glucose monitoring lancets     1 Box    Use to test blood sugars 3-4 times daily or as directed.       blood glucose monitoring meter device kit     1 kit    Use to test blood sugars 4 times daily or as directed.       carvedilol 25 MG tablet    COREG    120 tablet    Take 0.5 tablets (12.5 mg) by mouth 2 times daily (with meals)       chlorthalidone 25 MG tablet    HYGROTON    90 tablet    Take 1 tablet (25 mg) by mouth daily        colchicine 0.6 MG tablet      Take  by mouth as needed.       cyclobenzaprine 10 MG tablet    FLEXERIL    60 tablet    Take 1 tablet (10 mg) by mouth 3 times daily as needed for muscle spasms       doxazosin 4 MG tablet    CARDURA    90 tablet    Take 1 tablet (4 mg) by mouth At Bedtime       glipiZIDE 10 MG 24 hr tablet    GLUCOTROL XL    180 tablet    TAKE TWO TABLETS BY MOUTH EVERY DAY       hydrALAZINE 50 MG tablet    APRESOLINE    270 tablet    Take 1 tablet (50 mg) by mouth 3 times daily       insulin degludec 200 UNIT/ML pen    TRESIBA    12 mL    Inject 25 Units Subcutaneous daily       insulin pen needle 31G X 8 MM    B-D U/F    100 each    Use once daily or as directed.       lisinopril 40 MG tablet    PRINIVIL/ZESTRIL    90 tablet    TAKE ONE TABLET BY MOUTH ONCE DAILY       metFORMIN 500 MG 24 hr tablet    GLUCOPHAGE-XR    360 tablet    TAKE 4 TABLETS BY MOUTH WITH DINNER       NovoLOG FLEXPEN 100 UNIT/ML injection   Generic drug:  insulin aspart     12 mL    Administer 1 U per 7 grams carbohydrates prior to dinner.       order for DME     1 Bottle    Equipment being ordered: CONTROL SOLUTION for checking BS.       order for DME     1 Units    Resmed Aircurve 10 auto bilevel 17/11 cm, Mirage Fx Wide nasal mask w/chin strap.       oxyCODONE-acetaminophen 5-325 MG per tablet    PERCOCET    15 tablet    Take 1-2 tablets by mouth every 4 hours as needed for pain (moderate to severe)       TYLENOL PO      Take 1,300 mg by mouth 3 times daily as needed       * Notice:  This list has 4 medication(s) that are the same as other medications prescribed for you. Read the directions carefully, and ask your doctor or other care provider to review them with you.

## 2017-04-12 NOTE — PATIENT INSTRUCTIONS
-1 U per 7 grams carbohydrates prior to dinner. Until you become proficient counting carbs, start by taking 6 U, 10-15 minutes prior to dinner   -Try to check the bedtime BG  -stop Jardiance

## 2017-04-14 ENCOUNTER — TELEPHONE (OUTPATIENT)
Dept: ENDOCRINOLOGY | Facility: CLINIC | Age: 52
End: 2017-04-14

## 2017-04-14 NOTE — TELEPHONE ENCOUNTER
I spoke with the pt. He states his court date is Mon, April 24th. I asked return the LibrePro sensor on Fri, April 21st. No need to see me, Can bring sensor back in a sandwich baggie with his name on it and give to nurse's at . Pt verbalized understanding     Madison Douglas, RN, BSN, CDE   [facility]

## 2017-04-26 DIAGNOSIS — I10 UNCONTROLLED HYPERTENSION: ICD-10-CM

## 2017-04-26 RX ORDER — DOXAZOSIN 4 MG/1
TABLET ORAL
Qty: 90 TABLET | Refills: 0 | Status: SHIPPED | OUTPATIENT
Start: 2017-04-26 | End: 2017-07-26

## 2017-04-26 NOTE — TELEPHONE ENCOUNTER
Received refill request for patient's doxazosin (CARDURA) 4 MG tablet.    Last office visit with Dr. Ferreira was 4/12/17.    Next office visit with Dr. Ferreira on 8/2/17.    Medication not on refill protocol. Will send to Dr. Ferreira to review and complete refill.       Selma Stevenson RN  Endocrine Care Coordinator  [facility]

## 2017-04-27 ENCOUNTER — RADIANT APPOINTMENT (OUTPATIENT)
Dept: CT IMAGING | Facility: CLINIC | Age: 52
End: 2017-04-27
Attending: INTERNAL MEDICINE
Payer: COMMERCIAL

## 2017-04-27 DIAGNOSIS — I15.9 SECONDARY HYPERTENSION: ICD-10-CM

## 2017-04-27 PROCEDURE — 74150 CT ABDOMEN W/O CONTRAST: CPT | Performed by: RADIOLOGY

## 2017-04-28 ENCOUNTER — TELEPHONE (OUTPATIENT)
Dept: ENDOCRINOLOGY | Facility: CLINIC | Age: 52
End: 2017-04-28

## 2017-04-28 RX ORDER — SPIRONOLACTONE 25 MG/1
25 TABLET ORAL 2 TIMES DAILY
Qty: 180 TABLET | Refills: 3 | Status: SHIPPED | OUTPATIENT
Start: 2017-04-28 | End: 2018-04-18

## 2017-04-28 RX ORDER — LISINOPRIL 20 MG/1
20 TABLET ORAL 2 TIMES DAILY
Qty: 180 TABLET | Refills: 3 | Status: SHIPPED | OUTPATIENT
Start: 2017-04-28 | End: 2018-04-18

## 2017-04-28 NOTE — TELEPHONE ENCOUNTER
Per Hanna Message:  Hi, Kalia! The adrenal glands look normal. Since there is no adrenal tumor, I recommend to start the new medication we discussed about at the time of your visit. Here are my recommendations:   Discontinue Hydralazine   Continue:   - 25 mg chlorthalidone daily   - 4 mg cardura daily   - 12.5 mg twice daily Coreg   Restart lisinopril at 20 mg twice daily   Start spironolactone at 25 mg twice daily (new med).     After being on these medications for 1-2 weeks, please schedule labs and also have the BP checked in the clinic. I'll contact you with the results. Please try to maintain a good hydration by drinking plenty of water.     Unable to contact patient by phone. Letter sent via mail and My Chart.    Left message for patient to return call.    Astrid Neri LPN  Adult Endocrinology  [facility]

## 2017-04-28 NOTE — LETTER
May 19, 2017      Kalia Boateng  [address] [address] [place] [id number]              Dear Kalia,    We have been trying to reach you by phone but have been unsuccessful. Please contact us at [phone].    Thank you!        Sincerely,      Gin Ferreira MD  [facility]  Department of Endocrinology  ct

## 2017-04-28 NOTE — PROGRESS NOTES
Called patient and left message. Adrenal glands unremarkable on CT. I sent my recommendations in a result note message:   Discontinue Hydralazine  Continue:   25 mg chlorthalidone daily   4 mg cardura daily   12.5 mg twice daily Coreg  Restart lisinopril at 20 mg twice daily   Start spironolactone at 25 mg twice daily   After being on these medications for 1-2 weeks, please schedule labs and also have the BP checked in the clinic. I'll contact you with the results. Please try to maintain a good hydration by drinking plenty of water.

## 2017-05-02 NOTE — TELEPHONE ENCOUNTER
Left message for patient to return call.    Yessenia Wallis LPN  Adult Endocrinology  [facility]

## 2017-05-05 NOTE — TELEPHONE ENCOUNTER
Second message left for patient to contact clinic.    Yessenia Wallis LPN  Adult Endocrinology  [facility]

## 2017-05-19 NOTE — TELEPHONE ENCOUNTER
Unable to reach patient. Letter mailed to contact clinic.    Yessenia Wallis LPN  Adult Endocrinology  [facility]

## 2017-05-30 ENCOUNTER — TELEPHONE (OUTPATIENT)
Dept: ENDOCRINOLOGY | Facility: CLINIC | Age: 52
End: 2017-05-30

## 2017-05-30 NOTE — TELEPHONE ENCOUNTER
Left pt a vm asking him to call me to discuss setting up an appt to review LibrePro sensor download.     Madison Douglas, RN, BSN, CDE   [facility]

## 2017-06-27 NOTE — TELEPHONE ENCOUNTER
No return call from pt. Has an appt with Dr Ferreira on 08/02. I will show her results of sensor study.     Madison Douglas RN, BSN, CDE   [facility]

## 2017-07-26 DIAGNOSIS — I10 UNCONTROLLED HYPERTENSION: ICD-10-CM

## 2017-07-26 RX ORDER — DOXAZOSIN 4 MG/1
TABLET ORAL
Qty: 90 TABLET | Refills: 2 | Status: SHIPPED | OUTPATIENT
Start: 2017-07-26 | End: 2018-05-31

## 2017-07-27 ENCOUNTER — TELEPHONE (OUTPATIENT)
Dept: FAMILY MEDICINE | Facility: CLINIC | Age: 52
End: 2017-07-27

## 2017-07-27 NOTE — LETTER
[address] [id number] [phone]        July 27, 2017    Kalia Boateng  [address] [address] [place] [phone]          Dear Kalia,     This is a reminder that you are due to see Dr. Boudreaux for a yearly PE.  You are due for a A1C and a Blood pressure check.  Please call [phone] to make an appt.      Sincerely,        Scottie Boudreaux M.D.

## 2017-07-27 NOTE — TELEPHONE ENCOUNTER
Panel Management Review      Patient has the following on his problem list:     Diabetes    ASA: Passed    Last A1C  Lab Results   Component Value Date    A1C 8.5 04/12/2017    A1C 7.4 01/04/2017    A1C 7.8 09/27/2016    A1C 8.3 06/22/2016    A1C 8.2 06/21/2016     A1C tested: FAILED    Last LDL:    Lab Results   Component Value Date    CHOL 150 06/22/2016     Lab Results   Component Value Date    HDL 35 06/22/2016     Lab Results   Component Value Date    LDL 67 06/22/2016     Lab Results   Component Value Date    TRIG 240 06/22/2016     Lab Results   Component Value Date    CHOLHDLRATIO 3.7 02/14/2015     Lab Results   Component Value Date    NHDL 115 06/22/2016       Is the patient on a Statin? YES             Is the patient on Aspirin? YES    Medications     HMG CoA Reductase Inhibitors    atorvastatin (LIPITOR) 40 MG tablet    Salicylates    aspirin 81 MG EC tablet          Last three blood pressure readings:  BP Readings from Last 3 Encounters:   04/12/17 150/82   01/24/17 150/84   01/16/17 112/60       Date of last diabetes office visit: 8/2016     Tobacco History:     History   Smoking Status     Former Smoker     Packs/day: 0.50     Years: 28.00     Types: Cigarettes     Quit date: 1/1/2010   Smokeless Tobacco     Former User     Types: Chew     Quit date: 12/31/2009         Hypertension   Last three blood pressure readings:  BP Readings from Last 3 Encounters:   04/12/17 150/82   01/24/17 150/84   01/16/17 112/60     Blood pressure: FAILED    HTN Guidelines:  Age 18-59 BP range:  Less than 140/90  Age 60-85 with Diabetes:  Less than 140/90  Age 60-85 without Diabetes:  less than 150/90          Composite cancer screening  Chart review shows that this patient is due/due soon for the following None  Summary:    Patient is due/failing the following:   A1C and BP CHECK    Action needed:   Patient needs office visit for diabetes, BP,  Pt also see's endo. .    Type of outreach:    Sent letter.    Questions for  provider review:    None                                                                                                                                         Chart routed to  .

## 2017-08-02 ENCOUNTER — OFFICE VISIT (OUTPATIENT)
Dept: ENDOCRINOLOGY | Facility: CLINIC | Age: 52
End: 2017-08-02
Payer: COMMERCIAL

## 2017-08-02 VITALS
HEIGHT: 71 IN | WEIGHT: 315 LBS | DIASTOLIC BLOOD PRESSURE: 75 MMHG | SYSTOLIC BLOOD PRESSURE: 135 MMHG | BODY MASS INDEX: 44.1 KG/M2 | HEART RATE: 45 BPM

## 2017-08-02 DIAGNOSIS — Z79.4 TYPE 2 DIABETES MELLITUS WITH DIABETIC AUTONOMIC NEUROPATHY, WITH LONG-TERM CURRENT USE OF INSULIN (H): ICD-10-CM

## 2017-08-02 DIAGNOSIS — E04.1 THYROID NODULE: ICD-10-CM

## 2017-08-02 DIAGNOSIS — R00.1 BRADYCARDIA: Primary | ICD-10-CM

## 2017-08-02 DIAGNOSIS — E11.43 TYPE 2 DIABETES MELLITUS WITH DIABETIC AUTONOMIC NEUROPATHY, WITH LONG-TERM CURRENT USE OF INSULIN (H): ICD-10-CM

## 2017-08-02 LAB
ALBUMIN SERPL-MCNC: 4 G/DL (ref 3.4–5)
ALP SERPL-CCNC: 116 U/L (ref 40–150)
ALT SERPL W P-5'-P-CCNC: 59 U/L (ref 0–70)
ANION GAP SERPL CALCULATED.3IONS-SCNC: 7 MMOL/L (ref 3–14)
AST SERPL W P-5'-P-CCNC: 33 U/L (ref 0–45)
BILIRUB SERPL-MCNC: 0.3 MG/DL (ref 0.2–1.3)
BUN SERPL-MCNC: 33 MG/DL (ref 7–30)
CALCIUM SERPL-MCNC: 9.6 MG/DL (ref 8.5–10.1)
CHLORIDE SERPL-SCNC: 104 MMOL/L (ref 94–109)
CK SERPL-CCNC: 127 U/L (ref 30–300)
CO2 SERPL-SCNC: 28 MMOL/L (ref 20–32)
CREAT SERPL-MCNC: 0.94 MG/DL (ref 0.66–1.25)
CREAT UR-MCNC: 123 MG/DL
GFR SERPL CREATININE-BSD FRML MDRD: 83 ML/MIN/1.7M2
GLUCOSE SERPL-MCNC: 160 MG/DL (ref 70–99)
HBA1C MFR BLD: 8.5 % (ref 0–5.7)
HCT VFR BLD AUTO: 41.8 % (ref 40–53)
MICROALBUMIN UR-MCNC: 863 MG/L
MICROALBUMIN/CREAT UR: 701.63 MG/G CR (ref 0–17)
POTASSIUM SERPL-SCNC: 3.9 MMOL/L (ref 3.4–5.3)
PROT SERPL-MCNC: 8.1 G/DL (ref 6.8–8.8)
SODIUM SERPL-SCNC: 139 MMOL/L (ref 133–144)
TSH SERPL DL<=0.005 MIU/L-ACNC: 0.8 MU/L (ref 0.4–4)

## 2017-08-02 PROCEDURE — 95251 CONT GLUC MNTR ANALYSIS I&R: CPT | Performed by: INTERNAL MEDICINE

## 2017-08-02 PROCEDURE — 84443 ASSAY THYROID STIM HORMONE: CPT | Performed by: INTERNAL MEDICINE

## 2017-08-02 PROCEDURE — 80053 COMPREHEN METABOLIC PANEL: CPT | Performed by: INTERNAL MEDICINE

## 2017-08-02 PROCEDURE — 36415 COLL VENOUS BLD VENIPUNCTURE: CPT | Performed by: INTERNAL MEDICINE

## 2017-08-02 PROCEDURE — 83036 HEMOGLOBIN GLYCOSYLATED A1C: CPT | Performed by: INTERNAL MEDICINE

## 2017-08-02 PROCEDURE — 82043 UR ALBUMIN QUANTITATIVE: CPT | Performed by: INTERNAL MEDICINE

## 2017-08-02 PROCEDURE — 85014 HEMATOCRIT: CPT | Performed by: INTERNAL MEDICINE

## 2017-08-02 PROCEDURE — 82550 ASSAY OF CK (CPK): CPT | Performed by: INTERNAL MEDICINE

## 2017-08-02 PROCEDURE — 99214 OFFICE O/P EST MOD 30 MIN: CPT | Performed by: INTERNAL MEDICINE

## 2017-08-02 NOTE — MR AVS SNAPSHOT
After Visit Summary   8/2/2017    Kalia Boateng    MRN: [id number]           Patient Information     Date Of Birth          1965        Visit Information        Provider Department      8/2/2017 4:00 PM Gin Ferreira MD [facility]        Today's Diagnoses     Bradycardia    -  1    Type 2 diabetes mellitus with diabetic autonomic neuropathy, with long-term current use of insulin (H)          Care Instructions    Increase the dose of Tresiba from 28 to 38 U   During workdays, do not take Novolog for breakfast and lunch  When off work, take 1 U Novolog per grams carbohydrates for all three meals             Follow-ups after your visit        Additional Services     CARDIOLOGY EVAL ADULT REFERRAL       Your provider has referred you to:  [facility]: [facility] [phone]   [email or web address]    Please be aware that coverage of these services is subject to the terms and limitations of your health insurance plan.  Call member services at your health plan with any benefit or coverage questions.      Type of Referral:      Timeframe requested:  Within 1 month    Please bring the following to your appointment:  >>   Any x-rays, CTs or MRIs which have been performed.  Contact the facility where they were done to arrange for  prior to your scheduled appointment.    >>   List of current medications  >>   This referral request   >>   Any documents/labs given to you for this referral                  Your next 10 appointments already scheduled     Nov 21, 2017  3:30 PM CST   US THYROID with PHUS1   [facility] ([facility])    [address] [place] [id number] [phone]           Please bring a list of your medicines (including vitamins, minerals and over-the-counter drugs). Also, tell your doctor about any allergies you  may have. Wear comfortable clothes and leave your valuables at home.  You do not need to do anything special to prepare for your exam.  Please call the Imaging Department at your exam site with any questions.            Nov 28, 2017  3:45 PM CST   Return Visit with Gin Ferreira MD, MG ENDO NURSE   [facility]    [address] [address] [place] [id number] [phone]              Who to contact     If you have questions or need follow up information about today's clinic visit or your schedule please contact [facility] directly at [phone].  Normal or non-critical lab and imaging results will be communicated to you by [employer], letter or phone within 4 business days after the clinic has received the results. If you do not hear from us within 7 days, please contact the clinic through [employer] or phone. If you have a critical or abnormal lab result, we will notify you by phone as soon as possible.  Submit refill requests through [employer] or call your pharmacy and they will forward the refill request to us. Please allow 3 business days for your refill to be completed.          Additional Information About Your Visit        [employer] Information     [employer] gives you secure access to your electronic health record. If you see a primary care provider, you can also send messages to your care team and make appointments. If you have questions, please call your primary care clinic.  If you do not have a primary care provider, please call [phone] and they will assist you.      [employer] is an electronic gateway that provides easy, online access to your medical records. With [employer], you can request a clinic appointment, read your test results, renew a prescription or communicate with your care team.     To access your existing account, please contact your [facility] or call [phone] for assistance.       "  Care EveryWhere ID     This is your Care EveryWhere ID. This could be used by other organizations to access your [facility] medical records  [id number]        Your Vitals Were     Pulse Height BMI (Body Mass Index)             45 1.803 m (5' 11\") 44.95 kg/m2          Blood Pressure from Last 3 Encounters:   08/02/17 135/75   04/12/17 150/82   01/24/17 150/84    Weight from Last 3 Encounters:   08/02/17 (!) 146.2 kg (322 lb 5 oz)   04/12/17 (!) 147.9 kg (326 lb 2 oz)   02/02/17 (!) 146.1 kg (322 lb)              We Performed the Following     Albumin Random Urine Quantitative     CARDIOLOGY EVAL ADULT REFERRAL     CK total     Comprehensive metabolic panel     Hematocrit     Hemoglobin A1c POCT     TSH with free T4 reflex          Today's Medication Changes          These changes are accurate as of: 8/2/17  4:58 PM.  If you have any questions, ask your nurse or doctor.               These medicines have changed or have updated prescriptions.        Dose/Directions    glipiZIDE 10 MG 24 hr tablet   Commonly known as:  GLUCOTROL XL   This may have changed:  See the new instructions.   Used for:  Type 2 diabetes mellitus with diabetic neuropathy (H)        TAKE TWO TABLETS BY MOUTH EVERY DAY   Quantity:  180 tablet   Refills:  1                Primary Care Provider Office Phone # Fax #    Scottie Florencio Boudreaux [phone]       [address] [place] [address]        Equal Access to Services     BRANDI RAMÍREZ AH: Hadii aad ku hadasho Solouali, waaxda luqadaha, qaybta kaalmada adeegyada, cuco robins. So [facility] [phone].    ATENCIÓN: Si habla español, tiene a barron disposición servicios gratuitos de asistencia lingüística. Llame al [phone].    We comply with applicable federal civil rights laws and [place] laws. We do not discriminate on the basis of race, color, national origin, age, disability sex, sexual orientation or gender " identity.            Thank you!     Thank you for choosing [facility]  for your care. Our goal is always to provide you with excellent care. Hearing back from our patients is one way we can continue to improve our services. Please take a few minutes to complete the written survey that you may receive in the mail after your visit with us. Thank you!             Your Updated Medication List - Protect others around you: Learn how to safely use, store and throw away your medicines at www.disposemymeds.org.          This list is accurate as of: 8/2/17  4:58 PM.  Always use your most recent med list.                   Brand Name Dispense Instructions for use Diagnosis    * ACCU-CHEK COMPACT PLUS test strip   Generic drug:  blood glucose monitoring     102 each    USE TO TEST FOUR TIMES A DAY OR AS DIRECTED    Type 2 diabetes mellitus without complication (H)       * blood glucose monitoring test strip    ONE TOUCH VERIO IQ    350 strip    Use to test blood sugars 3-4 times daily or as directed.    Type 2 diabetes, uncontrolled, with neuropathy (H)       allopurinol 300 MG tablet    ZYLOPRIM    90 tablet    Take 1 tablet (300 mg) by mouth daily    Idiopathic chronic gout of multiple sites without tophus       aspirin 81 MG EC tablet     90 tablet    Take 1 tablet (81 mg) by mouth daily    Coronary artery disease involving native coronary artery of native heart without angina pectoris       atorvastatin 40 MG tablet    LIPITOR    90 tablet    Take 1 tablet (40 mg) by mouth daily    Type 2 diabetes, uncontrolled, with neuropathy (H)       * blood glucose monitoring lancets     102 each    Use to test blood sugar 4 times daily or as directed.    Type 2 diabetes, HbA1C goal < 8% (H)       * blood glucose monitoring lancets     1 Box    Use to test blood sugars 3-4 times daily or as directed.    Type 2 diabetes, uncontrolled, with neuropathy (H)       blood glucose monitoring meter device kit     1 kit    Use  to test blood sugars 4 times daily or as directed.    Type 2 diabetes, HbA1C goal < 8% (H)       carvedilol 25 MG tablet    COREG    120 tablet    Take 0.5 tablets (12.5 mg) by mouth 2 times daily (with meals)    Other secondary hypertension with goal blood pressure less than 140/90       chlorthalidone 25 MG tablet    HYGROTON    90 tablet    Take 1 tablet (25 mg) by mouth daily    Essential hypertension with goal blood pressure less than 140/90       colchicine 0.6 MG tablet      Take  by mouth as needed.        cyclobenzaprine 10 MG tablet    FLEXERIL    60 tablet    Take 1 tablet (10 mg) by mouth 3 times daily as needed for muscle spasms    Sprain of neck, subsequent encounter, Disc disorder of cervical region, Acute neck pain       doxazosin 4 MG tablet    CARDURA    90 tablet    TAKE ONE TABLET BY MOUTH AT BEDTIME    Uncontrolled hypertension       glipiZIDE 10 MG 24 hr tablet    GLUCOTROL XL    180 tablet    TAKE TWO TABLETS BY MOUTH EVERY DAY    Type 2 diabetes mellitus with diabetic neuropathy (H)       insulin degludec 200 UNIT/ML pen    TRESIBA FLEXTOUCH    30 mL    Take 28 units daily at bedtime.    Type 2 diabetes mellitus with diabetic autonomic neuropathy, with long-term current use of insulin (H)       insulin pen needle 31G X 8 MM    B-D U/F    200 each    Use twice daily or as directed.    Type 2 diabetes, uncontrolled, with neuropathy (H)       lisinopril 20 MG tablet    PRINIVIL/ZESTRIL    180 tablet    Take 1 tablet (20 mg) by mouth 2 times daily    Essential hypertension       metFORMIN 500 MG 24 hr tablet    GLUCOPHAGE-XR    360 tablet    TAKE 4 TABLETS BY MOUTH WITH DINNER    Type 2 diabetes mellitus with autonomic neuropathy (H)       NovoLOG FLEXPEN 100 UNIT/ML injection   Generic drug:  insulin aspart     12 mL    Administer 1 U per 7 grams carbohydrates prior to dinner.    Type 2 diabetes mellitus with diabetic autonomic neuropathy, with long-term current use of insulin (H)       order  for DME     1 Bottle    Equipment being ordered: CONTROL SOLUTION for checking BS.    Type 2 diabetes, HbA1C goal < 8% (H)       order for DME     1 Units    Resmed Aircurve 10 auto bilevel 17/11 cm, Mirage Fx Wide nasal mask w/chin strap.    CYRIL (obstructive sleep apnea)       oxyCODONE-acetaminophen 5-325 MG per tablet    PERCOCET    15 tablet    Take 1-2 tablets by mouth every 4 hours as needed for pain (moderate to severe)    Post-op pain       spironolactone 25 MG tablet    ALDACTONE    180 tablet    Take 1 tablet (25 mg) by mouth 2 times daily    Essential hypertension       TYLENOL PO      Take 1,300 mg by mouth 3 times daily as needed        * Notice:  This list has 4 medication(s) that are the same as other medications prescribed for you. Read the directions carefully, and ask your doctor or other care provider to review them with you.

## 2017-08-02 NOTE — NURSING NOTE
"Kalia Boateng's goals for this visit include: Follow up Diabetes  He requests these members of his care team be copied on today's visit information: YES    PCP: Scottie Boudreaux    Referring Provider:  No referring provider defined for this encounter.    Chief Complaint   Patient presents with     Diabetes       Initial Ht 1.803 m (5' 11\")  Wt (!) 146.2 kg (322 lb 5 oz)  BMI 44.95 kg/m2 Estimated body mass index is 44.95 kg/(m^2) as calculated from the following:    Height as of this encounter: 1.803 m (5' 11\").    Weight as of this encounter: 146.2 kg (322 lb 5 oz).  Medication Reconciliation: complete    Do you need any medication refills at today's visit? YES    "

## 2017-08-02 NOTE — PATIENT INSTRUCTIONS
Increase the dose of Tresiba from 28 to 38 U   During workdays, do not take Novolog for breakfast and lunch  When off work, take 1 U Novolog per grams carbohydrates for all three meals
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
## 2017-08-03 ENCOUNTER — TELEPHONE (OUTPATIENT)
Dept: FAMILY MEDICINE | Facility: CLINIC | Age: 52
End: 2017-08-03

## 2017-08-03 NOTE — TELEPHONE ENCOUNTER
The 2:00 appt got taken. Pt did not answer. If he calls back. We can offer him an appt tomorrow with NICK at 10:30am.  That is all I can offer.  KH

## 2017-08-03 NOTE — TELEPHONE ENCOUNTER
Lm for pt to call clinic back, pt can be put on 2pm appt opening but have him come to clinic at 1:30pm. This is the only thing available for today per Abril Lubin, SONJA

## 2017-08-03 NOTE — TELEPHONE ENCOUNTER
Reason for Call:  Same Day Appointment, Requested Provider:  Scottie Boudreaux M.D.    PCP: Scottie Boudreaux    Reason for visit:  Vertigo    Duration of symptoms: few days - can feel it getting worse    Have you been treated for this in the past? Yes - years ago    Additional comments: pt would like to see Dr Boudreaux    Can we leave a detailed message on this number? YES    Phone number patient can be reached at: Work number on file:  [phone] (work)  Ask for ext [phone]    Best Time: any    Call taken on 8/3/2017 at 11:26 AM by Nan Higgins

## 2017-08-04 ENCOUNTER — MYC MEDICAL ADVICE (OUTPATIENT)
Dept: FAMILY MEDICINE | Facility: CLINIC | Age: 52
End: 2017-08-04

## 2017-08-04 ENCOUNTER — HOSPITAL ENCOUNTER (OUTPATIENT)
Dept: PHYSICAL THERAPY | Facility: CLINIC | Age: 52
Setting detail: THERAPIES SERIES
End: 2017-08-04
Attending: FAMILY MEDICINE
Payer: COMMERCIAL

## 2017-08-04 ENCOUNTER — OFFICE VISIT (OUTPATIENT)
Dept: FAMILY MEDICINE | Facility: CLINIC | Age: 52
End: 2017-08-04
Payer: COMMERCIAL

## 2017-08-04 VITALS
WEIGHT: 315 LBS | SYSTOLIC BLOOD PRESSURE: 140 MMHG | DIASTOLIC BLOOD PRESSURE: 70 MMHG | TEMPERATURE: 98.1 F | BODY MASS INDEX: 45.05 KG/M2 | HEART RATE: 55 BPM | OXYGEN SATURATION: 97 %

## 2017-08-04 DIAGNOSIS — E66.01 MORBID OBESITY, UNSPECIFIED OBESITY TYPE (H): Chronic | ICD-10-CM

## 2017-08-04 DIAGNOSIS — Z79.4 TYPE 2 DIABETES MELLITUS WITH DIABETIC AUTONOMIC NEUROPATHY, WITH LONG-TERM CURRENT USE OF INSULIN (H): ICD-10-CM

## 2017-08-04 DIAGNOSIS — H81.10 BPV (BENIGN POSITIONAL VERTIGO), UNSPECIFIED LATERALITY: Primary | ICD-10-CM

## 2017-08-04 DIAGNOSIS — E11.43 TYPE 2 DIABETES MELLITUS WITH DIABETIC AUTONOMIC NEUROPATHY, WITH LONG-TERM CURRENT USE OF INSULIN (H): ICD-10-CM

## 2017-08-04 PROCEDURE — 97110 THERAPEUTIC EXERCISES: CPT | Mod: GP

## 2017-08-04 PROCEDURE — 97162 PT EVAL MOD COMPLEX 30 MIN: CPT | Mod: GP

## 2017-08-04 PROCEDURE — 40000840 ZZHC STATISTIC PT VESTIBULAR VISIT

## 2017-08-04 PROCEDURE — 99214 OFFICE O/P EST MOD 30 MIN: CPT | Performed by: FAMILY MEDICINE

## 2017-08-04 PROCEDURE — 97140 MANUAL THERAPY 1/> REGIONS: CPT | Mod: GP

## 2017-08-04 RX ORDER — MECLIZINE HYDROCHLORIDE 25 MG/1
25 TABLET ORAL EVERY 6 HOURS PRN
Qty: 30 TABLET | Refills: 1 | Status: SHIPPED | OUTPATIENT
Start: 2017-08-04 | End: 2017-11-03

## 2017-08-04 NOTE — NURSING NOTE
"Chief Complaint   Patient presents with     Dizziness     Neck Pain       Initial /70  Pulse 55  Temp 98.1  F (36.7  C) (Temporal)  Wt (!) 323 lb (146.5 kg)  SpO2 97%  BMI 45.05 kg/m2 Estimated body mass index is 45.05 kg/(m^2) as calculated from the following:    Height as of 8/2/17: 5' 11\" (1.803 m).    Weight as of this encounter: 323 lb (146.5 kg).  Medication Reconciliation: complete   Mel Lubin CMA    "

## 2017-08-04 NOTE — PROGRESS NOTES
SUBJECTIVE:                                                    Kalia Boateng is a 52 year old male who presents to clinic today for the following health issues:      Dizziness  Onset: 6 days    Description:   Do you feel faint:  No, pressure in head  Does it feel like the surroundings (bed, room) are moving: no   Unsteady/off balance: YES  Have you passed out or fallen: no     Intensity: moderate, severe    Progression of Symptoms:  worsening    Accompanying Signs & Symptoms:  Heart palpitations: no   Nausea, vomiting: YES- nausea  Weakness in arms or legs: no   Fatigue: YES  Vision or speech changes: YES- vision changes when dizziness started  Ringing in ears (Tinnitus): no   Hearing Loss: no     History:   Head trauma/concussion hx: no   Previous similar symptoms: YES  Recent bleeding history: no     Precipitating factors:   Worse with activity or head movement: YES  Any new medications (BP?): no   Alcohol/drug abuse/withdrawal: no     Alleviating factors:   Does staying in a fixed position give relief:  YES    Therapies Tried and outcome: none-has noticed heart rate to be slower down to 48      Problem list and histories reviewed & adjusted, as indicated.  Additional history: as documented    Reviewed and updated as needed this visit by clinical staffTobacco  Allergies  Meds  Problems  Med Hx  Surg Hx  Fam Hx  Soc Hx        Reviewed and updated as needed this visit by Provider  Allergies  Meds  Problems       Symptoms started a week ago and worsened yesterday.    History of this in the past.  Last episode was many years ago and was worse.  Today he can at least walk.  Improved with rehab therapy.      Blood sugars are in the 200 range over the past few days when he has been checking them.      Blood pressure the past few days has been 135-145 systolic.      About 2 years ago he had a drop in his pulse while he was on carvedilol.  Had dose dropped by about 1/2 the dose when this happened.  Pulse got  low over the past week now with these new symptoms.      Medications reviewed.  Social History   Substance Use Topics     Smoking status: Former Smoker     Packs/day: 0.50     Years: 28.00     Types: Cigarettes     Quit date: 1/1/2010     Smokeless tobacco: Former User     Types: Chew     Quit date: 12/31/2009     Alcohol use 1.0 oz/week     2 Cans of beer per week        ROS:  10 point ROS of systems including Constitutional, Eyes, Respiratory, Cardiovascular, Gastroenterology, Genitourinary, Integumentary, Muscularskeletal, Psychiatric were all negative except for pertinent positives noted in my HPI.     OBJECTIVE:                                                    /70  Pulse 55  Temp 98.1  F (36.7  C) (Temporal)  Wt (!) 323 lb (146.5 kg)  SpO2 97%  BMI 45.05 kg/m2  Body mass index is 45.05 kg/(m^2).  GENERAL APPEARANCE: alert, mild distress and sitting in chair during discussion not looking well.  EYES: Eyes grossly normal to inspection, PERRL, conjunctivae and sclerae normal and nystagmus with eye movements   HENT: ear canals and TM's normal and nose and mouth without ulcers or lesions  NECK: no adenopathy, no asymmetry, masses, or scars and thyroid normal to palpation  RESP: lungs clear to auscultation - no rales, rhonchi or wheezes  CV: regular rates and rhythm, normal S1 S2, no S3 or S4 and no murmur, click or rub  NEURO: movements of head cause him to note nausea and instability.  He is able to stand without issues regarding balance.  Can walk okay provided that he can refrain from moving head.  Positive Concord-Hallpike maneuver with both sides.            ASSESSMENT/PLAN:                                                        ICD-10-CM    1. BPV (benign positional vertigo), unspecified laterality H81.10 meclizine (ANTIVERT) 25 MG tablet     PHYSICAL THERAPY REFERRAL   2. Morbid obesity, unspecified obesity type (H) E66.01    3. Type 2 diabetes mellitus with diabetic autonomic neuropathy, with  long-term current use of insulin (H) E11.43     Z79.4      PLAN:  1.  Patient has symptoms that appear consistent with benign positional vertigo.  I do not believe this is related to his carvedilol use as he has no orthostatic symptoms.  Diabetes also does not appear to be the cause.  Will have him get in with rehab therapy for evaluation and vestibular rehab.  Also gave prescription for meclizine to help with symptoms.    Follow up with Provider - only if symptoms do not improve after a few weeks, sooner if they worsen or if other symptoms occur.       Scottie Boudreaux MD   [facility]

## 2017-08-04 NOTE — MR AVS SNAPSHOT
"              After Visit Summary   8/4/2017    Kalia Boateng    MRN: [id number]           Patient Information     Date Of Birth          1965        Visit Information        Provider Department      8/4/2017 10:30 AM Scottie Boudreaux MD [facility]        Today's Diagnoses     BPV (benign positional vertigo), unspecified laterality    -  1       Follow-ups after your visit        Additional Services     PHYSICAL THERAPY REFERRAL       *This therapy referral will be filtered to a centralized scheduling office at [facility] and the patient will receive a call to schedule an appointment at a [facility] location most convenient for them. *     [facility] provides Physical Therapy evaluation and treatment and many specialty services across the [facility] system.  If requesting a specialty program, please choose from the list below.    If you have not heard from the scheduling office within 2 business days, please call [phone] for all locations, with the exception of [place], please call [phone].  Treatment: Evaluation & Treatment  Special Instructions/Modalities:   Special Programs: Balance/Vestibular    Please be aware that coverage of these services is subject to the terms and limitations of your health insurance plan.  Call member services at your health plan with any benefit or coverage questions.      **Note to Provider:  If you are referring outside of [facility] for the therapy appointment, please list the name of the location in the \"special instructions\" above, print the referral and give to the patient to schedule the appointment.                  Your next 10 appointments already scheduled     Sep 29, 2017  4:00 PM CDT   New Visit with Michael Schroeder MD   [facility]    [address] [place] [id number]   [phone]            Oct 17, 2017  4:30 PM CDT   Return Visit " with Malathi Barrera MD   [facility]    [address] [place] [address] [phone]            Nov 21, 2017  3:30 PM CST   US THYROID with PHUS1   [facility] Ultrasound ([facility])    [address] [facility] [address] [phone]           Please bring a list of your medicines (including vitamins, minerals and over-the-counter drugs). Also, tell your doctor about any allergies you may have. Wear comfortable clothes and leave your valuables at home.  You do not need to do anything special to prepare for your exam.  Please call the Imaging Department at your exam site with any questions.            Nov 28, 2017  3:45 PM CST   Return Visit with Gin Ferreira MD, MG ENDO NURSE   [facility]    [address] [place] [address] [phone]              Who to contact     If you have questions or need follow up information about today's clinic visit or your schedule please contact [facility] directly at [phone].  Normal or non-critical lab and imaging results will be communicated to you by MyChart, letter or phone within 4 business days after the clinic has received the results. If you do not hear from us within 7 days, please contact the clinic through [employer] or phone. If you have a critical or abnormal lab result, we will notify you by phone as soon as possible.  Submit refill requests through [employer] or call your pharmacy and they will forward the refill request to us. Please allow 3 business days for your refill to be completed.          Additional Information About Your Visit        [employer] Information     [employer] gives you secure access to your electronic health record. If you see a primary care provider, you can also send messages to your care team and make appointments. If you have questions, please call  your primary care clinic.  If you do not have a primary care provider, please call [phone] and they will assist you.        Care EveryWhere ID     This is your Care EveryWhere ID. This could be used by other organizations to access your [facility] medical records  [id number]        Your Vitals Were     Pulse Temperature Pulse Oximetry BMI (Body Mass Index)          55 98.1  F (36.7  C) (Temporal) 97% 45.05 kg/m2         Blood Pressure from Last 3 Encounters:   08/04/17 140/70   08/02/17 135/75   04/12/17 150/82    Weight from Last 3 Encounters:   08/04/17 (!) 323 lb (146.5 kg)   08/02/17 (!) 322 lb 5 oz (146.2 kg)   04/12/17 (!) 326 lb 2 oz (147.9 kg)              We Performed the Following     PHYSICAL THERAPY REFERRAL          Today's Medication Changes          These changes are accurate as of: 8/4/17 12:02 PM.  If you have any questions, ask your nurse or doctor.               Start taking these medicines.        Dose/Directions    meclizine 25 MG tablet   Commonly known as:  ANTIVERT   Used for:  BPV (benign positional vertigo), unspecified laterality   Started by:  Scotite Boudreaux MD        Dose:  25 mg   Take 1 tablet (25 mg) by mouth every 6 hours as needed for dizziness   Quantity:  30 tablet   Refills:  1         These medicines have changed or have updated prescriptions.        Dose/Directions    glipiZIDE 10 MG 24 hr tablet   Commonly known as:  GLUCOTROL XL   This may have changed:  See the new instructions.   Used for:  Type 2 diabetes mellitus with diabetic neuropathy (H)        TAKE TWO TABLETS BY MOUTH EVERY DAY   Quantity:  180 tablet   Refills:  1            Where to get your medicines      These medications were sent to [facility] - [place], [place] - [address]  [address], [facility] [address]     Phone:  [phone]     meclizine 25 MG tablet                Primary Care Provider Office Phone # Fax #    Scottie Boudreaux [phone]       [facility]  [place] [address] [place] [address]        Equal Access to Services     BRANDI RAMÍREZ : Hadii rosalino ku hadjesúso Solouali, waaxda luqadaha, qaybta kaalmada mariannaniltonsuresh, waxay idiin hayolenajason juniorchristianadi robins. So [facility] [phone].    ATENCIÓN: Si habla español, tiene a barron disposición servicios gratuitos de asistencia lingüística. [facility] [phone].    We comply with applicable federal civil rights laws and [place] laws. We do not discriminate on the basis of race, color, national origin, age, disability sex, sexual orientation or gender identity.            Thank you!     Thank you for choosing [facility]  for your care. Our goal is always to provide you with excellent care. Hearing back from our patients is one way we can continue to improve our services. Please take a few minutes to complete the written survey that you may receive in the mail after your visit with us. Thank you!             Your Updated Medication List - Protect others around you: Learn how to safely use, store and throw away your medicines at www.disposemymeds.org.          This list is accurate as of: 8/4/17 12:02 PM.  Always use your most recent med list.                   Brand Name Dispense Instructions for use Diagnosis    * ACCU-CHEK COMPACT PLUS test strip   Generic drug:  blood glucose monitoring     102 each    USE TO TEST FOUR TIMES A DAY OR AS DIRECTED    Type 2 diabetes mellitus without complication (H)       * blood glucose monitoring test strip    ONE TOUCH VERIO IQ    350 strip    Use to test blood sugars 3-4 times daily or as directed.    Type 2 diabetes, uncontrolled, with neuropathy (H)       allopurinol 300 MG tablet    ZYLOPRIM    90 tablet    Take 1 tablet (300 mg) by mouth daily    Idiopathic chronic gout of multiple sites without tophus       aspirin 81 MG EC tablet     90 tablet    Take 1 tablet (81 mg) by mouth daily    Coronary artery disease involving native coronary artery of native heart  without angina pectoris       atorvastatin 40 MG tablet    LIPITOR    90 tablet    Take 1 tablet (40 mg) by mouth daily    Type 2 diabetes, uncontrolled, with neuropathy (H)       * blood glucose monitoring lancets     102 each    Use to test blood sugar 4 times daily or as directed.    Type 2 diabetes, HbA1C goal < 8% (H)       * blood glucose monitoring lancets     1 Box    Use to test blood sugars 3-4 times daily or as directed.    Type 2 diabetes, uncontrolled, with neuropathy (H)       blood glucose monitoring meter device kit     1 kit    Use to test blood sugars 4 times daily or as directed.    Type 2 diabetes, HbA1C goal < 8% (H)       carvedilol 25 MG tablet    COREG    120 tablet    Take 0.5 tablets (12.5 mg) by mouth 2 times daily (with meals)    Other secondary hypertension with goal blood pressure less than 140/90       chlorthalidone 25 MG tablet    HYGROTON    90 tablet    Take 1 tablet (25 mg) by mouth daily    Essential hypertension with goal blood pressure less than 140/90       colchicine 0.6 MG tablet      Take  by mouth as needed.        cyclobenzaprine 10 MG tablet    FLEXERIL    60 tablet    Take 1 tablet (10 mg) by mouth 3 times daily as needed for muscle spasms    Sprain of neck, subsequent encounter, Disc disorder of cervical region, Acute neck pain       doxazosin 4 MG tablet    CARDURA    90 tablet    TAKE ONE TABLET BY MOUTH AT BEDTIME    Uncontrolled hypertension       glipiZIDE 10 MG 24 hr tablet    GLUCOTROL XL    180 tablet    TAKE TWO TABLETS BY MOUTH EVERY DAY    Type 2 diabetes mellitus with diabetic neuropathy (H)       insulin degludec 200 UNIT/ML pen    TRESIBA FLEXTOUCH    30 mL    Take 28 units daily at bedtime.    Type 2 diabetes mellitus with diabetic autonomic neuropathy, with long-term current use of insulin (H)       insulin pen needle 31G X 8 MM    B-D U/F    200 each    Use twice daily or as directed.    Type 2 diabetes, uncontrolled, with neuropathy (H)        lisinopril 20 MG tablet    PRINIVIL/ZESTRIL    180 tablet    Take 1 tablet (20 mg) by mouth 2 times daily    Essential hypertension       meclizine 25 MG tablet    ANTIVERT    30 tablet    Take 1 tablet (25 mg) by mouth every 6 hours as needed for dizziness    BPV (benign positional vertigo), unspecified laterality       metFORMIN 500 MG 24 hr tablet    GLUCOPHAGE-XR    360 tablet    TAKE 4 TABLETS BY MOUTH WITH DINNER    Type 2 diabetes mellitus with autonomic neuropathy (H)       NovoLOG FLEXPEN 100 UNIT/ML injection   Generic drug:  insulin aspart     12 mL    Administer 1 U per 7 grams carbohydrates prior to dinner.    Type 2 diabetes mellitus with diabetic autonomic neuropathy, with long-term current use of insulin (H)       order for DME     1 Bottle    Equipment being ordered: CONTROL SOLUTION for checking BS.    Type 2 diabetes, HbA1C goal < 8% (H)       order for DME     1 Units    Resmed Aircurve 10 auto bilevel 17/11 cm, Mirage Fx Wide nasal mask w/chin strap.    CYRIL (obstructive sleep apnea)       oxyCODONE-acetaminophen 5-325 MG per tablet    PERCOCET    15 tablet    Take 1-2 tablets by mouth every 4 hours as needed for pain (moderate to severe)    Post-op pain       spironolactone 25 MG tablet    ALDACTONE    180 tablet    Take 1 tablet (25 mg) by mouth 2 times daily    Essential hypertension       TYLENOL PO      Take 1,300 mg by mouth 3 times daily as needed        * Notice:  This list has 4 medication(s) that are the same as other medications prescribed for you. Read the directions carefully, and ask your doctor or other care provider to review them with you.

## 2017-08-09 DIAGNOSIS — E11.40 TYPE 2 DIABETES MELLITUS WITH DIABETIC NEUROPATHY (H): ICD-10-CM

## 2017-08-09 RX ORDER — GLIPIZIDE 10 MG/1
TABLET, FILM COATED, EXTENDED RELEASE ORAL
Qty: 180 TABLET | Refills: 3 | Status: SHIPPED | OUTPATIENT
Start: 2017-08-09 | End: 2018-08-29

## 2017-08-18 ENCOUNTER — HOSPITAL ENCOUNTER (OUTPATIENT)
Dept: PHYSICAL THERAPY | Facility: CLINIC | Age: 52
Setting detail: THERAPIES SERIES
End: 2017-08-18
Attending: FAMILY MEDICINE
Payer: COMMERCIAL

## 2017-08-18 PROCEDURE — 40000840 ZZHC STATISTIC PT VESTIBULAR VISIT

## 2017-08-18 PROCEDURE — 97140 MANUAL THERAPY 1/> REGIONS: CPT | Mod: GP

## 2017-08-18 NOTE — PROGRESS NOTES
08/04/17 1511   Quick Adds   Quick Adds Vestibular Eval   Type of Visit Initial OP PT Evaluation   General Information   Start of Care Date 08/04/17   Referring Physician Dr. 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   Prior level of function comment indep with all movements. No falls he states just very uncomfortable   Previous/Current Treatment Physical Therapy   Improvement after PT Significant   Living environment House/townhome   Home/Community Accessibility Comments driving he is holding back from because of the dizziness   Patient/Family Goals Statement get rid of the dizzyness and pain in neck and shoulders   Fall Risk Screen   Fall screen completed by PT   Per patient - Fall 2 or more times in past year? No   Per patient - Fall with injury in past year? No   System Outcome Measures   Outcome Measures BPPV   Dizziness Handicap Inventory (score out of 100) A decrease in score by 17.18 or greater indicates a clinically significant change in symptoms. 52   Pain   Patient currently in pain Yes   Pain rating 6/10    Pain description Throbbing;Pressure   Cognitive Status Examination   Orientation orientation to person, place and time   Level of Consciousness alert   Follows Commands and Answers Questions 100% of the time   Personal Safety and Judgment intact   Memory intact   Posture   Posture Forward head position;Protracted shoulders   Palpation   Palpation cervical spine tight and reactive   Range of Motion (ROM)   ROM Comment Cervical ROM was extension 50 %, rotation 50 % right and left.  UE and LE WFL pt  is obese so has some limitiation in normal ROM   Strength   Manual Muscle Testing Quick Adds No deficits were identified   Bed Mobility   Bed Mobility Comments indep   Transfer Skills   Transfer Comments indep   Locomotion   Wheel Chair Mobility Comments indep   Gait   Gait Comments indep   Balance   Balance no deficits were identified   Sensory Examination   Sensory Perception no deficits were identified   Coordination   Coordination no deficits were identified   Muscle Tone   Muscle Tone no deficits were identified   Cervicogenic Screen   Neck ROM Cervical ROM was extension 50 %, rotation 50 % right and left.    Vertebral Artery Test Comments provocation test normal   Distraction Normal   Infrared Goggle Exam or Frenzel Lense Exam   Vestibular Suppressant in Last 24 Hours? 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 Dizziness and neck pain   Clinical Presentation Stable/Uncomplicated   Clinical Decision Making (Complexity) Low complexity   Therapy Frequency 1 time/week   Predicted Duration of Therapy Intervention (days/wks) 60   Risk & Benefits of therapy have been explained Yes   Patient, Family & other staff in agreement with plan of care Yes   Education Assessment   Preferred Learning Style Listening;Demonstration;Pictures/video   Barriers to Learning No barriers   GOALS   PT Eval Goals 1;2   Goal 1   Goal Identifier 1   Goal Description Pt will  have DHI 0 to indicate resolution of symptoms of dizziness.   Target Date 10/02/17   Goal 2   Goal Identifier 2   Goal Description Pt will report having normal Cervical ROM for drivng the forklift and 75 to 100% reduction in pain.   Target Date 10/02/17   Total Evaluation Time   Total Evaluation Time (Minutes) 30

## 2017-08-18 NOTE — PROGRESS NOTES
Outpatient Physical Therapy Discharge Note     Patient: Kalia Boateng  : 1965    Beginning/End Dates of Reporting Period:  2017 to 2017    Referring Provider: Dr. Scottie Boudreaux    Therapy Diagnosis: BPPV     Client Self Report: Pt reports that he has not pain . He stated that exercise really works referring to the madiha stretch on the towel roll. He states that he has been working on posture . I feel good and I have not had any dizziness since that first Rx.         Outcome Measures (most recent score):  Dizziness Handicap Inventory (score out of 100) A decrease in score by 17.18 or greater indicates a clinically significant change in symptoms.: 0       Goals:  Goal Identifier 1   Goal Description Pt will have DHI 0 to indicate resolution of symptoms of dizziness.   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   Target Date 10/02/17   Date Met      Progress: goal met     Plan:  Discharge from therapy.    Discharge:    Reason for Discharge: Patient has met all goals.    Equipment Issued: none    Discharge Plan: Patient to continue home program.

## 2017-08-18 NOTE — ADDENDUM NOTE
Encounter addended by: Inna Ndiaye, PT on: 8/18/2017  5:12 PM<BR>     Actions taken: Sign clinical note, Flowsheet data copied forward, Flowsheet accepted

## 2017-09-19 ENCOUNTER — TELEPHONE (OUTPATIENT)
Dept: FAMILY MEDICINE | Facility: CLINIC | Age: 52
End: 2017-09-19

## 2017-09-19 DIAGNOSIS — I10 ESSENTIAL HYPERTENSION WITH GOAL BLOOD PRESSURE LESS THAN 140/90: Primary | ICD-10-CM

## 2017-09-19 NOTE — TELEPHONE ENCOUNTER
Coreg      Last Written Prescription Date: 7/26/2016  Last Fill Quantity: 120, # refills: 5    Last Office Visit with G, [facility] or [facility] prescribing provider:  8/04/2017    Future Office Visit:    Next 5 appointments (look out 90 days)     Oct 17, 2017  4:30 PM CDT   [facility]    [address] [place] [id number]   [phone]            Nov 28, 2017  3:45 PM CST   Return Visit with Gin Ferreira MD, MG ENDO NURSE   [facility]    [address] [place] [address]   [phone]                    BP Readings from Last 3 Encounters:   08/04/17 140/70   08/02/17 135/75   04/12/17 150/82

## 2017-09-21 RX ORDER — CARVEDILOL 25 MG/1
12.5 TABLET ORAL 2 TIMES DAILY WITH MEALS
Qty: 30 TABLET | Refills: 0 | Status: SHIPPED | OUTPATIENT
Start: 2017-09-21 | End: 2017-09-29

## 2017-09-21 NOTE — TELEPHONE ENCOUNTER
Rx refilled per RN protocol.  1 month    Will forward to schedulers to schedule patient for yearly OV.  Yee Lopez RN

## 2017-09-26 ENCOUNTER — TELEPHONE (OUTPATIENT)
Dept: PULMONOLOGY | Facility: CLINIC | Age: 52
End: 2017-09-26

## 2017-09-26 NOTE — TELEPHONE ENCOUNTER
Left message for patient to call back and reschedule Dr Barrera appointment  Unfortunately, Dr Barrera has to take an unexpected Leave of Absence. And will be returning December 2017 /January 2018.      Thanks  Christiana FOSTER CMA  [facility]  Pulmonology  Phone [phone]  Fax [phone]

## 2017-09-29 ENCOUNTER — OFFICE VISIT (OUTPATIENT)
Dept: CARDIOLOGY | Facility: CLINIC | Age: 52
End: 2017-09-29
Payer: COMMERCIAL

## 2017-09-29 VITALS
BODY MASS INDEX: 45.69 KG/M2 | WEIGHT: 315 LBS | DIASTOLIC BLOOD PRESSURE: 78 MMHG | SYSTOLIC BLOOD PRESSURE: 138 MMHG | HEART RATE: 56 BPM | OXYGEN SATURATION: 97 %

## 2017-09-29 DIAGNOSIS — R00.1 SINUS BRADYCARDIA: Primary | ICD-10-CM

## 2017-09-29 PROCEDURE — 99244 OFF/OP CNSLTJ NEW/EST MOD 40: CPT | Mod: GC | Performed by: INTERNAL MEDICINE

## 2017-09-29 ASSESSMENT — PAIN SCALES - GENERAL: PAINLEVEL: NO PAIN (0)

## 2017-09-29 NOTE — NURSING NOTE
"Kalia Boateng's goals for this visit include:   Chief Complaint   Patient presents with     Consult     Bradycardia       He requests these members of his care team be copied on today's visit information: PCP    PCP: Scottie Boudreaux    Referring Provider:  Gin Ferreira MD  [address] [place] [address]    Chief Complaint   Patient presents with     Consult     Bradycardia       Initial /78 (BP Location: Left arm, Patient Position: Chair, Cuff Size: Adult Large)  Pulse 56  Wt (!) 148.6 kg (327 lb 9.6 oz)  SpO2 97%  BMI 45.69 kg/m2 Estimated body mass index is 45.69 kg/(m^2) as calculated from the following:    Height as of 8/2/17: 1.803 m (5' 11\").    Weight as of this encounter: 148.6 kg (327 lb 9.6 oz).  Medication Reconciliation: complete       Medication Refills: Rossi Heath CMA        "

## 2017-09-29 NOTE — PROGRESS NOTES
CARDIOLOGY CLINIC VISIT    Name: Kalia Boateng MRN: [id number]     Age: 52 year old   YOB: 1965     Reason for referral: bradycardia         Assessment and Plan:   52 year-old male with PMH sig for DM2, HTN, CYRIL, osteoarthritis, and non-obstructive CAD with borderline EF (50-55%) who is referred for evaluation of bradycardia.    #Sinus bradycardia, asymptomatic - heart rates recorded as low as 45 bpm while formerly on carvedilol - since being off for a week, HR now back to baseline close to 60 bpm and asymptomatic; he has a h/o non-obstructive CAD, but no h/o MI; EF is low- normal. As such, will have him discontinue his carvedilol    #Non-obstructive CAD - cont primary prevention: statin, ASA, diabetes control; encouraged him to continue an active lifestyle and his efforts at weight loss    #HTN: controlled on meds    Follow-up with PCP; available for any questions or follow-up as needed    The patient was seen and discussed with Dr. Schroeder.    Stacia Barr MD  Cardiology Fellow  [phone]    [facility]    I have seen and examined the patient, reviewed labs and tests. I have discussed my findings and treatment recommendations with the house staff and/or Cardiology fellow and agree with their assessment and plan as outlined in the note.    Michael Schroeder MD    Cardiac Imaging and Prevention  [facility]  Pager: [phone]         HPI:   Kalia Boateng is a 52 year-old male with PMH sig for DM2, HTN, CYRIL, osteoarthritis, and non-obstructive CAD with borderline EF (50-55%) who is referred for evaluation of bradycardia. His heart rate in endocrine clinic in August this year was 45 bpm on carvedilol 12.5 mg BID. The patient had been reporting brief episodes of lightheadedness. In the past week, the patient's prescription for carvedilol ran out, so he hasn't been on it, and he reports feeling well with no concerns. Heart rate in  clinic today is 57 bpm, and he states his baseline is around 60 bpm. Denies chest discomfort, palpitations or dyspnea.  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 No limitations to his activity tolerance.         Past Medical History:     Past Medical History:   Diagnosis Date     AC joint arthropathy 11/6/2015     Closed fracture of shaft of left humerus 7/27/2015     Diagnosis updated by automated process. Provider to review and confirm.     Closed fracture of shaft of left humerus with routine healing 10/9/2015     Diagnosis updated by automated process. Provider to review and confirm.     Diabetic eye exam (H) 09/07/11     Dysfunction of left rotator cuff 10/9/2015     Essential hypertension      Gout      NONSPECIFIC MEDICAL HISTORY 1980    Jaw fracture     CYRIL (obstructive sleep apnea) 2011     Osteoarthrosis, unspecified whether generalized or localized, lower leg     left knee     Other and unspecified hyperlipidemia      Proteinuria      Type II or unspecified type diabetes mellitus without mention of complication, not stated as uncontrolled              Past Surgical History:      Past Surgical History:   Procedure Laterality Date     EXCISE LESION FACE Right 1/24/2017    Procedure: EXCISE LESION FACE;  Surgeon: Bhanu Graham MD;  Location:  OR      ARTHROTOMY W/OPEN MENISCUS REPAIR  12/05/2002    1)Arthroscopic partial medial meniscectomy, left knee.  2)Chondroplasty, medial femoral condyle, left knee.  3)Debridement of medial plica, arthroscopic, left knee.      KNEE SCOPE, DIAGNOSTIC      Arthroscopy, Knee      KNEE SCOPE,MED/LAT MENISECTOMY  1/26/2005     Arthroscopic partial medial meniscectomy, right knee.     HC REPAIR ROTATOR CUFF,ACUTE  1990's    Rt Rotator Cuff Repair     HC VASECTOMY UNILAT/BILAT W POSTOP SEMEN  1991    Vasectomy     INJECT JOINT SACROILIAC Bilateral 6/24/2015    Procedure: INJECT JOINT SACROILIAC;  Surgeon: James Leahy,  MD;  Location: [facility] OR     JOINT REPLACEMTN, KNEE RT/LT  11/11/09    Medial unicompartmental arthroplasty, left knee.             Social History:     Social History   Substance Use Topics     Smoking status: Former Smoker     Packs/day: 0.50     Years: 28.00     Types: Cigarettes     Quit date: 1/1/2010     Smokeless tobacco: Former User     Types: Chew     Quit date: 12/31/2009     Alcohol use 1.0 oz/week     2 Cans of beer per week    - Currently not drinking alcohol  -Works for [employer]  -; 2 children with first wife         Family History:     Family History   Problem Relation Age of Onset     Lipids Mother      Hypertension Mother      CANCER Paternal Uncle      x3     HEART DISEASE Father     Father - CAD s/p MI; PCI (in his 60-70's)         Allergies:     Allergies   Allergen Reactions     No Known Drug Allergies              Medications:     Current Outpatient Prescriptions   Medication     glipiZIDE (GLUCOTROL XL) 10 MG 24 hr tablet     meclizine (ANTIVERT) 25 MG tablet     doxazosin (CARDURA) 4 MG tablet     insulin degludec (TRESIBA FLEXTOUCH) 200 UNIT/ML pen     spironolactone (ALDACTONE) 25 MG tablet     lisinopril (PRINIVIL/ZESTRIL) 20 MG tablet     NOVOLOG FLEXPEN 100 UNIT/ML soln     metFORMIN (GLUCOPHAGE-XR) 500 MG 24 hr tablet     oxyCODONE-acetaminophen (PERCOCET) 5-325 MG per tablet     atorvastatin (LIPITOR) 40 MG tablet     chlorthalidone (HYGROTON) 25 MG tablet     allopurinol (ZYLOPRIM) 300 MG tablet     cyclobenzaprine (FLEXERIL) 10 MG tablet     aspirin 81 MG EC tablet     Acetaminophen (TYLENOL PO)     colchicine 0.6 MG tablet     Carvedilol (COREG PO)     insulin pen needle (B-D U/F) 31G X 8 MM     blood glucose monitoring (ONE TOUCH VERIO IQ) test strip     blood glucose monitoring (ONE TOUCH DELICA) lancets     order for DME     ORDER FOR DME     Blood Glucose Monitoring Suppl (ACCU-CHEK COMPACT CARE KIT) KIT     No current facility-administered medications for this  visit.             Review of Systems:   GEN: Denies fevers, shaking chills, night sweats, decreased appetite, weight loss/gain  EYES: Denies blurry or double vision  EARS/NOSE/THROAT: Denies sore throat, dysphagia  RESP: Denies exertional shortness of breath, productive cough, hemoptysis  CARDIO: Denies chest pain, palpitations, lower extremity edema  ABD: Denies nausea, vomiting, abdominal pain, constipation, diarrhea, melena  : Denies dysuria or hematuria  MSK:+ knee pain, h/o gout  NEURO: Denies headache, numbness/tingling, weakness  HEME: Denies bruising, bleeding petechiae   ENDO: Denies heat/cold intolerance, polyuria, polydipsia         Exam:   /78 (BP Location: Left arm, Patient Position: Chair, Cuff Size: Adult Large)  Pulse 56  Wt (!) 148.6 kg (327 lb 9.6 oz)  SpO2 97%  BMI 45.69 kg/m2  GEN: A & O, sitting comfortably  HEENT: PERRL, no scleral icterus, mucus membranes moist  NECK: Supple, JVP not elevated   RESP: CTA bilaterally, no wheezing, no crackles, no increased work of breathing   CV: Regular, normal rate, distant S1/S2 without appreciable murmur/rub/gallop  ABD: Soft, obese, nontender to palpation, +BS, no guarding  EXT: wearing LE wraps  NEURO: CN II-XII grossly intact  SKIN: Normal skin turgor, no rash on limited exam         Data:   Labs reviewed         Imaging:   EKG:  Mild sinus bradycardia 57 bpm; no ST-T changes; normal intervals    Echocardiogram: 11/2010  The study was technically adequate.  There is no comparison study available.  The left ventricle is normal in size. There is moderate concentric left   ventricular hypertrophy. The visual ejection fraction is estimated at 50-55%.   There is borderline global hypokinesia of the left ventricle. There is mild   biatrial enlargement. A trivial left to right shunt is noted by color doppler   and confirmed by pulsed doppler.     Angiogram 12/2010  DISCUSSION:  Mr. Boateng has moderate narrowing in the diagonal branch   of the LAD  and the very terminal portion of the circumflex vessel.    The patient's lesions do not appear to be focal or high grade in the   distal circumflex and it would appear that medical therapy would be   the best option for him.    Nuclear Stress Test 8/2016  Impression  1.  Myocardial perfusion imaging using single isotope technique  demonstrated some fixed count depression involving the inferior and  apical walls probably due to soft tissue artifact. There is no  convincing evidence for myocardial ischemia in the study..   2. Gated images demonstrated moderate left ventricular chamber  enlargement with normal wall motion.  The left ventricular systolic  function is normal, with the ejection fraction measured at 51%.  3. Compared to the prior study from 11/4/2010, I no longer see apical  hypokinesis. There is no change in the perfusion .

## 2017-10-02 NOTE — TELEPHONE ENCOUNTER
Left message for patient to call back   We have openings on Dr Barrera's schedule tomorrow  10/3/2017

## 2017-10-03 DIAGNOSIS — M1A.09X0 IDIOPATHIC CHRONIC GOUT OF MULTIPLE SITES WITHOUT TOPHUS: ICD-10-CM

## 2017-10-03 NOTE — TELEPHONE ENCOUNTER
allopurinol (ZYLOPRIM) 300 MG tablet       Last Written Prescription Date: 9/5/16  Last Fill Quantity: 90, # refills: 3  Last Office Visit with G, P or [facility] prescribing provider:  8/4/17   Next 5 appointments (look out 90 days)     Oct 17, 2017  4:30 PM CDT   Return Visit with Malathi Barrera MD   [facility])    [address] [facility] [address]   [phone]            Nov 28, 2017  3:45 PM CST   Return Visit with Gin Ferreira MD, MG ENDO NURSE   [facility])    [address] [facility] [address] [phone]                   Uric Acid   Date Value Ref Range Status   03/24/2016 5.2 3.5 - 7.2 mg/dL Final   ]  Creatinine   Date Value Ref Range Status   08/02/2017 0.94 0.66 - 1.25 mg/dL Final   ]  Lab Results   Component Value Date    WBC 6.2 03/24/2016     Lab Results   Component Value Date    RBC 5.45 03/24/2016     Lab Results   Component Value Date    HGB 16.0 08/12/2016     Lab Results   Component Value Date    HCT 41.8 08/02/2017     No components found for: MCT  Lab Results   Component Value Date    MCV 90 03/24/2016     Lab Results   Component Value Date    MCH 29.4 03/24/2016     Lab Results   Component Value Date    MCHC 32.7 03/24/2016     Lab Results   Component Value Date    RDW 15.5 03/24/2016     Lab Results   Component Value Date     06/22/2016     Lab Results   Component Value Date    AST 33 08/02/2017     Lab Results   Component Value Date    ALT 59 08/02/2017

## 2017-10-06 RX ORDER — ALLOPURINOL 300 MG/1
1 TABLET ORAL DAILY
Qty: 30 TABLET | Refills: 0 | Status: SHIPPED | OUTPATIENT
Start: 2017-10-06 | End: 2017-11-03

## 2017-10-06 NOTE — TELEPHONE ENCOUNTER
Routing refill request to provider for review/approval because:  Labs not current:  Uric Acid, CBC, AST/ALT  Patient needs to be seen because:  >1 year since LOV for gout  T'd up 1 month for provider review.    Will forward to schedulers to schedule patient for OV.  Yee oLpez RN

## 2017-10-06 NOTE — TELEPHONE ENCOUNTER
Left message for patient to call back and speak with any .  Thank you,  Sophie Olivo  Patient Representative

## 2017-11-03 ENCOUNTER — OFFICE VISIT (OUTPATIENT)
Dept: FAMILY MEDICINE | Facility: CLINIC | Age: 52
End: 2017-11-03
Payer: COMMERCIAL

## 2017-11-03 VITALS
OXYGEN SATURATION: 97 % | HEART RATE: 100 BPM | TEMPERATURE: 97.5 F | SYSTOLIC BLOOD PRESSURE: 138 MMHG | WEIGHT: 315 LBS | DIASTOLIC BLOOD PRESSURE: 82 MMHG | BODY MASS INDEX: 45.97 KG/M2

## 2017-11-03 DIAGNOSIS — Z23 NEED FOR PROPHYLACTIC VACCINATION AND INOCULATION AGAINST INFLUENZA: ICD-10-CM

## 2017-11-03 DIAGNOSIS — Z11.59 NEED FOR HEPATITIS C SCREENING TEST: ICD-10-CM

## 2017-11-03 DIAGNOSIS — G47.33 OSA (OBSTRUCTIVE SLEEP APNEA): Chronic | ICD-10-CM

## 2017-11-03 DIAGNOSIS — M19.91 PRIMARY OSTEOARTHRITIS, UNSPECIFIED SITE: ICD-10-CM

## 2017-11-03 DIAGNOSIS — M50.90 DISC DISORDER OF CERVICAL REGION: ICD-10-CM

## 2017-11-03 DIAGNOSIS — M1A.09X0 IDIOPATHIC CHRONIC GOUT OF MULTIPLE SITES WITHOUT TOPHUS: Primary | ICD-10-CM

## 2017-11-03 DIAGNOSIS — E66.01 MORBID OBESITY, UNSPECIFIED OBESITY TYPE (H): Chronic | ICD-10-CM

## 2017-11-03 DIAGNOSIS — M79.641 PAIN OF RIGHT HAND: ICD-10-CM

## 2017-11-03 DIAGNOSIS — R29.898 WEAKNESS OF LEFT HAND: ICD-10-CM

## 2017-11-03 DIAGNOSIS — D69.6 THROMBOCYTOPENIA (H): ICD-10-CM

## 2017-11-03 DIAGNOSIS — M79.642 PAIN OF LEFT HAND: ICD-10-CM

## 2017-11-03 DIAGNOSIS — E78.5 HYPERLIPIDEMIA LDL GOAL <100: ICD-10-CM

## 2017-11-03 PROCEDURE — 90686 IIV4 VACC NO PRSV 0.5 ML IM: CPT | Performed by: FAMILY MEDICINE

## 2017-11-03 PROCEDURE — 99214 OFFICE O/P EST MOD 30 MIN: CPT | Mod: 25 | Performed by: FAMILY MEDICINE

## 2017-11-03 PROCEDURE — 90471 IMMUNIZATION ADMIN: CPT | Performed by: FAMILY MEDICINE

## 2017-11-03 RX ORDER — ALLOPURINOL 300 MG/1
1 TABLET ORAL DAILY
Qty: 90 TABLET | Refills: 3 | Status: SHIPPED | OUTPATIENT
Start: 2017-11-03 | End: 2018-11-06

## 2017-11-03 RX ORDER — OXYCODONE AND ACETAMINOPHEN 5; 325 MG/1; MG/1
1-2 TABLET ORAL EVERY 4 HOURS PRN
Qty: 24 TABLET | Refills: 0 | Status: SHIPPED | OUTPATIENT
Start: 2017-11-03 | End: 2019-05-22

## 2017-11-03 ASSESSMENT — PAIN SCALES - GENERAL: PAINLEVEL: SEVERE PAIN (7)

## 2017-11-03 NOTE — PROGRESS NOTES
SUBJECTIVE:   Kalia Boateng is a 52 year old male who presents to clinic today for the following health issues:      Concern - Medication refills - Allopurinol and Pain medication  Onset: Ongoig    Description:   Gout medication        Problem list and histories reviewed & adjusted, as indicated.  Additional history: as documented    Reviewed and updated as needed this visit by clinical staffTobacco  Allergies  Meds  Problems  Med Hx  Surg Hx  Fam Hx  Soc Hx        Reviewed and updated as needed this visit by Provider  Allergies  Meds  Problems         Patient here for follow-up on his gout.  This is stable.  He is taking allopurinol.  No issues while on medication.      Patient having issues with left hand weakness and pain.  Having difficulty hanging on to things.  Loss of grasp strength.  No numbness/tingling.  No known injury.  He works a lot with his hands/forearms.      Has neck pain that occasionally requires oxycodone for pain management.  Last prescription was 10 months ago for 15 tablets.  He uses about 2 tabs/month.      History of thrombocytopenia.  Unclear from what cause.  Last CBC was over a year ago.    History of obstructive sleep apnea.  This is managed by sleep clinic.    ROS:  10 point ROS of systems including Constitutional, Eyes, Respiratory, Cardiovascular, Gastroenterology, Genitourinary, Integumentary, Muscularskeletal, Psychiatric were all negative except for pertinent positives noted in my HPI.     OBJECTIVE:                                                    /82 (BP Location: Right arm, Patient Position: Chair, Cuff Size: Adult Large)  Pulse 100  Temp 97.5  F (36.4  C) (Temporal)  Wt (!) 329 lb 9.6 oz (149.5 kg)  SpO2 97%  BMI 45.97 kg/m2  Body mass index is 45.97 kg/(m^2).  GENERAL APPEARANCE: alert, no distress and morbidly obese  NECK: no adenopathy, no asymmetry, masses, or scars and normal active range of motion  RESP: lungs clear to auscultation - no rales,  rhonchi or wheezes  CV: regular rates and rhythm, normal S1 S2, no S3 or S4 and no murmur, click or rub  MS: loss of grasp strength in left hand versus right.  Some pain with grasp and range of motion of wrist.            ASSESSMENT/PLAN:                                                        ICD-10-CM    1. Idiopathic chronic gout of multiple sites without tophus [id number].[id number] Uric acid     allopurinol (ZYLOPRIM) 300 MG tablet   2. Hyperlipidemia LDL goal <100 E78.5 Lipid panel reflex to direct LDL Fasting   3. Pain of left hand M79.642 OCCUPATIONAL THERAPY REFERRAL   4. Weakness of left hand R29.898 OCCUPATIONAL THERAPY REFERRAL   5. Pain of right hand M79.641    6. Post-op pain G89.18 oxyCODONE-acetaminophen (PERCOCET) 5-325 MG per tablet   7. Morbid obesity, unspecified obesity type (H) E66.01    8. CYRIL (obstructive sleep apnea) Severe G47.33    9. Thrombocytopenia (H) D69.6 CBC with platelets   10. Need for hepatitis C screening test Z11.59 Hepatitis C Screen Reflex to HCV RNA Quant and Genotype   11. Need for prophylactic vaccination and inoculation against influenza Z23 FLU VAC, SPLIT VIRUS IM > 3 YO (QUADRIVALENT) [88866]     Vaccine Administration, Initial [03320]     PLAN:  1.  Referral to occupational therapy for his left hand weakness and pain.  2.  Recheck CBC for thrombocytopenia.    3.  Refilled small amount of oxycodone for as needed neck pain.  This prescription should last about 1 year.  4.   Medications refilled for all other above noted stable conditions.  Labs ordered as noted above.      Follow up with Provider - as needed for left hand if occupational therapy not helpful.  Otherwise can follow-up in 1 year.  Diabetes is managed by endocrine.       Scottie Boudreaux MD   [facility]

## 2017-11-03 NOTE — MR AVS SNAPSHOT
After Visit Summary   11/3/2017    Kalia Boateng    MRN: [id number]           Patient Information     Date Of Birth          1965        Visit Information        Provider Department      11/3/2017 2:15 PM Scottie Boudreaux MD [facility]        Today's Diagnoses     Idiopathic chronic gout of multiple sites without tophus    -  1    Hyperlipidemia LDL goal <100        Pain of left hand        Weakness of left hand        Pain of right hand        Morbid obesity, unspecified obesity type (H)        CYRIL (obstructive sleep apnea) Severe        Thrombocytopenia (H)        Need for hepatitis C screening test        Need for prophylactic vaccination and inoculation against influenza        Disc disorder of cervical region        Primary osteoarthritis, unspecified site           Follow-ups after your visit        Additional Services     OCCUPATIONAL THERAPY REFERRAL       *This therapy referral will be filtered to a centralized scheduling office at [facility] and the patient will receive a call to schedule an appointment at a [facility] location most convenient for them. *     [facility] provides Occupational Therapy evaluation and treatment and many specialty services across the [facility] system.  If requesting a specialty program, please choose from the list below.    If you have not heard from the scheduling office within 2 business days, please call [phone] for all locations, with the exception of [place], please call [phone].     Treatment: Evaluation & Treatment  Special Instructions/Modalities: hand therapy.  Left hand is weak and painful.  Right hand has some pain.  If requested by patient and/or therapist we could include right hand for referral.    Special Programs: Hand Therapy ([facility], [facility] & [place] locations only)    Please be aware that coverage of these services is subject to the terms and limitations of your health  "insurance plan.  Call member services at your health plan with any benefit or coverage questions.      **Note to Provider:  If you are referring outside of [place] for the therapy appointment, please list the name of the location in the \"special instructions\" above, print the referral and give to the patient to schedule the appointment.                  Your next 10 appointments already scheduled     Nov 21, 2017  3:30 PM CST   US THYROID with PHUS1   [facility] Ultrasound ([facility])    [address] [id number] [phone]           Please bring a list of your medicines (including vitamins, minerals and over-the-counter drugs). Also, tell your doctor about any allergies you may have. Wear comfortable clothes and leave your valuables at home.  You do not need to do anything special to prepare for your exam.  Please call the Imaging Department at your exam site with any questions.            Nov 28, 2017  3:45 PM CST   Return Visit with Gin Ferreira MD, MG ENDO NURSE   [facility])    [address] [place] [address] [phone]            Feb 06, 2018  4:00 PM CST   Return Visit with Malathi Barrera MD   [facility])    [address] [place] [address] [phone]              Who to contact     If you have questions or need follow up information about today's clinic visit or your schedule please contact [facility] directly at [phone].  Normal or non-critical lab and imaging results will be communicated to you by MyChart, letter or phone within 4 business days after the clinic has received the results. If you do not hear from us within 7 days, please contact the clinic through MyChart or phone. If you have a critical or abnormal lab result, we will notify you by phone as soon as possible.  Submit " refill requests through [facility] or call your pharmacy and they will forward the refill request to us. Please allow 3 business days for your refill to be completed.          Additional Information About Your Visit        [facility] Information     [facility] gives you secure access to your electronic health record. If you see a primary care provider, you can also send messages to your care team and make appointments. If you have questions, please call your primary care clinic.  If you do not have a primary care provider, please call [phone] and they will assist you.        Care EveryWhere ID     This is your Care EveryWhere ID. This could be used by other organizations to access your [facility] medical records  [id number]        Your Vitals Were     Pulse Temperature Pulse Oximetry BMI (Body Mass Index)          100 97.5  F (36.4  C) (Temporal) 97% 45.97 kg/m2         Blood Pressure from Last 3 Encounters:   11/03/17 138/82   09/29/17 138/78   08/04/17 140/70    Weight from Last 3 Encounters:   11/03/17 (!) 329 lb 9.6 oz (149.5 kg)   09/29/17 (!) 327 lb 9.6 oz (148.6 kg)   08/04/17 (!) 323 lb (146.5 kg)              We Performed the Following     FLU VAC, SPLIT VIRUS IM > 3 YO (QUADRIVALENT) [75464]     OCCUPATIONAL THERAPY REFERRAL     Vaccine Administration, Initial [61153]          Today's Medication Changes          These changes are accurate as of: 11/3/17 11:59 PM.  If you have any questions, ask your nurse or doctor.               These medicines have changed or have updated prescriptions.        Dose/Directions    allopurinol 300 MG tablet   Commonly known as:  ZYLOPRIM   This may have changed:  additional instructions   Used for:  Idiopathic chronic gout of multiple sites without tophus   Changed by:  Scottie Boudreuax MD        Dose:  1 tablet   Take 1 tablet (300 mg) by mouth daily   Quantity:  90 tablet   Refills:  3         Stop taking these medicines if you haven't already. Please contact your care  team if you have questions.     meclizine 25 MG tablet   Commonly known as:  ANTIVERT   Stopped by:  Scottie Boudreaux MD                Where to get your medicines      These medications were sent to [facility] - [place] - [address]  [address], [place] [address]     Phone:  [phone]     allopurinol 300 MG tablet         Some of these will need a paper prescription and others can be bought over the counter.  Ask your nurse if you have questions.     Bring a paper prescription for each of these medications     oxyCODONE-acetaminophen 5-325 MG per tablet                Primary Care Provider Office Phone # Fax #    Scottie Boudreaux [phone]       [address] [place] [address]        Equal Access to Services     BRANDI RAMÍREZ AH: Hadii rosalino guevara hadasho Soomaali, waaxda luqadaha, qaybta kaalmada adeegyada, waxay dara santillan . So [facility] [phone].    ATENCIÓN: Si habla español, tiene a barron disposición servicios gratuitos de asistencia lingüística. [facility] [phone].    We comply with applicable federal civil rights laws and [place] laws. We do not discriminate on the basis of race, color, national origin, age, disability, sex, sexual orientation, or gender identity.            Thank you!     Thank you for choosing [facility]  for your care. Our goal is always to provide you with excellent care. Hearing back from our patients is one way we can continue to improve our services. Please take a few minutes to complete the written survey that you may receive in the mail after your visit with us. Thank you!             Your Updated Medication List - Protect others around you: Learn how to safely use, store and throw away your medicines at www.disposemymeds.org.          This list is accurate as of: 11/3/17 11:59 PM.  Always use your most recent med list.                   Brand Name Dispense Instructions for use Diagnosis     allopurinol 300 MG tablet    ZYLOPRIM    90 tablet    Take 1 tablet (300 mg) by mouth daily    Idiopathic chronic gout of multiple sites without tophus       aspirin 81 MG EC tablet     90 tablet    Take 1 tablet (81 mg) by mouth daily    Coronary artery disease involving native coronary artery of native heart without angina pectoris       atorvastatin 40 MG tablet    LIPITOR    90 tablet    Take 1 tablet (40 mg) by mouth daily    Type 2 diabetes, uncontrolled, with neuropathy (H)       blood glucose monitoring lancets     1 Box    Use to test blood sugars 3-4 times daily or as directed.    Type 2 diabetes, uncontrolled, with neuropathy (H)       blood glucose monitoring meter device kit     1 kit    Use to test blood sugars 4 times daily or as directed.    Type 2 diabetes, HbA1C goal < 8% (H)       blood glucose monitoring test strip    ONETOUCH VERIO IQ    350 strip    Use to test blood sugars 3-4 times daily or as directed.    Type 2 diabetes, uncontrolled, with neuropathy (H)       chlorthalidone 25 MG tablet    HYGROTON    90 tablet    Take 1 tablet (25 mg) by mouth daily    Essential hypertension with goal blood pressure less than 140/90       colchicine 0.6 MG tablet      Take  by mouth as needed.        doxazosin 4 MG tablet    CARDURA    90 tablet    TAKE ONE TABLET BY MOUTH AT BEDTIME    Uncontrolled hypertension       glipiZIDE 10 MG 24 hr tablet    GLUCOTROL XL    180 tablet    TAKE 2 TABLETS BY MOUTH EVERY DAY.    Type 2 diabetes mellitus with diabetic neuropathy (H)       insulin degludec 200 UNIT/ML pen    TRESIBA FLEXTOUCH    30 mL    Take 28 units daily at bedtime.    Type 2 diabetes mellitus with diabetic autonomic neuropathy, with long-term current use of insulin (H)       insulin pen needle 31G X 8 MM    B-D U/F    200 each    Use twice daily or as directed.    Type 2 diabetes, uncontrolled, with neuropathy (H)       lisinopril 20 MG tablet    PRINIVIL/ZESTRIL    180 tablet    Take 1 tablet (20  mg) by mouth 2 times daily    Essential hypertension       metFORMIN 500 MG 24 hr tablet    GLUCOPHAGE-XR    360 tablet    TAKE 4 TABLETS BY MOUTH WITH DINNER    Type 2 diabetes mellitus with autonomic neuropathy (H)       NovoLOG FLEXPEN 100 UNIT/ML injection   Generic drug:  insulin aspart     12 mL    Administer 1 U per 7 grams carbohydrates prior to dinner.    Type 2 diabetes mellitus with diabetic autonomic neuropathy, with long-term current use of insulin (H)       order for DME     1 Bottle    Equipment being ordered: CONTROL SOLUTION for checking BS.    Type 2 diabetes, HbA1C goal < 8% (H)       order for DME     1 Units    Resmed Aircurve 10 auto bilevel 17/11 cm, Mirage Fx Wide nasal mask w/chin strap.    CYRIL (obstructive sleep apnea)       oxyCODONE-acetaminophen 5-325 MG per tablet    PERCOCET    24 tablet    Take 1-2 tablets by mouth every 4 hours as needed for pain (moderate to severe)    Disc disorder of cervical region, Primary osteoarthritis, unspecified site       spironolactone 25 MG tablet    ALDACTONE    180 tablet    Take 1 tablet (25 mg) by mouth 2 times daily    Essential hypertension       TYLENOL PO      Take 1,300 mg by mouth 3 times daily as needed

## 2017-11-03 NOTE — NURSING NOTE
"No chief complaint on file.      Initial /82 (BP Location: Right arm, Patient Position: Chair, Cuff Size: Adult Large)  Pulse 100  Temp 97.5  F (36.4  C) (Temporal)  Wt (!) 329 lb 9.6 oz (149.5 kg)  SpO2 97%  BMI 45.97 kg/m2 Estimated body mass index is 45.97 kg/(m^2) as calculated from the following:    Height as of 8/2/17: 5' 11\" (1.803 m).    Weight as of this encounter: 329 lb 9.6 oz (149.5 kg).  Medication Reconciliation: complete   SHERLYN Smith  "

## 2017-11-03 NOTE — PROGRESS NOTES

## 2017-11-14 ENCOUNTER — TRANSFERRED RECORDS (OUTPATIENT)
Dept: HEALTH INFORMATION MANAGEMENT | Facility: CLINIC | Age: 52
End: 2017-11-14

## 2017-11-18 DIAGNOSIS — Z79.4 TYPE 2 DIABETES MELLITUS WITH DIABETIC AUTONOMIC NEUROPATHY, WITH LONG-TERM CURRENT USE OF INSULIN (H): ICD-10-CM

## 2017-11-18 DIAGNOSIS — D69.6 THROMBOCYTOPENIA (H): ICD-10-CM

## 2017-11-18 DIAGNOSIS — E11.43 TYPE 2 DIABETES MELLITUS WITH DIABETIC AUTONOMIC NEUROPATHY, WITH LONG-TERM CURRENT USE OF INSULIN (H): ICD-10-CM

## 2017-11-18 DIAGNOSIS — Z11.59 NEED FOR HEPATITIS C SCREENING TEST: ICD-10-CM

## 2017-11-18 DIAGNOSIS — E78.5 HYPERLIPIDEMIA LDL GOAL <100: ICD-10-CM

## 2017-11-18 DIAGNOSIS — M1A.09X0 IDIOPATHIC CHRONIC GOUT OF MULTIPLE SITES WITHOUT TOPHUS: ICD-10-CM

## 2017-11-18 LAB
CHOLEST SERPL-MCNC: 195 MG/DL
ERYTHROCYTE [DISTWIDTH] IN BLOOD BY AUTOMATED COUNT: 13.7 % (ref 10–15)
HBA1C MFR BLD: 8.5 % (ref 4.3–6)
HCT VFR BLD AUTO: 45.4 % (ref 40–53)
HDLC SERPL-MCNC: 35 MG/DL
HGB BLD-MCNC: 14.8 G/DL (ref 13.3–17.7)
LDLC SERPL CALC-MCNC: 100 MG/DL
MCH RBC QN AUTO: 30 PG (ref 26.5–33)
MCHC RBC AUTO-ENTMCNC: 32.6 G/DL (ref 31.5–36.5)
MCV RBC AUTO: 92 FL (ref 78–100)
NONHDLC SERPL-MCNC: 160 MG/DL
PLATELET # BLD AUTO: 146 10E9/L (ref 150–450)
RBC # BLD AUTO: 4.93 10E12/L (ref 4.4–5.9)
TRIGL SERPL-MCNC: 301 MG/DL
URATE SERPL-MCNC: 6.2 MG/DL (ref 3.5–7.2)
WBC # BLD AUTO: 5 10E9/L (ref 4–11)

## 2017-11-18 PROCEDURE — 86803 HEPATITIS C AB TEST: CPT | Performed by: FAMILY MEDICINE

## 2017-11-18 PROCEDURE — 36415 COLL VENOUS BLD VENIPUNCTURE: CPT | Performed by: FAMILY MEDICINE

## 2017-11-18 PROCEDURE — 83036 HEMOGLOBIN GLYCOSYLATED A1C: CPT | Mod: QW | Performed by: FAMILY MEDICINE

## 2017-11-18 PROCEDURE — 80061 LIPID PANEL: CPT | Performed by: FAMILY MEDICINE

## 2017-11-18 PROCEDURE — 84550 ASSAY OF BLOOD/URIC ACID: CPT | Performed by: FAMILY MEDICINE

## 2017-11-18 PROCEDURE — 85027 COMPLETE CBC AUTOMATED: CPT | Performed by: FAMILY MEDICINE

## 2017-11-20 LAB — HCV AB SERPL QL IA: NONREACTIVE

## 2017-11-20 NOTE — PROGRESS NOTES
Kalia,  Your results show your triglycerides are elevated, which is related to your diabetes control.  Platelets are low, but this is consistent with past checks.  Please let me know if you have any questions.    Sincerely,  Dr. Boudreaux

## 2017-11-21 ENCOUNTER — HOSPITAL ENCOUNTER (OUTPATIENT)
Dept: ULTRASOUND IMAGING | Facility: CLINIC | Age: 52
Discharge: HOME | End: 2017-11-21
Attending: INTERNAL MEDICINE | Admitting: INTERNAL MEDICINE
Payer: COMMERCIAL

## 2017-11-21 DIAGNOSIS — E04.1 THYROID NODULE: ICD-10-CM

## 2017-11-21 PROCEDURE — 76536 US EXAM OF HEAD AND NECK: CPT

## 2017-11-28 ENCOUNTER — OFFICE VISIT (OUTPATIENT)
Dept: ENDOCRINOLOGY | Facility: CLINIC | Age: 52
End: 2017-11-28
Payer: COMMERCIAL

## 2017-11-28 VITALS
BODY MASS INDEX: 44.1 KG/M2 | SYSTOLIC BLOOD PRESSURE: 152 MMHG | DIASTOLIC BLOOD PRESSURE: 74 MMHG | HEIGHT: 71 IN | WEIGHT: 315 LBS | HEART RATE: 50 BPM

## 2017-11-28 DIAGNOSIS — E11.21 TYPE 2 DIABETES MELLITUS WITH DIABETIC NEPHROPATHY, WITH LONG-TERM CURRENT USE OF INSULIN (H): ICD-10-CM

## 2017-11-28 DIAGNOSIS — E04.2 MULTIPLE THYROID NODULES: ICD-10-CM

## 2017-11-28 DIAGNOSIS — Z79.4 TYPE 2 DIABETES MELLITUS WITH DIABETIC NEPHROPATHY, WITH LONG-TERM CURRENT USE OF INSULIN (H): ICD-10-CM

## 2017-11-28 DIAGNOSIS — I10 BENIGN ESSENTIAL HYPERTENSION: Primary | ICD-10-CM

## 2017-11-28 PROCEDURE — 99214 OFFICE O/P EST MOD 30 MIN: CPT | Performed by: INTERNAL MEDICINE

## 2017-11-28 RX ORDER — AMLODIPINE BESYLATE 5 MG/1
5 TABLET ORAL DAILY
Qty: 90 TABLET | Refills: 3 | Status: SHIPPED | OUTPATIENT
Start: 2017-11-28 | End: 2018-11-28

## 2017-11-28 NOTE — NURSING NOTE
"Kalia Boateng's goals for this visit include:   Chief Complaint   Patient presents with     Diabetes       He requests these members of his care team be copied on today's visit information: Scottie Boudreaux      PCP: Scottie Boudreaux    Referring Provider:  No referring provider defined for this encounter.    Chief Complaint   Patient presents with     Diabetes       Initial /74  Pulse 50  Ht 1.791 m (5' 10.5\")  Wt (!) 149.3 kg (329 lb 4 oz)  BMI 46.57 kg/m2 Estimated body mass index is 46.57 kg/(m^2) as calculated from the following:    Height as of this encounter: 1.791 m (5' 10.5\").    Weight as of this encounter: 149.3 kg (329 lb 4 oz).  Medication Reconciliation: complete    Do you need any medication refills at today's visit? No    Astrid Neri LPN      "
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
## 2017-11-28 NOTE — MR AVS SNAPSHOT
After Visit Summary   11/28/2017    Kalia Boateng    MRN: [id number]           Patient Information     Date Of Birth          1965        Visit Information        Provider Department      11/28/2017 3:45 PM Gin Ferreira MD; [facility] NURSE [facility]        Today's Diagnoses     Benign essential hypertension    -  1      Care Instructions    Take one U Novolog per 3 grams carbohydrates for dinner   For 1 week, consistently check BG before breakfast and at bedtime and send us the BG numbers       Sending blood sugars to your provider at [facility]:  We want to help you with your diabetes management, which often requires frequent adjustments to your therapy. For your convenience, we have several ways to send your blood sugars to your doctor for review.    - Send message directly to your doctor through My Chart.  Please ask the rooming staff if you would like to sign up for My Chart.  This is a fast and confidential way to send your information and communicate directly with your provider.   - Record readings and fax to [phone].  We have a template for you to use for your convenience.  - Stop by the clinic with your meter for download if advised by provider.   - My Chart or call Madison Douglas, Diabetes Educator at [phone]  - Call the clinic and speak to one of the endocrine nurses to relay information on the telephone.  Yessenia Hahn or Astrid at [phone].   -    Please call the on-call Endocrinologist at the [facility] for after       hours/weekend needs at [phone] Option #4.    Please note that you do not need to FAST if you are just having an A1C drawn. Please remember to ALWAYS bring your glucose meter with to your appointment. This data is very important for the management of your care.    Thank you!  Your [facility] Care Team                Follow-ups after your visit        Your next 10 appointments already scheduled     Feb 06, 2018   4:00 PM CST   [facility]   [facility]    [address] [place] [id number] [phone]            Apr 18, 2018  3:45 PM CDT   Return Visit with Gin Ferreira MD, [facility] ENDO NURSE   [facility]    [address] [place] [id number] [phone]              Who to contact     If you have questions or need follow up information about today's clinic visit or your schedule please contact [facility] directly at [phone].  Normal or non-critical lab and imaging results will be communicated to you by [employer], letter or phone within 4 business days after the clinic has received the results. If you do not hear from us within 7 days, please contact the clinic through [employer] or phone. If you have a critical or abnormal lab result, we will notify you by phone as soon as possible.  Submit refill requests through [employer]TWORK or call your pharmacy and they will forward the refill request to us. Please allow 3 business days for your refill to be completed.          Additional Information About Your Visit        [employer] Information     N[employer]TWORK gives you secure access to your electronic health record. If you see a primary care provider, you can also send messages to your care team and make appointments. If you have questions, please call your primary care clinic.  If you do not have a primary care provider, please call [phone] and they will assist you.      N[employer]TWORK is an electronic gateway that provides easy, online access to your medical records. With [employer]TWORK, you can request a clinic appointment, read your test results, renew a prescription or communicate with your care team.     To access your existing account, please contact your [facility] or call [phone] for assistance.        Care EveryWhere ID     This  "is your Care EveryWhere ID. This could be used by other organizations to access your [facility] medical records  [id number]        Your Vitals Were     Pulse Height BMI (Body Mass Index)             50 1.791 m (5' 10.5\") 46.57 kg/m2          Blood Pressure from Last 3 Encounters:   11/28/17 152/74   11/03/17 138/82   09/29/17 138/78    Weight from Last 3 Encounters:   11/28/17 (!) 149.3 kg (329 lb 4 oz)   11/03/17 (!) 149.5 kg (329 lb 9.6 oz)   09/29/17 (!) 148.6 kg (327 lb 9.6 oz)              Today, you had the following     No orders found for display         Today's Medication Changes          These changes are accurate as of: 11/28/17  5:00 PM.  If you have any questions, ask your nurse or doctor.               Start taking these medicines.        Dose/Directions    amLODIPine 5 MG tablet   Commonly known as:  NORVASC   Used for:  Benign essential hypertension   Started by:  Gin Ferreira MD        Dose:  5 mg   Take 1 tablet (5 mg) by mouth daily   Quantity:  90 tablet   Refills:  3         These medicines have changed or have updated prescriptions.        Dose/Directions    insulin degludec 200 UNIT/ML pen   Commonly known as:  TRESIBA FLEXTOUCH   This may have changed:  additional instructions   Used for:  Type 2 diabetes mellitus with diabetic autonomic neuropathy, with long-term current use of insulin (H)        Take 28 units daily at bedtime.   Quantity:  30 mL   Refills:  3            Where to get your medicines      These medications were sent to [address]  [address], [facility] [address]     Phone:  [phone]     amLODIPine 5 MG tablet                Primary Care Provider Office Phone # Fax #    Scottie Boudreaux [phone]       [address] [place] [address]        Equal Access to Services     BRANDI RAMÍREZ AH: Imtiaz rosenthal Socristel, waaxda luqadaha, qaybta kaalmasuresh hinkle, cuco turner " lamargaret robins. So [facility] [phone].    ATENCIÓN: Si habla vickie, tiene a barron disposición servicios gratuitos de asistencia lingüística. Terra al [phone].    We comply with applicable federal civil rights laws and [place] laws. We do not discriminate on the basis of race, color, national origin, age, disability, sex, sexual orientation, or gender identity.            Thank you!     Thank you for choosing [facility]  for your care. Our goal is always to provide you with excellent care. Hearing back from our patients is one way we can continue to improve our services. Please take a few minutes to complete the written survey that you may receive in the mail after your visit with us. Thank you!             Your Updated Medication List - Protect others around you: Learn how to safely use, store and throw away your medicines at www.disposemymeds.org.          This list is accurate as of: 11/28/17  5:00 PM.  Always use your most recent med list.                   Brand Name Dispense Instructions for use Diagnosis    allopurinol 300 MG tablet    ZYLOPRIM    90 tablet    Take 1 tablet (300 mg) by mouth daily    Idiopathic chronic gout of multiple sites without tophus       amLODIPine 5 MG tablet    NORVASC    90 tablet    Take 1 tablet (5 mg) by mouth daily    Benign essential hypertension       aspirin 81 MG EC tablet     90 tablet    Take 1 tablet (81 mg) by mouth daily    Coronary artery disease involving native coronary artery of native heart without angina pectoris       atorvastatin 40 MG tablet    LIPITOR    90 tablet    Take 1 tablet (40 mg) by mouth daily    Type 2 diabetes, uncontrolled, with neuropathy (H)       blood glucose monitoring lancets     1 Box    Use to test blood sugars 3-4 times daily or as directed.    Type 2 diabetes, uncontrolled, with neuropathy (H)       blood glucose monitoring meter device kit     1 kit    Use to test blood sugars 4 times daily or as directed.    Type 2  diabetes, HbA1C goal < 8% (H)       blood glucose monitoring test strip    ONETOUCH VERIO IQ    350 strip    Use to test blood sugars 3-4 times daily or as directed.    Type 2 diabetes, uncontrolled, with neuropathy (H)       chlorthalidone 25 MG tablet    HYGROTON    90 tablet    Take 1 tablet (25 mg) by mouth daily    Essential hypertension with goal blood pressure less than 140/90       colchicine 0.6 MG tablet      Take  by mouth as needed.        doxazosin 4 MG tablet    CARDURA    90 tablet    TAKE ONE TABLET BY MOUTH AT BEDTIME    Uncontrolled hypertension       glipiZIDE 10 MG 24 hr tablet    GLUCOTROL XL    180 tablet    TAKE 2 TABLETS BY MOUTH EVERY DAY.    Type 2 diabetes mellitus with diabetic neuropathy (H)       insulin degludec 200 UNIT/ML pen    TRESIBA FLEXTOUCH    30 mL    Take 28 units daily at bedtime.    Type 2 diabetes mellitus with diabetic autonomic neuropathy, with long-term current use of insulin (H)       insulin pen needle 31G X 8 MM    B-D U/F    200 each    Use twice daily or as directed.    Type 2 diabetes, uncontrolled, with neuropathy (H)       lisinopril 20 MG tablet    PRINIVIL/ZESTRIL    180 tablet    Take 1 tablet (20 mg) by mouth 2 times daily    Essential hypertension       metFORMIN 500 MG 24 hr tablet    GLUCOPHAGE-XR    360 tablet    TAKE 4 TABLETS BY MOUTH WITH DINNER    Type 2 diabetes mellitus with autonomic neuropathy (H)       NovoLOG FLEXPEN 100 UNIT/ML injection   Generic drug:  insulin aspart     12 mL    Administer 1 U per 7 grams carbohydrates prior to dinner.    Type 2 diabetes mellitus with diabetic autonomic neuropathy, with long-term current use of insulin (H)       order for DME     1 Bottle    Equipment being ordered: CONTROL SOLUTION for checking BS.    Type 2 diabetes, HbA1C goal < 8% (H)       order for DME     1 Units    Resmed Aircurve 10 auto bilevel 17/11 cm, Mirage Fx Wide nasal mask w/chin strap.    CYRIL (obstructive sleep apnea)        oxyCODONE-acetaminophen 5-325 MG per tablet    PERCOCET    24 tablet    Take 1-2 tablets by mouth every 4 hours as needed for pain (moderate to severe)    Disc disorder of cervical region, Primary osteoarthritis, unspecified site       spironolactone 25 MG tablet    ALDACTONE    180 tablet    Take 1 tablet (25 mg) by mouth 2 times daily    Essential hypertension       TYLENOL PO      Take 1,300 mg by mouth 3 times daily as needed
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
## 2017-11-28 NOTE — PATIENT INSTRUCTIONS
Take one U Novolog per 3 grams carbohydrates for dinner   For 1 week, consistently check BG before breakfast and at bedtime and send us the BG numbers       Sending blood sugars to your provider at [facility]:  We want to help you with your diabetes management, which often requires frequent adjustments to your therapy. For your convenience, we have several ways to send your blood sugars to your doctor for review.    - Send message directly to your doctor through My Chart.  Please ask the rooming staff if you would like to sign up for My Chart.  This is a fast and confidential way to send your information and communicate directly with your provider.   - Record readings and fax to [phone].  We have a template for you to use for your convenience.  - Stop by the clinic with your meter for download if advised by provider.   - My Chart or call Madison Douglas, Diabetes Educator at [phone]  - Call the clinic and speak to one of the endocrine nurses to relay information on the telephone.  Yessenia Hahn, or Astrid at [phone].   -    Please call the on-call Endocrinologist at the [facility] for after       hours/weekend needs at [phone] Option #4.    Please note that you do not need to FAST if you are just having an A1C drawn. Please remember to ALWAYS bring your glucose meter with to your appointment. This data is very important for the management of your care.    Thank you!  Your [facility] Care Team

## 2017-12-04 PROBLEM — E11.21 TYPE 2 DIABETES MELLITUS WITH DIABETIC NEPHROPATHY, WITH LONG-TERM CURRENT USE OF INSULIN (H): Status: ACTIVE | Noted: 2017-12-04

## 2017-12-04 PROBLEM — Z79.4 TYPE 2 DIABETES MELLITUS WITH DIABETIC AUTONOMIC NEUROPATHY, WITH LONG-TERM CURRENT USE OF INSULIN (H): Status: RESOLVED | Noted: 2017-01-22 | Resolved: 2017-12-04

## 2017-12-04 PROBLEM — E04.2 MULTIPLE THYROID NODULES: Status: ACTIVE | Noted: 2017-12-04

## 2017-12-04 PROBLEM — E11.43 TYPE 2 DIABETES MELLITUS WITH DIABETIC AUTONOMIC NEUROPATHY, WITH LONG-TERM CURRENT USE OF INSULIN (H): Status: RESOLVED | Noted: 2017-01-22 | Resolved: 2017-12-04

## 2017-12-04 PROBLEM — Z79.4 TYPE 2 DIABETES MELLITUS WITH DIABETIC NEPHROPATHY, WITH LONG-TERM CURRENT USE OF INSULIN (H): Status: ACTIVE | Noted: 2017-12-04

## 2018-02-06 ENCOUNTER — OFFICE VISIT (OUTPATIENT)
Dept: PULMONOLOGY | Facility: CLINIC | Age: 53
End: 2018-02-06
Payer: COMMERCIAL

## 2018-02-06 VITALS
HEART RATE: 67 BPM | SYSTOLIC BLOOD PRESSURE: 150 MMHG | BODY MASS INDEX: 46.11 KG/M2 | OXYGEN SATURATION: 94 % | TEMPERATURE: 98.5 F | DIASTOLIC BLOOD PRESSURE: 72 MMHG | WEIGHT: 315 LBS

## 2018-02-06 DIAGNOSIS — G47.33 OSA (OBSTRUCTIVE SLEEP APNEA): Primary | Chronic | ICD-10-CM

## 2018-02-06 PROCEDURE — 99213 OFFICE O/P EST LOW 20 MIN: CPT | Performed by: INTERNAL MEDICINE

## 2018-02-06 NOTE — MR AVS SNAPSHOT
After Visit Summary   2/6/2018    Kalia Boateng    MRN: [id number]           Patient Information     Date Of Birth          1965        Visit Information        Provider Department      2/6/2018 4:00 PM Malathi Barrera MD [facility]        Today's Diagnoses     CYRIL (obstructive sleep apnea) Severe    -  1    Sleep-related hypoventilation          Care Instructions    1.  Continue CPAP every night for all hours that you are sleeping.  If you nap use CPAP.  As always, try to get at least 8 hours of sleep or more each day, and avoid sleep deprivation. Avoid alcohol.    2.  Reasons that you might need a change to your pressure therapy would be weight gain or loss, waking having inadvertently removed your CPAP overnight, having previously felt refreshed by sleep with CPAP use and now waking un-refreshed, and return of daytime sleepiness. Also, the development of new medical problems can sometimes affect breathing at night-heart failure, stroke, medications such as narcotics.    3.  Please bring CPAP with you if you are hospitalized.  If anticipating surgery be sure to discuss with your surgeon that you have sleep apnea and use PAP therapy.      4.  Maintain your equipment as recommended which includes routine cleaning and replacement of supplies.  Call DME for any questions regarding supplies or maintenance.      5.  Do not drive on engage in potentially dangerous activities if feeling sleepy.    6.  Please see me again in 12 months and bring your machine and card to your follow-up visit.      [facility] DME and cpap specialist  [phone]  [facility] [phone]    [facility] DME [phone], CPAP specialist [phone]  [facility] [phone]    [place] [facility], [facility] [phone]    [facility] DME  Janis Bell [phone]    [facility] [place] [phone]  [facility] phone [phone]         Tips for your CPAP and BIPAP use-    Mask fitting tips  Mask fitting exercise:    To improve your mask seal and your mobility at night, put mask on and secure in place.  Lie down in bed with full pressure and roll to one side, adjust headgear while in that position to eliminate any leaks. Repeat process rolling to other side.     The mask seal does not have to be perfect:   CPAP machines are designed to make up for small leaks. However, you will not tolerate leaks blowing in your eyes so you will need to adjust.   Any leak should only be near or at the bottom of the mask.  We expect your mask to leak slightly at night.    Do not over-tighten the headgear straps, tighter IS NOT better, we expect minimal leak.    First try re-positioning the mask or headgear before tightening the headgear straps.  Mask leaks are expected due to changing sleeping positions. Try pulling the mask away from your skin allowing the cushion to re-inflate will minimize the leak.  If you struggle for a good fit, try turning the CPAP off and then readjust the mask by pulling it away from your face and then turning back on the CPAP.        Humidifier tips  Humidifiers can be adjusted to increase or decrease the amount of moisture according to your comfort level. You may need to adjust this frequently at first, but then might only change it with seasonal weather changes.     Try INCREASING the humidity if:  You experience a dry, irritated nasal passage or throat.  You have a runny, drippy nose or sneezing fits after using CPAP.  You experience nasal congestion during or after CPAP use.    Try DECREASING the humidity if:  You have excessive condensation or  rain out  in the tubing or mask.  Otherwise keep the tubing warm during the night by running it underneath the blankets or pillow.      Clinic visit after  initial CPAP and BIPAP set-up   Bring your equipment with you to your 4 week follow up clinic visit.  We will be extracting your data from the machine.        Travel  Always take your equipment with you.  If you fly with your equipment bring it on with you as a carry on.  Medical equipment does not count as a carry on.    If you travel international the machines take 110-240.  The only adapter needed is the adapter that will fit into the receptacle (outlet).    You may also want to bring an extension cord as many hot rooms have limited outlets at the bedside.  Do not travel with water in your humidifier chamber.     Cleaning and Maintenance Guidelines    Equipment Frequency Cleaning Method   Mask First Day    Daily      Weekly Soak mask in hot soapy water for 30 minutes, rinse and air dry.  Wipe nasal cushion with a hot soapy (Ivory, baby shampoo) cloth and rinse.  Baby wipes may also be used.  Do not use anti-bacterial soaps,Destini  liquid soap, rubbing alcohol, bleach or ammonia.  Wash frame in hot soapy water (Ivory, baby shampoo) rinse and let air dry   Headgear Biweekly Wash in hot soapy water, rinse and air dry   Reusable Gray Filter Weekly Wash in hot soapy water, rinse, put in towel squeeze moisture out, let air dry   Disposable White Filter Check Weekly Replace when brown or gray in color; at least every 2 to 3 months   Humidifier Chamber Daily    Weekly Empty distilled water from humidifier and let air dry    Hand wash in hot soapy water, rinse and air dry   Tubing Weekly Wash in hot soapy water, rinse and let air dry   Mask, Tubing and Humidifier Chamber As needed Disinfect: Soak in 1 part vinegar to 3 parts hot water for 30 minutes, rinse well and air dry  Not the material headgear        MASK AND SUPPLY REORDERING  Reminder: Most insurance companies will allow for a new mask, headgear, tubing, and reusable gray filter every six months.  Disposable white ultra-fine filters are covered  monthly.    EQUIPMENT NEEDS  Please call our CPAP specialist with any equipment problems, questions or needs.    HOME AND SAFETY INSTRUCTIONS    Do not use frayed or cracked electrical cords, multi plug adaptors, or switched receptacles    Do not immerse electrical equipment into water    Assure that electrical cords do not become a tripping hazard              Follow-ups after your visit        Follow-up notes from your care team     Return in about 1 year (around 2/6/2019).      Your next 10 appointments already scheduled     Apr 18, 2018  3:45 PM CDT   Return Visit with Gin Ferreira MD, [facility] END[facility] NURSE   [facility]    [address] [place] [id number] [phone]            Feb 05, 2019  4:00 PM CST   Return Visit with Malathi Barrera MD   [facility]    [address] [place] [id number] [phone]              Who to contact     If you have questions or need follow up information about today's clinic visit or your schedule please contact [facility] directly at [phone].  Normal or non-critical lab and imaging results will be communicated to you by MyChart, letter or phone within 4 business days after the clinic has received the results. If you do not hear from us within 7 days, please contact the clinic through [employer] or phone. If you have a critical or abnormal lab result, we will notify you by phone as soon as possible.  Submit refill requests through [employer] or call your pharmacy and they will forward the refill request to us. Please allow 3 business days for your refill to be completed.          Additional Information About Your Visit        [employer] [employer] Information     [employer] gives you secure access to your electronic health record. If you see a primary care provider, you can also send messages to your care team and make appointments. If you  have questions, please call your primary care clinic.  If you do not have a primary care provider, please call [phone] and they will assist you.      [employer] is an electronic gateway that provides easy, online access to your medical records. With [employer], you can request a clinic appointment, read your test results, renew a prescription or communicate with your care team.     To access your existing account, please contact your [facility] or call [phone] for assistance.        Care EveryWhere ID     This is your Care EveryWhere ID. This could be used by other organizations to access your [place] medical records  [id number]        Your Vitals Were     Pulse Temperature Pulse Oximetry BMI (Body Mass Index)          67 98.5  F (36.9  C) (Oral) 94% 46.11 kg/m2         Blood Pressure from Last 3 Encounters:   02/06/18 150/72   11/28/17 152/74   11/03/17 138/82    Weight from Last 3 Encounters:   02/06/18 (!) 147.9 kg (326 lb)   11/28/17 (!) 149.3 kg (329 lb 4 oz)   11/03/17 (!) 149.5 kg (329 lb 9.6 oz)              We Performed the Following     Comprehensive DME          Today's Medication Changes          These changes are accurate as of 2/6/18  4:49 PM.  If you have any questions, ask your nurse or doctor.               These medicines have changed or have updated prescriptions.        Dose/Directions    insulin degludec 200 UNIT/ML pen   Commonly known as:  TRESIBA FLEXTOUCH   This may have changed:  additional instructions   Used for:  Type 2 diabetes mellitus with diabetic autonomic neuropathy, with long-term current use of insulin (H)        Take 28 units daily at bedtime.   Quantity:  30 mL   Refills:  3                Primary Care Provider Office Phone # Fax #    Scottie Boudreuax [phone]       [address] [place] [id number]        Equal Access to Services     BRANDI RAMÍREZ AH: Imtiaz Perera, juan villa, nathaniel bhakta  cuco hinkleflex [place] ah. Kathi [facility] [phone].    ATENCIÓN: Si habla vickie, tiene a barron disposición servicios gratuitos de asistencia lingüística. Terra al [phone].    We comply with applicable federal civil rights laws and [place] laws. We do not discriminate on the basis of race, color, national origin, age, disability, sex, sexual orientation, or gender identity.            Thank you!     Thank you for choosing [facility]  for your care. Our goal is always to provide you with excellent care. Hearing back from our patients is one way we can continue to improve our services. Please take a few minutes to complete the written survey that you may receive in the mail after your visit with us. Thank you!             Your Updated Medication List - Protect others around you: Learn how to safely use, store and throw away your medicines at www.disposemymeds.org.          This list is accurate as of 2/6/18  4:49 PM.  Always use your most recent med list.                   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    Type 2 diabetes, uncontrolled, with neuropathy (H)       blood glucose monitoring meter device kit     1 kit    Use to test blood sugars 4  times daily or as directed.    Type 2 diabetes, HbA1C goal < 8% (H)       blood glucose monitoring test strip    ONETOUCH VERIO IQ    350 strip    Use to test blood sugars 3-4 times daily or as directed.    Type 2 diabetes, uncontrolled, with neuropathy (H)       chlorthalidone 25 MG tablet    HYGROTON    90 tablet    TAKE ONE TABLET BY MOUTH ONCE DAILY    Essential hypertension with goal blood pressure less than 140/90       colchicine 0.6 MG tablet      Take  by mouth as needed.        doxazosin 4 MG tablet    CARDURA    90 tablet    TAKE ONE TABLET BY MOUTH AT BEDTIME    Uncontrolled hypertension       glipiZIDE 10 MG 24 hr tablet    GLUCOTROL XL    180 tablet    TAKE 2 TABLETS BY MOUTH EVERY DAY.    Type 2 diabetes mellitus with diabetic neuropathy (H)       insulin degludec 200 UNIT/ML pen    TRESIBA FLEXTOUCH    30 mL    Take 28 units daily at bedtime.    Type 2 diabetes mellitus with diabetic autonomic neuropathy, with long-term current use of insulin (H)       insulin pen needle 31G X 8 MM    B-D U/F    200 each    Use twice daily or as directed.    Type 2 diabetes, uncontrolled, with neuropathy (H)       lisinopril 20 MG tablet    PRINIVIL/ZESTRIL    180 tablet    Take 1 tablet (20 mg) by mouth 2 times daily    Essential hypertension       metFORMIN 500 MG 24 hr tablet    GLUCOPHAGE-XR    360 tablet    TAKE 4 TABLETS BY MOUTH WITH DINNER    Type 2 diabetes mellitus with autonomic neuropathy (H)       NovoLOG FLEXPEN 100 UNIT/ML injection   Generic drug:  insulin aspart     12 mL    Administer 1 U per 7 grams carbohydrates prior to dinner.    Type 2 diabetes mellitus with diabetic autonomic neuropathy, with long-term current use of insulin (H)       order for DME     1 Bottle    Equipment being ordered: CONTROL SOLUTION for checking BS.    Type 2 diabetes, HbA1C goal < 8% (H)       order for DME     1 Units    Resmed Aircurve 10 auto bilevel 17/11 cm, Mirage Fx Wide nasal mask w/chin strap.    CYRIL  (obstructive sleep apnea)       oxyCODONE-acetaminophen 5-325 MG per tablet    PERCOCET    24 tablet    Take 1-2 tablets by mouth every 4 hours as needed for pain (moderate to severe)    Disc disorder of cervical region, Primary osteoarthritis, unspecified site       spironolactone 25 MG tablet    ALDACTONE    180 tablet    Take 1 tablet (25 mg) by mouth 2 times daily    Essential hypertension       TYLENOL PO      Take 1,300 mg by mouth 3 times daily as needed

## 2018-02-06 NOTE — NURSING NOTE
"Kalia Boateng's goals for this visit include: CYRIL  He requests these members of his care team be copied on today's visit information: yes    PCP: Scottie Boudreaux    Referring Provider:  No referring provider defined for this encounter.    Chief Complaint   Patient presents with     Sleep Apnea       Initial /72 (BP Location: Left arm, Patient Position: Chair, Cuff Size: Adult Large)  Pulse 67  Temp 98.5  F (36.9  C) (Oral)  Wt (!) 147.9 kg (326 lb)  SpO2 94%  BMI 46.11 kg/m2 Estimated body mass index is 46.11 kg/(m^2) as calculated from the following:    Height as of 11/28/17: 1.791 m (5' 10.5\").    Weight as of this encounter: 147.9 kg (326 lb).  Medication Reconciliation: complete    Do you need any medication refills at today's visit? no    "

## 2018-02-06 NOTE — PATIENT INSTRUCTIONS
1.  Continue CPAP every night for all hours that you are sleeping.  If you nap use CPAP.  As always, try to get at least 8 hours of sleep or more each day, and avoid sleep deprivation. Avoid alcohol.    2.  Reasons that you might need a change to your pressure therapy would be weight gain or loss, waking having inadvertently removed your CPAP overnight, having previously felt refreshed by sleep with CPAP use and now waking un-refreshed, and return of daytime sleepiness. Also, the development of new medical problems can sometimes affect breathing at night-heart failure, stroke, medications such as narcotics.    3.  Please bring CPAP with you if you are hospitalized.  If anticipating surgery be sure to discuss with your surgeon that you have sleep apnea and use PAP therapy.      4.  Maintain your equipment as recommended which includes routine cleaning and replacement of supplies.  Call DME for any questions regarding supplies or maintenance.      5.  Do not drive on engage in potentially dangerous activities if feeling sleepy.    6.  Please see me again in 12 months and bring your machine and card to your follow-up visit.      [facility] DME and cpap specialist  [phone]  [facility] [phone]    [facility] DME [phone], CPAP specialist [phone]  [facility] [phone]    [place] [facility], [facility] [phone]    [facility] DME  Janis Bell [phone]    [facility] [place] DME [place] [phone]  [facility] DME phone [phone]         Tips for your CPAP and BIPAP use-    Mask fitting tips  Mask fitting exercise:    To improve your mask seal and your mobility at night, put mask on and secure in place.  Lie down in bed with full pressure and roll to one side, adjust headgear while in that position to eliminate  any leaks. Repeat process rolling to other side.     The mask seal does not have to be perfect:   CPAP machines are designed to make up for small leaks. However, you will not tolerate leaks blowing in your eyes so you will need to adjust.   Any leak should only be near or at the bottom of the mask.  We expect your mask to leak slightly at night.    Do not over-tighten the headgear straps, tighter IS NOT better, we expect minimal leak.    First try re-positioning the mask or headgear before tightening the headgear straps.  Mask leaks are expected due to changing sleeping positions. Try pulling the mask away from your skin allowing the cushion to re-inflate will minimize the leak.  If you struggle for a good fit, try turning the CPAP off and then readjust the mask by pulling it away from your face and then turning back on the CPAP.        Humidifier tips  Humidifiers can be adjusted to increase or decrease the amount of moisture according to your comfort level. You may need to adjust this frequently at first, but then might only change it with seasonal weather changes.     Try INCREASING the humidity if:  You experience a dry, irritated nasal passage or throat.  You have a runny, drippy nose or sneezing fits after using CPAP.  You experience nasal congestion during or after CPAP use.    Try DECREASING the humidity if:  You have excessive condensation or  rain out  in the tubing or mask.  Otherwise keep the tubing warm during the night by running it underneath the blankets or pillow.      Clinic visit after initial CPAP and BIPAP set-up   Bring your equipment with you to your 4 week follow up clinic visit.  We will be extracting your data from the machine.        Travel  Always take your equipment with you.  If you fly with your equipment bring it on with you as a carry on.  Medical equipment does not count as a carry on.    If you travel international the machines take 110-240.  The only adapter needed is the adapter  that will fit into the receptacle (outlet).    You may also want to bring an extension cord as many hotel rooms have limited outlets at the bedside.  Do not travel with water in your humidifier chamber.     Cleaning and Maintenance Guidelines    Equipment Frequency Cleaning Method   Mask First Day    Daily      Weekly Soak mask in hot soapy water for 30 minutes, rinse and air dry.  Wipe nasal cushion with a hot soapy (Ivory, baby shampoo) cloth and rinse.  Baby wipes may also be used.  Do not use anti-bacterial soaps,Destini  liquid soap, rubbing alcohol, bleach or ammonia.  Wash frame in hot soapy water (Ivory, baby shampoo) rinse and let air dry   Headgear Biweekly Wash in hot soapy water, rinse and air dry   Reusable Gray Filter Weekly Wash in hot soapy water, rinse, put in towel squeeze moisture out, let air dry   Disposable White Filter Check Weekly Replace when brown or gray in color; at least every 2 to 3 months   Humidifier Chamber Daily    Weekly Empty distilled water from humidifier and let air dry    Hand wash in hot soapy water, rinse and air dry   Tubing Weekly Wash in hot soapy water, rinse and let air dry   Mask, Tubing and Humidifier Chamber As needed Disinfect: Soak in 1 part vinegar to 3 parts hot water for 30 minutes, rinse well and air dry  Not the material headgear        MASK AND SUPPLY REORDERING  Reminder: Most insurance companies will allow for a new mask, headgear, tubing, and reusable gray filter every six months.  Disposable white ultra-fine filters are covered monthly.    EQUIPMENT NEEDS  Please call our CPAP specialist with any equipment problems, questions or needs.    HOME AND SAFETY INSTRUCTIONS    Do not use frayed or cracked electrical cords, multi plug adaptors, or switched receptacles    Do not immerse electrical equipment into water    Assure that electrical cords do not become a tripping hazard

## 2018-02-07 NOTE — PROGRESS NOTES
Visit Date:   02/06/2018      CHIEF COMPLAINT:  Followup of obstructive sleep apnea.      HISTORY OF PRESENT ILLNESS:  A 52-year-old gentleman with history of type 2 diabetes, hypertension, nonobstructive coronary disease.  He is here today for followup.  He states that he continues to use his BiPAP nightly.  He recently had a cold and was unable to use it for a few days but he does take it with him when he travels.  He felt like he could sleep better when he was on vacation.  Usually he has difficulty getting enough sleep; he gets up spontaneously around 5:30 in the morning and has a hard time going to bed early.  He denies any problems with a pressure settings of his device.  He uses a full face mask which he finds comfortable.  He drinks about 3 cups of coffee a day and occasional energy drink about 2 times a week.  He is a rare alcohol user and nonsmoker.  He does have secondhand smoke exposure.  [place] is elevated at 13.  He denies any symptoms of sleepiness behind the wheel.   Past medical history, allergies and medications were reviewed.      He is not taking any sleep aids.     Download of data from his device from the last 30 days shows percent of days used greater than 4 hours of 70%, with a gap associated with his infection noted.    Average use on days used is 5 hours and 40 minutes.    Current settings are spontaneous small bilevel with IPAP of 17, EPAP of 11.    Overall AHI on these settings is optimally treated at 1.1.    There do not appear to be significant leak issues.    Respiratory rates median value 23, tidal volume median value 487.      PHYSICAL EXAMINATION:   GENERAL:  Pleasant, obese gentleman in no distress.   VITAL SIGNS:  Blood pressure 150/72.  Pulse is 67, temperature 98.5, O2 saturation is 94% on room air.  Weight is 326 pounds for a body mass index of 26.  Weight is essentially unchanged from this time last year, fluctuates within about 10 pounds.      ASSESSMENT:  A 52-year-old  gentleman with a history of severe obstructive sleep apnea complicated by hypoventilation and hypoxemia, currently doing well on his current settings was optimally treated sleep apnea.  He remains with some elevated [place] score with sleepiness but no sleepiness behind the wheel.  Likely this is related to inadequate sleep.         PLAN:     1.  A script generated to keep his supplies up-to-date.   2.  Counseled about the importance of increasing sleep time.  Suggested at least 15 minutes to half an hour earlier bedtime.  He should make sure that the first signs of sleepiness, he is not letting himself fall asleep in the chair, he should go to bed.  The patient was also counseled on the importance of weight control in treating his sleep apnea. See instruction for further details of counseling provided. He will contact me if any new issues arise.  Otherwise, continue followup on a yearly basis.      Fifteen minutes spent with patient, greater than 50% spent in counseling and coordinating care.         TORI MERCER MD             D: 2018   T: 2018   MT: BRENDAN      Name:     KRZYSZTOF AVILA   MRN:      [id number]        Account:      [id number]   :      1965           Visit Date:   2018      Document: [id number]

## 2018-03-16 DIAGNOSIS — E11.43 TYPE 2 DIABETES MELLITUS WITH AUTONOMIC NEUROPATHY (H): ICD-10-CM

## 2018-03-16 RX ORDER — METFORMIN HCL 500 MG
TABLET, EXTENDED RELEASE 24 HR ORAL
Qty: 360 TABLET | Refills: 3 | Status: SHIPPED | OUTPATIENT
Start: 2018-03-16 | End: 2018-12-19

## 2018-04-17 DIAGNOSIS — I10 ESSENTIAL HYPERTENSION: ICD-10-CM

## 2018-04-17 RX ORDER — SPIRONOLACTONE 25 MG/1
TABLET ORAL
Qty: 180 TABLET | Refills: 3 | Status: CANCELLED | OUTPATIENT
Start: 2018-04-17

## 2018-04-17 NOTE — TELEPHONE ENCOUNTER
Received refill request for spironolactone 25 mcg twice daily.    Patient has office visit tomorrow (4/18/18) with Dr. Ferreira. Will wait for clinic visit to refill.     Selma Stevenson RN  Endocrine Care Coordinator  [facility]

## 2018-04-18 ENCOUNTER — OFFICE VISIT (OUTPATIENT)
Dept: ENDOCRINOLOGY | Facility: CLINIC | Age: 53
End: 2018-04-18
Payer: COMMERCIAL

## 2018-04-18 VITALS
DIASTOLIC BLOOD PRESSURE: 83 MMHG | HEIGHT: 70 IN | WEIGHT: 315 LBS | SYSTOLIC BLOOD PRESSURE: 152 MMHG | BODY MASS INDEX: 45.1 KG/M2 | HEART RATE: 66 BPM | OXYGEN SATURATION: 95 %

## 2018-04-18 DIAGNOSIS — Z79.4 TYPE 2 DIABETES MELLITUS WITH DIABETIC AUTONOMIC NEUROPATHY, WITH LONG-TERM CURRENT USE OF INSULIN (H): ICD-10-CM

## 2018-04-18 DIAGNOSIS — E11.43 TYPE 2 DIABETES MELLITUS WITH DIABETIC AUTONOMIC NEUROPATHY, WITH LONG-TERM CURRENT USE OF INSULIN (H): Primary | ICD-10-CM

## 2018-04-18 DIAGNOSIS — E06.3 HASHIMOTO'S THYROIDITIS: ICD-10-CM

## 2018-04-18 DIAGNOSIS — Z79.4 TYPE 2 DIABETES MELLITUS WITH DIABETIC AUTONOMIC NEUROPATHY, WITH LONG-TERM CURRENT USE OF INSULIN (H): Primary | ICD-10-CM

## 2018-04-18 DIAGNOSIS — E11.43 TYPE 2 DIABETES MELLITUS WITH DIABETIC AUTONOMIC NEUROPATHY, WITH LONG-TERM CURRENT USE OF INSULIN (H): ICD-10-CM

## 2018-04-18 DIAGNOSIS — E04.2 MULTIPLE THYROID NODULES: ICD-10-CM

## 2018-04-18 DIAGNOSIS — E78.00 HYPERCHOLESTEROLEMIA: ICD-10-CM

## 2018-04-18 DIAGNOSIS — E04.1 THYROID NODULE: ICD-10-CM

## 2018-04-18 DIAGNOSIS — I10 ESSENTIAL HYPERTENSION WITH GOAL BLOOD PRESSURE LESS THAN 140/90: ICD-10-CM

## 2018-04-18 LAB — HBA1C MFR BLD: 8.9 % (ref 0–5.7)

## 2018-04-18 PROCEDURE — 36415 COLL VENOUS BLD VENIPUNCTURE: CPT | Performed by: INTERNAL MEDICINE

## 2018-04-18 PROCEDURE — 99214 OFFICE O/P EST MOD 30 MIN: CPT | Performed by: INTERNAL MEDICINE

## 2018-04-18 PROCEDURE — 83036 HEMOGLOBIN GLYCOSYLATED A1C: CPT | Performed by: INTERNAL MEDICINE

## 2018-04-18 RX ORDER — LISINOPRIL 20 MG/1
20 TABLET ORAL 2 TIMES DAILY
Qty: 180 TABLET | Refills: 3 | Status: SHIPPED | OUTPATIENT
Start: 2018-04-18 | End: 2018-08-01

## 2018-04-18 RX ORDER — SPIRONOLACTONE 25 MG/1
25 TABLET ORAL 2 TIMES DAILY
Qty: 180 TABLET | Refills: 3 | Status: SHIPPED | OUTPATIENT
Start: 2018-04-18 | End: 2018-08-02

## 2018-04-18 RX ORDER — INSULIN ASPART 100 [IU]/ML
INJECTION, SOLUTION INTRAVENOUS; SUBCUTANEOUS
Qty: 36 ML | Refills: 3 | Status: SHIPPED | OUTPATIENT
Start: 2018-04-18 | End: 2019-05-13

## 2018-04-18 RX ORDER — INSULIN ASPART 100 [IU]/ML
INJECTION, SOLUTION INTRAVENOUS; SUBCUTANEOUS
Qty: 15 ML | Refills: 3 | Status: CANCELLED | OUTPATIENT
Start: 2018-04-18
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
## 2018-04-18 NOTE — PATIENT INSTRUCTIONS
Change insulin to crab ratio to 1 U per 2 grams carbohydrates for dinner   If you have cereals or oatmeal for breakfast, take 15 U Novolog for them   DEXJOSEPH Casas will My Chart Friday with any additional follow up.    [facility]  Madison Douglas RN, Diabetes Educator: [phone]  Clinic Nurses [place] or [place]: [phone]  Clinic Fax: [phone]  On-Call Endocrine at [facility] (after hours/weekends): [phone] option 4  Scheduling Line: [phone]    Appointment Reminders:  * Please bring meter with for staff to download  * If you are due ONLY for an A1C, it is scheduled with the nurse and will be done in clinic. You do not need to schedule a lab appointment. Fasting is not required for an A1C.  * Refill request should be submitted to your pharmacy. They will contact clinic for approval.

## 2018-04-18 NOTE — MR AVS SNAPSHOT
After Visit Summary   4/18/2018    Kalia Boateng    MRN: [id number]           Patient Information     Date Of Birth          1965        Visit Information        Provider Department      4/18/2018 3:45 PM Gin Ferreira MD; MG ENDO NURSE M [facility]        Today's Diagnoses     Type 2 diabetes mellitus with diabetic autonomic neuropathy, with long-term current use of insulin (H)    -  1    Hashimoto's thyroiditis        Multiple thyroid nodules        Essential hypertension with goal blood pressure less than 140/90        Thyroid nodule        Hypercholesterolemia          Care Instructions    Change insulin to crab ratio to 1 U per 2 grams carbohydrates for dinner   If you have cereals or oatmeal for breakfast, take 15 U Novolog for them   DEXCOM G6     Yessenia will My Chart Friday with any additional follow up.    [facility]-Department of Endocrinology  Madison Douglas RN, Diabetes Educator: [phone]  Clinic Nurses Selma Casas: [phone]  Clinic Fax: [phone]  On-Call Endocrine at [facility] [place] (after hours/weekends): [phone] option 4  Scheduling Line: [phone]    Appointment Reminders:  * Please bring meter with for staff to download  * If you are due ONLY for an A1C, it is scheduled with the nurse and will be done in clinic. You do not need to schedule a lab appointment. Fasting is not required for an A1C.  * Refill request should be submitted to your pharmacy. They will contact clinic for approval.                Follow-ups after your visit        Follow-up notes from your care team     Return in about 3 months (around 7/18/2018).      Your next 10 appointments already scheduled     Apr 23, 2018  4:00 PM CDT   (Arrive by 3:30 PM)   CT ABDOMEN PELVIS W CONTRAST with PHCT1   [facility] CT Scan ([facility])    [address] [address] [phone]           Please bring  any scans or X-rays taken at other hospitals, if similar tests were done. Also bring a list of your medicines, including vitamins, minerals and over-the-counter drugs. It is safest to leave personal items at home.  Be sure to tell your doctor:   If you have any allergies.   If there s any chance you are pregnant.   If you are breastfeeding.  How to prepare:   Do not eat or drink for 2 hours before your exam. If you need to take medicine, you may take it with small sips of water. (We may ask you to take liquid medicine as well.)   Please wear loose clothing, such as a sweat suit or jogging clothes. Avoid snaps, zippers and other metal. We may ask you to undress and put on a hospital gown.  Please arrive 30 minutes early for your CT. Once in the department you might be asked to drink water 15-20 minutes prior to your exam.  If indicated you may be asked to drink an oral contrast in advance of your CT.  If this is the case, the imaging team will let you know or be in contact with you prior to your appointment  Patients over 70 or patients with diabetes or kidney problems:   If you haven t had a blood test (creatinine test) within the last 30 days, the Cardiologist/Radiologist may require you to get this test prior to your exam.  If you have diabetes:   Continue to take your metformin medication on the day of your exam  If you have any questions, please call the Imaging Department where you will have your exam.            Aug 01, 2018  3:45 PM CDT   Return Visit with Gin Ferreira MD, MG ENDO NURSE   [facility])    [address] [address] [place] [id number] [phone]            Feb 05, 2019  4:00 PM CST   Return Visit with Malathi Barrera MD   [facility])    [address] [address] [place] [id number] [phone]              Who to contact     If you have questions or need follow up  "information about today's clinic visit or your schedule please contact [facility] directly at [phone].  Normal or non-critical lab and imaging results will be communicated to you by Lazarus Effecthart, letter or phone within 4 business days after the clinic has received the results. If you do not hear from us within 7 days, please contact the clinic through Lazarus Effecthart or phone. If you have a critical or abnormal lab result, we will notify you by phone as soon as possible.  Submit refill requests through CenTrak or call your pharmacy and they will forward the refill request to us. Please allow 3 business days for your refill to be completed.          Additional Information About Your Visit        Lazarus EffectharClue App Information     C[employer]Trak gives you secure access to your electronic health record. If you see a primary care provider, you can also send messages to your care team and make appointments. If you have questions, please call your primary care clinic.  If you do not have a primary care provider, please call [phone] and they will assist you.      CenTrak is an electronic gateway that provides easy, online access to your medical records. With CenTrak, you can request a clinic appointment, read your test results, renew a prescription or communicate with your care team.     To access your existing account, please contact your [facility] or call [phone] for assistance.        Care EveryWhere ID     This is your Care EveryWhere ID. This could be used by other organizations to access your [facility] medical records  [id number]        Your Vitals Were     Pulse Height Pulse Oximetry BMI (Body Mass Index)          66 1.782 m (5' 10.15\") 95% 46.96 kg/m2         Blood Pressure from Last 3 Encounters:   04/19/18 136/68   04/18/18 152/83   02/06/18 150/72    Weight from Last 3 Encounters:   04/19/18 148.5 kg (327 lb 6.4 oz)   04/18/18 149.1 kg (328 lb 11.3 oz)   02/06/18 (!) 147.9 kg " (326 lb)              We Performed the Following     Hemoglobin A1c POCT          Today's Medication Changes          These changes are accurate as of 4/18/18 11:59 PM.  If you have any questions, ask your nurse or doctor.               These medicines have changed or have updated prescriptions.        Dose/Directions    insulin degludec 200 UNIT/ML pen   Commonly known as:  TRESIBA FLEXTOUCH   This may have changed:  additional instructions   Used for:  Type 2 diabetes mellitus with diabetic autonomic neuropathy, with long-term current use of insulin (H)        Take 28 units daily at bedtime.   Quantity:  30 mL   Refills:  3       NovoLOG FLEXPEN 100 UNIT/ML injection   This may have changed:  additional instructions   Used for:  Type 2 diabetes mellitus with diabetic autonomic neuropathy, with long-term current use of insulin (H)   Generic drug:  insulin aspart   Changed by:  Gin Ferreira MD        Administer 1 U per 2 grams carbohydrates prior to dinner. Total daily dose ~ 40 U.   Quantity:  36 mL   Refills:  3         Stop taking these medicines if you haven't already. Please contact your care team if you have questions.     blood glucose monitoring meter device kit   Stopped by:  Gin Ferreira MD                Where to get your medicines      These medications were sent to [address]  [address] [facility] [address]     Phone:  [phone]     lisinopril 20 MG tablet    NovoLOG FLEXPEN 100 UNIT/ML injection    spironolactone 25 MG tablet                Primary Care Provider Office Phone # Fax #    Scottiebarbara Boudreaux [phone]       [address]   [facility] [address]        Equal Access to Services     [facility]: Hadii rosalino rosenthal Socristel, waaxda luqadaha, qaybta kaalmada aderajeev, cuco robins. [facility] [phone].    ATENCIÓN: Si habla español, tiene a barron disposición servicios  bettie de asistencia lingüística. Terra olsen [phone].    We comply with applicable federal civil rights laws and [place] laws. We do not discriminate on the basis of race, color, national origin, age, disability, sex, sexual orientation, or gender identity.            Thank you!     Thank you for choosing [facility]  for your care. Our goal is always to provide you with excellent care. Hearing back from our patients is one way we can continue to improve our services. Please take a few minutes to complete the written survey that you may receive in the mail after your visit with us. Thank you!             Your Updated Medication List - Protect others around you: Learn how to safely use, store and throw away your medicines at www.disposemymeds.org.          This list is accurate as of 4/18/18 11:59 PM.  Always use your most recent med list.                   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    Type 2 diabetes, uncontrolled, with neuropathy (H)       blood glucose monitoring test strip    ONETOUCH VERIO IQ    350 strip    Use to test blood sugars 3-4 times daily or as directed.    Type 2 diabetes, uncontrolled, with neuropathy (H)       chlorthalidone 25 MG tablet     HYGROTON    90 tablet    TAKE ONE TABLET BY MOUTH ONCE DAILY    Essential hypertension with goal blood pressure less than 140/90       colchicine 0.6 MG tablet      Take  by mouth as needed.        doxazosin 4 MG tablet    CARDURA    90 tablet    TAKE ONE TABLET BY MOUTH AT BEDTIME    Uncontrolled hypertension       glipiZIDE 10 MG 24 hr tablet    GLUCOTROL XL    180 tablet    TAKE 2 TABLETS BY MOUTH EVERY DAY.    Type 2 diabetes mellitus with diabetic neuropathy (H)       insulin degludec 200 UNIT/ML pen    TRESIBA FLEXTOUCH    30 mL    Take 28 units daily at bedtime.    Type 2 diabetes mellitus with diabetic autonomic neuropathy, with long-term current use of insulin (H)       insulin pen needle 31G X 8 MM    B-D U/F    200 each    Use twice daily or as directed.    Type 2 diabetes, uncontrolled, with neuropathy (H)       lisinopril 20 MG tablet    PRINIVIL/ZESTRIL    180 tablet    Take 1 tablet (20 mg) by mouth 2 times daily        metFORMIN 500 MG 24 hr tablet    GLUCOPHAGE-XR    360 tablet    TAKE 4 TABLETS BY MOUTH WITH DINNER.    Type 2 diabetes mellitus with autonomic neuropathy (H)       NovoLOG FLEXPEN 100 UNIT/ML injection   Generic drug:  insulin aspart     36 mL    Administer 1 U per 2 grams carbohydrates prior to dinner. Total daily dose ~ 40 U.    Type 2 diabetes mellitus with diabetic autonomic neuropathy, with long-term current use of insulin (H)       order for DME     1 Bottle    Equipment being ordered: CONTROL SOLUTION for checking BS.    Type 2 diabetes, HbA1C goal < 8% (H)       order for DME     1 Units    Resmed Aircurve 10 auto bilevel 17/11 cm, Mirage Fx Wide nasal mask w/chin strap.    CYRIL (obstructive sleep apnea)       oxyCODONE-acetaminophen 5-325 MG per tablet    PERCOCET    24 tablet    Take 1-2 tablets by mouth every 4 hours as needed for pain (moderate to severe)    Disc disorder of cervical region, Primary osteoarthritis, unspecified site       spironolactone 25 MG tablet     ALDACTONE    180 tablet    Take 1 tablet (25 mg) by mouth 2 times daily        TYLENOL PO      Take 1,300 mg by mouth 3 times daily as needed

## 2018-04-18 NOTE — NURSING NOTE
"Kalia Boateng's goals for this visit include: Follow up Diabetes  He requests these members of his care team be copied on today's visit information: YES    PCP: Scottie Boudreaux    Referring Provider:  No referring provider defined for this encounter.    Chief Complaint   Patient presents with     Diabetes       Initial Ht 1.782 m (5' 10.15\")  Wt 149.1 kg (328 lb 11.3 oz)  BMI 46.96 kg/m2 Estimated body mass index is 46.96 kg/(m^2) as calculated from the following:    Height as of this encounter: 1.782 m (5' 10.15\").    Weight as of this encounter: 149.1 kg (328 lb 11.3 oz).  Medication Reconciliation: complete    Do you need any medication refills at today's visit? YES    "

## 2018-04-18 NOTE — TELEPHONE ENCOUNTER
Refill completed at today's office visit.    Yessenia Wallis LPN  Adult Endocrinology  [facility]
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
## 2018-04-18 NOTE — TELEPHONE ENCOUNTER
Will complete refill at today's office visit.    Yessenia Wallis LPN  Adult Endocrinology  [facility]

## 2018-04-19 ENCOUNTER — OFFICE VISIT (OUTPATIENT)
Dept: FAMILY MEDICINE | Facility: CLINIC | Age: 53
End: 2018-04-19
Payer: COMMERCIAL

## 2018-04-19 ENCOUNTER — TELEPHONE (OUTPATIENT)
Dept: FAMILY MEDICINE | Facility: CLINIC | Age: 53
End: 2018-04-19

## 2018-04-19 VITALS
SYSTOLIC BLOOD PRESSURE: 136 MMHG | WEIGHT: 315 LBS | HEIGHT: 70 IN | TEMPERATURE: 97.8 F | HEART RATE: 94 BPM | OXYGEN SATURATION: 96 % | DIASTOLIC BLOOD PRESSURE: 68 MMHG | BODY MASS INDEX: 45.1 KG/M2

## 2018-04-19 DIAGNOSIS — R10.33 UMBILICAL PAIN: ICD-10-CM

## 2018-04-19 DIAGNOSIS — R10.11 ABDOMINAL PAIN, RIGHT UPPER QUADRANT: Primary | ICD-10-CM

## 2018-04-19 LAB
ALBUMIN SERPL-MCNC: 3.8 G/DL (ref 3.4–5)
ALBUMIN UR-MCNC: 100 MG/DL
ALP SERPL-CCNC: 117 U/L (ref 40–150)
ALT SERPL W P-5'-P-CCNC: 54 U/L (ref 0–70)
ANION GAP SERPL CALCULATED.3IONS-SCNC: 6 MMOL/L (ref 3–14)
APPEARANCE UR: CLEAR
AST SERPL W P-5'-P-CCNC: 41 U/L (ref 0–45)
BASOPHILS # BLD AUTO: 0 10E9/L (ref 0–0.2)
BASOPHILS NFR BLD AUTO: 0.3 %
BILIRUB SERPL-MCNC: 0.3 MG/DL (ref 0.2–1.3)
BILIRUB UR QL STRIP: NEGATIVE
BUN SERPL-MCNC: 33 MG/DL (ref 7–30)
CALCIUM SERPL-MCNC: 9.3 MG/DL (ref 8.5–10.1)
CHLORIDE SERPL-SCNC: 104 MMOL/L (ref 94–109)
CO2 SERPL-SCNC: 29 MMOL/L (ref 20–32)
COLOR UR AUTO: YELLOW
CREAT SERPL-MCNC: 0.95 MG/DL (ref 0.66–1.25)
DIFFERENTIAL METHOD BLD: NORMAL
EOSINOPHIL # BLD AUTO: 0.4 10E9/L (ref 0–0.7)
EOSINOPHIL NFR BLD AUTO: 7.3 %
ERYTHROCYTE [DISTWIDTH] IN BLOOD BY AUTOMATED COUNT: 14.5 % (ref 10–15)
GFR SERPL CREATININE-BSD FRML MDRD: 82 ML/MIN/1.7M2
GLUCOSE SERPL-MCNC: 154 MG/DL (ref 70–99)
GLUCOSE UR STRIP-MCNC: NEGATIVE MG/DL
HCT VFR BLD AUTO: 41 % (ref 40–53)
HGB BLD-MCNC: 13.5 G/DL (ref 13.3–17.7)
HGB UR QL STRIP: ABNORMAL
IMM GRANULOCYTES # BLD: 0 10E9/L (ref 0–0.4)
IMM GRANULOCYTES NFR BLD: 0.2 %
KETONES UR STRIP-MCNC: NEGATIVE MG/DL
LEUKOCYTE ESTERASE UR QL STRIP: NEGATIVE
LYMPHOCYTES # BLD AUTO: 1.2 10E9/L (ref 0.8–5.3)
LYMPHOCYTES NFR BLD AUTO: 19.4 %
MCH RBC QN AUTO: 30.7 PG (ref 26.5–33)
MCHC RBC AUTO-ENTMCNC: 32.9 G/DL (ref 31.5–36.5)
MCV RBC AUTO: 93 FL (ref 78–100)
MONOCYTES # BLD AUTO: 0.4 10E9/L (ref 0–1.3)
MONOCYTES NFR BLD AUTO: 7.3 %
NEUTROPHILS # BLD AUTO: 4 10E9/L (ref 1.6–8.3)
NEUTROPHILS NFR BLD AUTO: 65.5 %
NITRATE UR QL: NEGATIVE
PH UR STRIP: 5.5 PH (ref 5–7)
PLATELET # BLD AUTO: 164 10E9/L (ref 150–450)
POTASSIUM SERPL-SCNC: 3.9 MMOL/L (ref 3.4–5.3)
PROT SERPL-MCNC: 7.6 G/DL (ref 6.8–8.8)
RBC # BLD AUTO: 4.4 10E12/L (ref 4.4–5.9)
RBC #/AREA URNS AUTO: NORMAL /HPF (ref 0–2)
SODIUM SERPL-SCNC: 139 MMOL/L (ref 133–144)
SOURCE: ABNORMAL
SP GR UR STRIP: 1.03 (ref 1–1.03)
UROBILINOGEN UR STRIP-MCNC: 0.2 MG/DL (ref 0–2)
WBC # BLD AUTO: 6 10E9/L (ref 4–11)
WBC #/AREA URNS AUTO: NORMAL /HPF (ref 0–5)

## 2018-04-19 PROCEDURE — 85025 COMPLETE CBC W/AUTO DIFF WBC: CPT | Performed by: FAMILY MEDICINE

## 2018-04-19 PROCEDURE — 99214 OFFICE O/P EST MOD 30 MIN: CPT | Performed by: FAMILY MEDICINE

## 2018-04-19 PROCEDURE — 36415 COLL VENOUS BLD VENIPUNCTURE: CPT | Performed by: FAMILY MEDICINE

## 2018-04-19 PROCEDURE — 80053 COMPREHEN METABOLIC PANEL: CPT | Performed by: FAMILY MEDICINE

## 2018-04-19 PROCEDURE — 81001 URINALYSIS AUTO W/SCOPE: CPT | Performed by: FAMILY MEDICINE

## 2018-04-19 NOTE — TELEPHONE ENCOUNTER
: 1965  PHONE #'s: [phone] (home) [phone] (work)    PRESENTING PROBLEM:  Abdominal pain that radiates into ride side and around his back.    NURSING ASSESSMENT  Description:  As above, He did go to work today.  Onset/duration:  2 days.  Precip. factors:   Etiology unknown  Assoc. Sx:  NO fever, no nausea. NO rash,   Improves/worsens Sx:  Same.   Pain scale (1-10)   4/10  Sx specific meds:  Takes an arthritis medication.   Last exam/Tx:   Has NOT been seen for this.     RECOMMENDED DISPOSITION:  See in 24 hours - misha scheduled for 2:20 PM today with Dr. Camacho per .   Will comply with recommendation: YES  If further questions/concerns or if Sx do not improve, worsen or new Sx develop, call your PCP or [facility] as soon as possible.    NOTES:  Disposition was determined by the first positive assessment question, therefore all previous assessment questions were negative.  Informed to check provider manual or call insurance company to assure coverage.    Guideline used: abdominal pain, Adult  Telephone Triage Protocols for Nurses, Fifth Edition, Maddie Barajas RN  2018

## 2018-04-19 NOTE — TELEPHONE ENCOUNTER
Reason for Call:  Other question    Detailed comments: patients wife calling her  the patient has had lower right side pain starting in the front and goes all the way around his back, for the past week. Patient is wanting to be seen, did put him in with Dr. Camacho at 2:20pm today, but wife Samara is not sure if he can get off work, Not sure if he should wait until Dr. Boudreaux's next available appointment on 5/01/18, or if he needs to be worked in sooner please call and advise     Phone Number Patient can be reached at: Other phone number:  [phone] ask for Samara or garth Motta    Best Time: any    Can we leave a detailed message on this number? Not Applicable    Call taken on 4/19/2018 at 10:17 AM by Ann Marie Bansal
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
## 2018-04-19 NOTE — MR AVS SNAPSHOT
After Visit Summary   4/19/2018    Kalia Boateng    MRN: [id number]           Patient Information     Date Of Birth          1965        Visit Information        Provider Department      4/19/2018 2:20 PM Cristi Camacho MD [facility]        Today's Diagnoses     Abdominal pain, right upper quadrant    -  1    Umbilical pain           Follow-ups after your visit        Your next 10 appointments already scheduled     Apr 23, 2018  4:00 PM CDT   (Arrive by 3:45 PM)   CT ABDOMEN PELVIS W CONTRAST with PHCT1   [facility] CT Scan ([facility])    [address] [place] [id number] [phone]           Please bring any scans or X-rays taken at other hospitals, if similar tests were done. Also bring a list of your medicines, including vitamins, minerals and over-the-counter drugs. It is safest to leave personal items at home.  Be sure to tell your doctor:   If you have any allergies.   If there s any chance you are pregnant.   If you are breastfeeding.  You may have contrast for this exam. To prepare:   Do not eat or drink for 2 hours before your exam. If you need to take medicine, you may take it with small sips of water. (We may ask you to take liquid medicine as well.)   The day before your exam, drink extra fluids at least six 8-ounce glasses (unless your doctor tells you to restrict your fluids).   You will be given instructions on how to drink the contrast.  Patients over 70 or patients with diabetes or kidney problems:   If you haven t had a blood test (creatinine test) within the last 30 days, the Cardiologist/Radiologist may require you to get this test prior to your exam.  If you have diabetes:   Continue to take your metformin medication on the day of your exam  Please wear loose clothing, such as a sweat suit or jogging clothes. Avoid snaps, zippers and other metal. We may ask you to undress and put on a hospital gown.  If you have any  questions, please call the Imaging Department where you will have your exam.            Aug 01, 2018  3:45 PM CDT   Return Visit with Gin Ferreira MD, MG ENDO NURSE   [facility]    [address] [place] [id number] [phone]            Feb 05, 2019  4:00 PM CST   Return Visit with Malathi Barrera MD   [facility]    [address] [place] [id number] [phone]              Future tests that were ordered for you today     Open Future Orders        Priority Expected Expires Ordered    CT Abdomen Pelvis w Contrast Routine  4/19/2019 4/19/2018    Albumin Random Urine Quantitative with Creat Ratio Routine 7/18/2018 4/19/2019 4/19/2018    Comprehensive metabolic panel Routine 7/18/2018 4/19/2019 4/19/2018    Hematocrit Routine 7/18/2018 4/19/2019 4/19/2018    Lipid panel reflex to direct LDL Fasting Routine 7/18/2018 4/19/2019 4/19/2018    TSH with free T4 reflex Routine 7/18/2018 4/19/2019 4/19/2018            Who to contact     If you have questions or need follow up information about today's clinic visit or your schedule please contact [facility] directly at [phone].  Normal or non-critical lab and imaging results will be communicated to you by [employer], letter or phone within 4 business days after the clinic has received the results. If you do not hear from us within 7 days, please contact the clinic through Disconnectt or phone. If you have a critical or abnormal lab result, we will notify you by phone as soon as possible.  Submit refill requests through [employer] or call your pharmacy and they will forward the refill request to us. Please allow 3 business days for your refill to be completed.          Additional Information About Your Visit        [employer] Information     [employer] gives you secure access to your electronic health record. If you see a  "primary care provider, you can also send messages to your care team and make appointments. If you have questions, please call your primary care clinic.  If you do not have a primary care provider, please call [phone] and they will assist you.        Care EveryWhere ID     This is your Care EveryWhere ID. This could be used by other organizations to access your [place] medical records  [id number]        Your Vitals Were     Pulse Temperature Height Pulse Oximetry BMI (Body Mass Index)       94 97.8  F (36.6  C) (Temporal) 5' 10.15\" (1.782 m) 96% 46.78 kg/m2        Blood Pressure from Last 3 Encounters:   04/19/18 136/68   04/18/18 152/83   02/06/18 150/72    Weight from Last 3 Encounters:   04/19/18 327 lb 6.4 oz (148.5 kg)   04/18/18 328 lb 11.3 oz (149.1 kg)   02/06/18 (!) 326 lb (147.9 kg)              We Performed the Following     *UA reflex to Microscopic and Culture ([place]; [facility]; [facility]; [facility]; [facility]; [facility]; [place]; [place])     CBC with platelets differential     Comprehensive metabolic panel (BMP + Alb, Alk Phos, ALT, AST, Total. Bili, TP)          Today's Medication Changes          These changes are accurate as of 4/19/18  3:00 PM.  If you have any questions, ask your nurse or doctor.               These medicines have changed or have updated prescriptions.        Dose/Directions    insulin degludec 200 UNIT/ML pen   Commonly known as:  TRESIBA FLEXTOUCH   This may have changed:  additional instructions   Used for:  Type 2 diabetes mellitus with diabetic autonomic neuropathy, with long-term current use of insulin (H)        Take 28 units daily at bedtime.   Quantity:  30 mL   Refills:  3                Primary Care Provider Office Phone # Fax #    Scottie Boudreaux [phone] [address] [place] [address]        Equal Access to Services     BRANDI RAMÍREZ AH: Imtiaz Perera, juan villa, analiaybroni " cuco gallegosflex santillan ah. So [facility] [phone].    ATENCIÓN: Si kanchan johnston, tiene a barron disposición servicios gratuitos de asistencia lingüística. Terra al [phone].    We comply with applicable federal civil rights laws and [place] laws. We do not discriminate on the basis of race, color, national origin, age, disability, sex, sexual orientation, or gender identity.            Thank you!     Thank you for choosing [facility]  for your care. Our goal is always to provide you with excellent care. Hearing back from our patients is one way we can continue to improve our services. Please take a few minutes to complete the written survey that you may receive in the mail after your visit with us. Thank you!             Your Updated Medication List - Protect others around you: Learn how to safely use, store and throw away your medicines at www.disposemymeds.org.          This list is accurate as of 4/19/18  3:00 PM.  Always use your most recent med list.                   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    Type 2 diabetes, uncontrolled, with neuropathy (H)       blood glucose monitoring test strip    ONETOUCH VERIO IQ    350 strip     Use to test blood sugars 3-4 times daily or as directed.    Type 2 diabetes, uncontrolled, with neuropathy (H)       chlorthalidone 25 MG tablet    HYGROTON    90 tablet    TAKE ONE TABLET BY MOUTH ONCE DAILY    Essential hypertension with goal blood pressure less than 140/90       colchicine 0.6 MG tablet      Take  by mouth as needed.        doxazosin 4 MG tablet    CARDURA    90 tablet    TAKE ONE TABLET BY MOUTH AT BEDTIME    Uncontrolled hypertension       glipiZIDE 10 MG 24 hr tablet    GLUCOTROL XL    180 tablet    TAKE 2 TABLETS BY MOUTH EVERY DAY.    Type 2 diabetes mellitus with diabetic neuropathy (H)       insulin degludec 200 UNIT/ML pen    TRESIBA FLEXTOUCH    30 mL    Take 28 units daily at bedtime.    Type 2 diabetes mellitus with diabetic autonomic neuropathy, with long-term current use of insulin (H)       insulin pen needle 31G X 8 MM    B-D U/F    200 each    Use twice daily or as directed.    Type 2 diabetes, uncontrolled, with neuropathy (H)       lisinopril 20 MG tablet    PRINIVIL/ZESTRIL    180 tablet    Take 1 tablet (20 mg) by mouth 2 times daily        metFORMIN 500 MG 24 hr tablet    GLUCOPHAGE-XR    360 tablet    TAKE 4 TABLETS BY MOUTH WITH DINNER.    Type 2 diabetes mellitus with autonomic neuropathy (H)       NovoLOG FLEXPEN 100 UNIT/ML injection   Generic drug:  insulin aspart     36 mL    Administer 1 U per 2 grams carbohydrates prior to dinner. Total daily dose ~ 40 U.    Type 2 diabetes mellitus with diabetic autonomic neuropathy, with long-term current use of insulin (H)       order for DME     1 Bottle    Equipment being ordered: CONTROL SOLUTION for checking BS.    Type 2 diabetes, HbA1C goal < 8% (H)       order for DME     1 Units    Resmed Aircurve 10 auto bilevel 17/11 cm, Mirage Fx Wide nasal mask w/chin strap.    CYRIL (obstructive sleep apnea)       oxyCODONE-acetaminophen 5-325 MG per tablet    PERCOCET    24 tablet    Take 1-2 tablets by mouth every 4 hours as needed  for pain (moderate to severe)    Disc disorder of cervical region, Primary osteoarthritis, unspecified site       spironolactone 25 MG tablet    ALDACTONE    180 tablet    Take 1 tablet (25 mg) by mouth 2 times daily        TYLENOL PO      Take 1,300 mg by mouth 3 times daily as needed

## 2018-04-19 NOTE — NURSING NOTE
"Chief Complaint   Patient presents with     Back Pain     low back pain        Initial /68 (BP Location: Left arm, Patient Position: Chair, Cuff Size: Adult Large)  Pulse 94  Temp 97.8  F (36.6  C) (Temporal)  Ht 5' 10.15\" (1.782 m)  Wt 327 lb 6.4 oz (148.5 kg)  SpO2 96%  BMI 46.78 kg/m2 Estimated body mass index is 46.78 kg/(m^2) as calculated from the following:    Height as of this encounter: 5' 10.15\" (1.782 m).    Weight as of this encounter: 327 lb 6.4 oz (148.5 kg).  Medication Reconciliation: complete     "

## 2018-04-23 ENCOUNTER — HOSPITAL ENCOUNTER (OUTPATIENT)
Dept: CT IMAGING | Facility: CLINIC | Age: 53
Discharge: HOME | End: 2018-04-23
Attending: FAMILY MEDICINE | Admitting: FAMILY MEDICINE
Payer: COMMERCIAL

## 2018-04-23 DIAGNOSIS — R10.11 ABDOMINAL PAIN, RIGHT UPPER QUADRANT: ICD-10-CM

## 2018-04-23 DIAGNOSIS — R10.33 UMBILICAL PAIN: ICD-10-CM

## 2018-04-23 PROCEDURE — 25000125 ZZHC RX 250: Performed by: RADIOLOGY

## 2018-04-23 PROCEDURE — 74177 CT ABD & PELVIS W/CONTRAST: CPT

## 2018-04-23 PROCEDURE — 25000128 H RX IP 250 OP 636: Performed by: RADIOLOGY

## 2018-04-23 RX ORDER — IOPAMIDOL 755 MG/ML
100 INJECTION, SOLUTION INTRAVASCULAR ONCE
Status: COMPLETED | OUTPATIENT
Start: 2018-04-23 | End: 2018-04-23

## 2018-04-23 RX ADMIN — SODIUM CHLORIDE 60 ML: 9 INJECTION, SOLUTION INTRAVENOUS at 15:59

## 2018-04-23 RX ADMIN — IOPAMIDOL 100 ML: 755 INJECTION, SOLUTION INTRAVENOUS at 15:58

## 2018-04-24 ENCOUNTER — TELEPHONE (OUTPATIENT)
Dept: FAMILY MEDICINE | Facility: CLINIC | Age: 53
End: 2018-04-24

## 2018-04-24 DIAGNOSIS — R31.9 BLOOD IN URINE: Primary | ICD-10-CM

## 2018-04-24 NOTE — PROGRESS NOTES
CT scan of the abdomen showed nothing that would be causing discomfort.  They saw some stable small cysts in the kidneys that have been there before.  This continues on a general surgery consult could be considered.  Your labs were all okay except for small amount of blood in the urine and this you have had before.  Also your blood sugar was 154.  I would recommend repeat a urinalysis in a month and if it still contains some blood cells he might have him see urology.

## 2018-04-24 NOTE — LETTER
April 25, 2018      Kalia Boateng  [address] [address] [place] [id number]        Dear ,    We are writing to inform you of your test results. Unable to reach you by phone.     CT scan of the abdomen showed nothing that would be causing discomfort.  They saw some stable small cysts in the kidneys that have been there before.  This continues on a general surgery consult could be considered.  Your labs were all okay except for small amount of blood in the urine and this you have had before.  Also your blood sugar was 154.  I would recommend repeat a urinalysis in a month and if it still contains some blood cells he might have him see urology.    Please contact the clinic if you want the consult referrals.     Resulted Orders   CBC with platelets differential   Result Value Ref Range    WBC 6.0 4.0 - 11.0 10e9/L    RBC Count 4.40 4.4 - 5.9 10e12/L    Hemoglobin 13.5 13.3 - 17.7 g/dL    Hematocrit 41.0 40.0 - 53.0 %    MCV 93 78 - 100 fl    MCH 30.7 26.5 - 33.0 pg    MCHC 32.9 31.5 - 36.5 g/dL    RDW 14.5 10.0 - 15.0 %    Platelet Count 164 150 - 450 10e9/L    Diff Method Automated Method     % Neutrophils 65.5 %    % Lymphocytes 19.4 %    % Monocytes 7.3 %    % Eosinophils 7.3 %    % Basophils 0.3 %    % Immature Granulocytes 0.2 %    Absolute Neutrophil 4.0 1.6 - 8.3 10e9/L    Absolute Lymphocytes 1.2 0.8 - 5.3 10e9/L    Absolute Monocytes 0.4 0.0 - 1.3 10e9/L    Absolute Eosinophils 0.4 0.0 - 0.7 10e9/L    Absolute Basophils 0.0 0.0 - 0.2 10e9/L    Abs Immature Granulocytes 0.0 0 - 0.4 10e9/L   *UA reflex to Microscopic and Culture ([place]; [facility]; [facility]; [facility]; [facility]; [facility]; [place]; [place])   Result Value Ref Range    Color Urine Yellow     Appearance Urine Clear     Glucose Urine Negative NEG^Negative mg/dL    Bilirubin Urine Negative NEG^Negative    Ketones Urine Negative NEG^Negative mg/dL    Specific Gravity Urine 1.030 1.003 - 1.035    Blood Urine Small  (A) NEG^Negative    pH Urine 5.5 5.0 - 7.0 pH    Protein Albumin Urine 100 (A) NEG^Negative mg/dL    Urobilinogen mg/dL 0.2 0.0 - 2.0 mg/dL    Nitrite Urine Negative NEG^Negative    Leukocyte Esterase Urine Negative NEG^Negative    Source Unspecified Urine    Comprehensive metabolic panel (BMP + Alb, Alk Phos, ALT, AST, Total. Bili, TP)   Result Value Ref Range    Sodium 139 133 - 144 mmol/L    Potassium 3.9 3.4 - 5.3 mmol/L    Chloride 104 94 - 109 mmol/L    Carbon Dioxide 29 20 - 32 mmol/L    Anion Gap 6 3 - 14 mmol/L    Glucose 154 (H) 70 - 99 mg/dL    Urea Nitrogen 33 (H) 7 - 30 mg/dL    Creatinine 0.95 0.66 - 1.25 mg/dL    GFR Estimate 82 >60 mL/min/1.7m2      Comment:      Non  GFR Calc    GFR Estimate If Black >90 >60 mL/min/1.7m2      Comment:       GFR Calc    Calcium 9.3 8.5 - 10.1 mg/dL    Bilirubin Total 0.3 0.2 - 1.3 mg/dL    Albumin 3.8 3.4 - 5.0 g/dL    Protein Total 7.6 6.8 - 8.8 g/dL    Alkaline Phosphatase 117 40 - 150 U/L    ALT 54 0 - 70 U/L    AST 41 0 - 45 U/L   Urine Microscopic   Result Value Ref Range    WBC Urine 0 TO 2 0 - 5 /HPF    RBC Urine 2 TO 5 0 - 2 /HPF       If you have any questions or concerns, please call the clinic at the number listed above.       Sincerely,        Cristi Camacho MD

## 2018-04-24 NOTE — TELEPHONE ENCOUNTER
----- Message from Cristi Camacho MD sent at 4/24/2018 11:23 AM CDT -----  CT scan of the abdomen showed nothing that would be causing discomfort.  They saw some stable small cysts in the kidneys that have been there before.  This continues on a general surgery consult could be considered.  Your labs were all okay except for small amount of blood in the urine and this you have had before.  Also your blood sugar was 154.  I would recommend repeat a urinalysis in a month and if it still contains some blood cells he might have him see urology.

## 2018-04-25 NOTE — TELEPHONE ENCOUNTER
Kalia returns call and message is relayed.  Kalia states understanding and had no other questions or concerns.

## 2018-05-31 DIAGNOSIS — I10 UNCONTROLLED HYPERTENSION: ICD-10-CM

## 2018-05-31 RX ORDER — DOXAZOSIN 4 MG/1
TABLET ORAL
Qty: 90 TABLET | Refills: 3 | Status: SHIPPED | OUTPATIENT
Start: 2018-05-31 | End: 2018-12-31

## 2018-06-20 DIAGNOSIS — I10 ESSENTIAL HYPERTENSION: ICD-10-CM

## 2018-06-20 RX ORDER — LISINOPRIL 40 MG/1
TABLET ORAL
Qty: 90 TABLET | Refills: 3 | Status: SHIPPED | OUTPATIENT
Start: 2018-06-20 | End: 2018-12-31

## 2018-08-01 ENCOUNTER — OFFICE VISIT (OUTPATIENT)
Dept: ENDOCRINOLOGY | Facility: CLINIC | Age: 53
End: 2018-08-01
Payer: COMMERCIAL

## 2018-08-01 VITALS
BODY MASS INDEX: 44.1 KG/M2 | DIASTOLIC BLOOD PRESSURE: 76 MMHG | HEART RATE: 55 BPM | SYSTOLIC BLOOD PRESSURE: 176 MMHG | WEIGHT: 315 LBS | OXYGEN SATURATION: 96 % | HEIGHT: 71 IN

## 2018-08-01 DIAGNOSIS — Z79.4 TYPE 2 DIABETES MELLITUS WITH DIABETIC NEPHROPATHY, WITH LONG-TERM CURRENT USE OF INSULIN (H): Primary | ICD-10-CM

## 2018-08-01 DIAGNOSIS — I10 UNCONTROLLED HYPERTENSION: ICD-10-CM

## 2018-08-01 DIAGNOSIS — E11.21 TYPE 2 DIABETES MELLITUS WITH DIABETIC NEPHROPATHY, WITH LONG-TERM CURRENT USE OF INSULIN (H): Primary | ICD-10-CM

## 2018-08-01 DIAGNOSIS — E66.01 MORBID OBESITY (H): ICD-10-CM

## 2018-08-01 LAB
ALBUMIN SERPL-MCNC: 3.6 G/DL (ref 3.4–5)
ALP SERPL-CCNC: 151 U/L (ref 40–150)
ALT SERPL W P-5'-P-CCNC: 65 U/L (ref 0–70)
ANION GAP SERPL CALCULATED.3IONS-SCNC: 7 MMOL/L (ref 3–14)
AST SERPL W P-5'-P-CCNC: 44 U/L (ref 0–45)
BILIRUB SERPL-MCNC: 0.2 MG/DL (ref 0.2–1.3)
BUN SERPL-MCNC: 29 MG/DL (ref 7–30)
CALCIUM SERPL-MCNC: 8.9 MG/DL (ref 8.5–10.1)
CHLORIDE SERPL-SCNC: 102 MMOL/L (ref 94–109)
CO2 SERPL-SCNC: 28 MMOL/L (ref 20–32)
CREAT SERPL-MCNC: 1.08 MG/DL (ref 0.66–1.25)
CREAT UR-MCNC: 55 MG/DL
GFR SERPL CREATININE-BSD FRML MDRD: 71 ML/MIN/1.7M2
GLUCOSE SERPL-MCNC: 312 MG/DL (ref 70–99)
HBA1C MFR BLD: 8.7 % (ref 0–5.7)
HCT VFR BLD AUTO: 38.6 % (ref 40–53)
MICROALBUMIN UR-MCNC: 602 MG/L
MICROALBUMIN/CREAT UR: 1090.58 MG/G CR (ref 0–17)
POTASSIUM SERPL-SCNC: 4 MMOL/L (ref 3.4–5.3)
PROT SERPL-MCNC: 7.7 G/DL (ref 6.8–8.8)
SODIUM SERPL-SCNC: 137 MMOL/L (ref 133–144)
TSH SERPL DL<=0.005 MIU/L-ACNC: 0.72 MU/L (ref 0.4–4)

## 2018-08-01 PROCEDURE — 82043 UR ALBUMIN QUANTITATIVE: CPT | Performed by: INTERNAL MEDICINE

## 2018-08-01 PROCEDURE — 85014 HEMATOCRIT: CPT | Performed by: INTERNAL MEDICINE

## 2018-08-01 PROCEDURE — 99214 OFFICE O/P EST MOD 30 MIN: CPT | Performed by: INTERNAL MEDICINE

## 2018-08-01 PROCEDURE — 83036 HEMOGLOBIN GLYCOSYLATED A1C: CPT | Performed by: INTERNAL MEDICINE

## 2018-08-01 PROCEDURE — 84443 ASSAY THYROID STIM HORMONE: CPT | Performed by: INTERNAL MEDICINE

## 2018-08-01 PROCEDURE — 80053 COMPREHEN METABOLIC PANEL: CPT | Performed by: INTERNAL MEDICINE

## 2018-08-01 PROCEDURE — 36415 COLL VENOUS BLD VENIPUNCTURE: CPT | Performed by: INTERNAL MEDICINE

## 2018-08-01 NOTE — MR AVS SNAPSHOT
After Visit Summary   8/1/2018    Kalia Boateng    MRN: [id number]           Patient Information     Date Of Birth          1965        Visit Information        Provider Department      8/1/2018 3:45 PM Gin Ferreira MD; MG ENDO NURSE [facility]        Today's Diagnoses     Type 2 diabetes mellitus with diabetic nephropathy, with long-term current use of insulin (H)    -  1    Type 2 diabetes mellitus with diabetic autonomic neuropathy, with long-term current use of insulin (H)        Type 2 diabetes, uncontrolled, with neuropathy (H)        Morbid obesity (H)          Care Instructions    Increase the dose of Tresiba to 50 U   If the morning BG is persistently higher than the bedtime BG, you can further increase the dose by 3 U every week.           Follow-ups after your visit        Additional Services     NUTRITION REFERRAL       Your provider has referred you to: FMG: [facility], [phone]  [email or web address]   [facility]: [facility] [phone]   [email or web address]    Please be aware that coverage of these services is subject to the terms and limitations of your health insurance plan.  Call member services at your health plan with any benefit or coverage questions.      Please bring the following to your appointment:      >>   This referral request   >>   Any documents given to you for this referral  >>   Any specific questions you have about diet or food choices                  Follow-up notes from your care team     Return in about 3 months (around 11/1/2018) for labs today, dietician apt.      Your next 10 appointments already scheduled     Aug 21, 2018  4:30 PM CDT   [facility] Amalia Flynn   [facility]    [id number] [place] [id number] [phone]            Nov 28,  2018  3:45 PM CST   Return Visit with Gin Ferreira MD, MG ENDO NURSE   [facility] ([facility])    [address] [place] [id number] [phone]              Future tests that were ordered for you today     Open Standing Orders        Priority Remaining Interval Expires Ordered    Hemoglobin A1c POCT Routine 3/4 Q 3 MO 8/1/2019 8/1/2018          Open Future Orders        Priority Expected Expires Ordered    Basic metabolic panel Routine 8/1/2018 8/1/2019 8/1/2018            Who to contact     If you have questions or need follow up information about today's clinic visit or your schedule please contact [facility] directly at [phone].  Normal or non-critical lab and imaging results will be communicated to you by [email or web address], letter or phone within 4 business days after the clinic has received the results. If you do not hear from us within 7 days, please contact the clinic through [email or web address] or phone. If you have a critical or abnormal lab result, we will notify you by phone as soon as possible.  Submit refill requests through [employer] or call your pharmacy and they will forward the refill request to us. Please allow 3 business days for your refill to be completed.          Additional Information About Your Visit        [employer] Information     [employer] gives you secure access to your electronic health record. If you see a primary care provider, you can also send messages to your care team and make appointments. If you have questions, please call your primary care clinic.  If you do not have a primary care provider, please call [phone] and they will assist you.      [employer] is an electronic gateway that provides easy, online access to your medical records. With [employer], you can request a clinic appointment, read your test results, renew a prescription or communicate with your care team.     To access your existing account, please contact  "your [facility] or call [phone] for assistance.        Care EveryWhere ID     This is your Care EveryWhere ID. This could be used by other organizations to access your [facility] medical records  [id number]        Your Vitals Were     Pulse Height Pulse Oximetry BMI (Body Mass Index)          55 1.791 m (5' 10.5\") 96% 46.68 kg/m2         Blood Pressure from Last 3 Encounters:   08/01/18 176/76   04/19/18 136/68   04/18/18 152/83    Weight from Last 3 Encounters:   08/01/18 149.7 kg (330 lb)   04/19/18 148.5 kg (327 lb 6.4 oz)   04/18/18 149.1 kg (328 lb 11.3 oz)              We Performed the Following     Hemoglobin A1c POCT     NUTRITION REFERRAL          Today's Medication Changes          These changes are accurate as of 8/1/18  4:42 PM.  If you have any questions, ask your nurse or doctor.               Start taking these medicines.        Dose/Directions    dulaglutide 0.75 MG/0.5ML pen   Commonly known as:  TRULICITY   Used for:  Type 2 diabetes mellitus with diabetic nephropathy, with long-term current use of insulin (H)   Started by:  Gin Ferreira MD        Dose:  0.75 mg   Inject 0.75 mg Subcutaneous every 7 days   Quantity:  6 mL   Refills:  3       insulin degludec 200 UNIT/ML pen   Commonly known as:  TRESIBA FLEXTOUCH   Used for:  Type 2 diabetes mellitus with diabetic autonomic neuropathy, with long-term current use of insulin (H)   Started by:  Gin Ferreira MD        Dose:  50 Units   Inject 50 Units Subcutaneous At Bedtime   Quantity:  25 mL   Refills:  3         These medicines have changed or have updated prescriptions.        Dose/Directions    insulin pen needle 31G X 8 MM   Commonly known as:  B-D U/F   This may have changed:  additional instructions   Used for:  Type 2 diabetes, uncontrolled, with neuropathy (H)   Changed by:  Gin Ferreira MD        Use 3 times daily   Quantity:  300 each   Refills:  3            Where " to get your medicines      These medications were sent to [facility] - [place], [place] - [address]  [address], [place] [address]     Phone:  [phone]     dulaglutide 0.75 MG/0.5ML pen    insulin degludec 200 UNIT/ML pen    insulin pen needle 31G X 8 MM                Primary Care Provider Office Phone # Fax #    Scottie Florencio Boudreaux [phone]       [address] [facility]   [facility] [place] [address]        Equal Access to Services     [facility] : Hadii aad ku hadasho Soomaali, waaxda luqadaha, qaybta kaalmada adeegyada, waxay idiin hayaan adeeg kharash lamargaret . So [facility] [phone].    ATENCIÓN: Si habla español, tiene a barron disposición servicios gratuitos de asistencia lingüística. [facility] [phone].    We comply with applicable federal civil rights laws and [place] laws. We do not discriminate on the basis of race, color, national origin, age, disability, sex, sexual orientation, or gender identity.            Thank you!     Thank you for choosing [facility]  for your care. Our goal is always to provide you with excellent care. Hearing back from our patients is one way we can continue to improve our services. Please take a few minutes to complete the written survey that you may receive in the mail after your visit with us. Thank you!             Your Updated Medication List - Protect others around you: Learn how to safely use, store and throw away your medicines at www.disposemymeds.org.          This list is accurate as of 8/1/18  4:42 PM.  Always use your most recent med list.                   Brand Name Dispense Instructions for use Diagnosis    allopurinol 300 MG tablet    ZYLOPRIM    90 tablet    Take 1 tablet (300 mg) by mouth daily    Idiopathic chronic gout of multiple sites without tophus       amLODIPine 5 MG tablet    NORVASC    90 tablet    Take 1 tablet (5 mg) by mouth daily    Benign essential hypertension       aspirin 81 MG EC tablet     90 tablet     Take 1 tablet (81 mg) by mouth daily    Coronary artery disease involving native coronary artery of native heart without angina pectoris       atorvastatin 40 MG tablet    LIPITOR    90 tablet    TAKE ONE TABLET BY MOUTH ONCE DAILY    Type 2 diabetes, uncontrolled, with neuropathy (H)       blood glucose monitoring lancets     1 Box    Use to test blood sugars 3-4 times daily or as directed.    Type 2 diabetes, uncontrolled, with neuropathy (H)       blood glucose monitoring test strip    ONETOUCH VERIO IQ    350 strip    Use to test blood sugars 3-4 times daily or as directed.    Type 2 diabetes, uncontrolled, with neuropathy (H)       chlorthalidone 25 MG tablet    HYGROTON    90 tablet    TAKE ONE TABLET BY MOUTH ONCE DAILY    Essential hypertension with goal blood pressure less than 140/90       colchicine 0.6 MG tablet    COLCYRS     Take  by mouth as needed.        doxazosin 4 MG tablet    CARDURA    90 tablet    TAKE ONE TABLET BY MOUTH AT BEDTIME    Uncontrolled hypertension       dulaglutide 0.75 MG/0.5ML pen    TRULICITY    6 mL    Inject 0.75 mg Subcutaneous every 7 days    Type 2 diabetes mellitus with diabetic nephropathy, with long-term current use of insulin (H)       glipiZIDE 10 MG 24 hr tablet    GLUCOTROL XL    180 tablet    TAKE 2 TABLETS BY MOUTH EVERY DAY.    Type 2 diabetes mellitus with diabetic neuropathy (H)       insulin degludec 200 UNIT/ML pen    TRESIBA FLEXTOUCH    25 mL    Inject 50 Units Subcutaneous At Bedtime    Type 2 diabetes mellitus with diabetic autonomic neuropathy, with long-term current use of insulin (H)       insulin pen needle 31G X 8 MM    B-D U/F    300 each    Use 3 times daily    Type 2 diabetes, uncontrolled, with neuropathy (H)       lisinopril 40 MG tablet    PRINIVIL/ZESTRIL    90 tablet    TAKE ONE-HALF TABLET (20MG) BY MOUTH TWICE A DAY    Essential hypertension       metFORMIN 500 MG 24 hr tablet    GLUCOPHAGE-XR    360 tablet    TAKE 4 TABLETS BY MOUTH WITH  DINNER.    Type 2 diabetes mellitus with autonomic neuropathy (H)       NovoLOG FLEXPEN 100 UNIT/ML injection   Generic drug:  insulin aspart     36 mL    Administer 1 U per 2 grams carbohydrates prior to dinner. Total daily dose ~ 40 U.    Type 2 diabetes mellitus with diabetic autonomic neuropathy, with long-term current use of insulin (H)       order for DME     1 Bottle    Equipment being ordered: CONTROL SOLUTION for checking BS.    Type 2 diabetes, HbA1C goal < 8% (H)       order for DME     1 Units    Resmed Aircurve 10 auto bilevel 17/11 cm, [place] Fx Wide nasal mask w/chin strap.    CYRIL (obstructive sleep apnea)       oxyCODONE-acetaminophen 5-325 MG per tablet    PERCOCET    24 tablet    Take 1-2 tablets by mouth every 4 hours as needed for pain (moderate to severe)    Disc disorder of cervical region, Primary osteoarthritis, unspecified site       spironolactone 25 MG tablet    ALDACTONE    180 tablet    Take 1 tablet (25 mg) by mouth 2 times daily        TYLENOL PO      Take 1,300 mg by mouth 3 times daily as needed

## 2018-08-01 NOTE — NURSING NOTE
"Kalia Boateng's goals for this visit include: Follow up Diabetes  He requests these members of his care team be copied on today's visit information: YES    PCP: Scottie Boudreaux    Referring Provider:  No referring provider defined for this encounter.    Ht 1.791 m (5' 10.5\")  Wt 149.7 kg (330 lb)  BMI 46.68 kg/m2    Do you need any medication refills at today's visit? YES    "

## 2018-08-01 NOTE — PATIENT INSTRUCTIONS
Increase the dose of Tresiba to 50 U   If the morning BG is persistently higher than the bedtime BG, you can further increase the dose by 3 U every week.
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
## 2018-08-02 DIAGNOSIS — I10 ESSENTIAL HYPERTENSION: Primary | ICD-10-CM

## 2018-08-02 RX ORDER — SPIRONOLACTONE 50 MG/1
50 TABLET, FILM COATED ORAL 2 TIMES DAILY
Qty: 180 TABLET | Refills: 3 | Status: SHIPPED | OUTPATIENT
Start: 2018-08-02 | End: 2019-03-11

## 2018-08-02 NOTE — PROGRESS NOTES
The labs reveal persistent proteins in the urine. The kidney function is normal. As discussed, you can increase the dose of spironolactone to 50 mg, twice daily. Please try to maintain a good hydration (recommended daily water intake is at least 2 liters). After being on this higher dose of spironolactone for 1-2 weeks, please have labwork done (to make sure the potassium level and the kidney function remain stable). Meantime, please check BP at home, after sitting for 15 minutes, and contact us with the numbers.

## 2018-08-29 DIAGNOSIS — E11.40 TYPE 2 DIABETES MELLITUS WITH DIABETIC NEUROPATHY (H): ICD-10-CM

## 2018-08-29 RX ORDER — BLOOD SUGAR DIAGNOSTIC
STRIP MISCELLANEOUS
Qty: 350 EACH | Refills: 3 | Status: SHIPPED | OUTPATIENT
Start: 2018-08-29 | End: 2019-07-17

## 2018-08-29 RX ORDER — GLIPIZIDE 10 MG/1
TABLET, FILM COATED, EXTENDED RELEASE ORAL
Qty: 180 TABLET | Refills: 3 | Status: SHIPPED | OUTPATIENT
Start: 2018-08-29 | End: 2019-06-24

## 2018-11-06 ENCOUNTER — TELEPHONE (OUTPATIENT)
Dept: FAMILY MEDICINE | Facility: CLINIC | Age: 53
End: 2018-11-06

## 2018-11-06 DIAGNOSIS — M1A.09X0 IDIOPATHIC CHRONIC GOUT OF MULTIPLE SITES WITHOUT TOPHUS: ICD-10-CM

## 2018-11-06 NOTE — LETTER
November 9, 2018      Kalia Boateng  [address] [address] [place] [phone]        Dear Kalia,     We have been trying to reach you, you are due to seen to recheck your meds. Please call and make an appt.       Sincerely,        Scottie Boudreaux MD

## 2018-11-08 RX ORDER — ALLOPURINOL 300 MG/1
TABLET ORAL
Qty: 30 TABLET | Refills: 0 | Status: SHIPPED | OUTPATIENT
Start: 2018-11-08 | End: 2018-12-10

## 2018-11-08 NOTE — TELEPHONE ENCOUNTER
"Laya refill given per RN protocol.   Please contact patient to have them schedule the following: annual exam- due after 11/3/2018    Lupe Goel RN, BSN      Allopurinol  Last Written Prescription Date:  11/3/2017  Last Fill Quantity: 90,  # refills: 3   Last office visit: 4/19/2018 with prescribing provider: 11/3/2017   Future Office Visit:   Next 5 appointments (look out 90 days)     Nov 28, 2018  3:45 PM CST   Return Visit with Gin Ferreira MD, MG ENDO NURSE   [facility]    [address] [place] [address] [phone]                   Requested Prescriptions   Pending Prescriptions Disp Refills     allopurinol (ZYLOPRIM) 300 MG tablet [Pharmacy Med Name: ALLOPURINOL 300MG TABS] 90 tablet 3     Sig: TAKE ONE TABLET BY MOUTH ONCE DAILY    Gout Agents Protocol Failed    11/6/2018  5:30 AM       Failed - Has Uric Acid on file in past 12 months and value is less than 6    Recent Labs   Lab Test  11/18/17   0809   URIC  6.2     If level is 6mg/dL or greater, ok to refill one time and refer to provider.          Passed - CBC on file in past 12 months    Recent Labs   Lab Test  08/01/18   1648  04/19/18   1503   WBC   --   6.0   RBC   --   4.40   HGB   --   13.5   HCT  38.6*  41.0   PLT   --   164                Passed - ALT on file in past 12 months    Recent Labs   Lab Test  08/01/18   1648   ALT  65            Passed - Recent (12 mo) or future (30 days) visit within the authorizing provider's specialty    Patient had office visit in the last 12 months or has a visit in the next 30 days with authorizing provider or within the authorizing provider's specialty.  See \"Patient Info\" tab in inbasket, or \"Choose Columns\" in Meds & Orders section of the refill encounter.             Passed - Patient is age 18 or older       Passed - Normal serum creatinine on file in the past 12 months    Recent Labs   Lab Test  08/01/18   1648   CR  1.08      "        Lupe Goel RN on 11/8/2018 at 10:37 AM

## 2018-11-09 NOTE — TELEPHONE ENCOUNTER
2nd attempt to reach pt- left another message. Routing back to team to send letter.   Thank you,  Thao Crooks- Pt Rep.

## 2018-11-16 NOTE — PROGRESS NOTES
[facility] Surgery Consultation    Kalia Boateng MRN# [id number]   Age: 51 year old YOB: 1965     Date of consultation: 1/17/2017    Reason for consult: Infected skin lesion on face       Requesting physician: Scottie Boudreaux                       Chief Complaint:   Facial skin lesion     History is obtained from the patient and review of the chart         History of Present Illness:   This patient is a 51 year old male  with past medical history as noted below, who presents with nodular skin lesion on face irritated from CPAP mask              Past Medical History:     Past Medical History   Diagnosis Date     NONSPECIFIC MEDICAL HISTORY 1980     Jaw fracture     Osteoarthrosis, unspecified whether generalized or localized, lower leg      left knee     Type II or unspecified type diabetes mellitus without mention of complication, not stated as uncontrolled      Gout      Essential hypertension      Other and unspecified hyperlipidemia      Proteinuria      Diabetic eye exam (H) 09/07/11     CYRIL (obstructive sleep apnea) 2011     Closed fracture of shaft of left humerus 7/27/2015      Diagnosis updated by automated process. Provider to review and confirm.     Dysfunction of left rotator cuff 10/9/2015     Closed fracture of shaft of left humerus with routine healing 10/9/2015      Diagnosis updated by automated process. Provider to review and confirm.     AC joint arthropathy 11/6/2015             Past Surgical History:     Past Surgical History   Procedure Laterality Date     Hc vasectomy unilat/bilat w postop semen  1991     Vasectomy     Hc repair rotator cuff,acute  1990's     Rt Rotator Cuff Repair     Hc knee scope, diagnostic       Arthroscopy, Knee     Hc exc hip pelvis les sc 3 cm/>  12/05/2002     1)Arthroscopic partial medial meniscectomy, left knee.  2)Chondroplasty, medial femoral condyle, left knee.  3)Debridement of medial plica, arthroscopic, left knee.     Hc knee  scope,med/lat menisectomy  1/26/2005      Arthroscopic partial medial meniscectomy, right knee.     Joint replacemtn, knee rt/lt  11/11/09     Medial unicompartmental arthroplasty, left knee.     Inject joint sacroiliac Bilateral 6/24/2015     Procedure: INJECT JOINT SACROILIAC;  Surgeon: James Leahy MD;  Location:  OR             Social History:     Social History   Substance Use Topics     Smoking status: Former Smoker -- 0.50 packs/day for 28 years     Types: Cigarettes     Quit date: 01/01/2010     Smokeless tobacco: Former User     Types: Chew     Quit date: 12/31/2009     Alcohol Use: 1.0 oz/week     2 Cans of beer per week             Family History:     Family History   Problem Relation Age of Onset     Lipids Mother      Hypertension Mother      CANCER Paternal Uncle      x3     HEART DISEASE Father              Immunizations:     VACCINE/DOSE   Diptheria   DPT   DTAP   HBIG   Hepatitis A   Hepatitis B   HIB   Influenza   Measles   Meningococcal   MMR   Mumps   Pneumococcal   Polio   Rubella   Small Pox   TDAP   Varicella   Zoster             Allergies:     Allergies   Allergen Reactions     No Known Drug Allergies              Medications:     Current Outpatient Prescriptions   Medication Sig     cephALEXin (KEFLEX) 500 MG capsule Take 1 capsule (500 mg) by mouth 3 times daily     atorvastatin (LIPITOR) 40 MG tablet Take 1 tablet (40 mg) by mouth daily     empagliflozin (JARDIANCE) 25 MG TABS tablet Take 1 tablet (25 mg) by mouth daily     insulin pen needle (B-D U/F) 31G X 8 MM Use once daily or as directed.     chlorthalidone (HYGROTON) 25 MG tablet Take 1 tablet (25 mg) by mouth daily     blood glucose monitoring (ONE TOUCH VERIO IQ) test strip Use to test blood sugars 3-4 times daily or as directed.     blood glucose monitoring (ONE TOUCH DELICA) lancets Use to test blood sugars 3-4 times daily or as directed.     glipiZIDE (GLUCOTROL XL) 10 MG 24 hr tablet TAKE TWO TABLETS BY MOUTH EVERY DAY      "insulin degludec (TRESIBA) 200 UNIT/ML pen Inject 25 Units Subcutaneous daily (Patient taking differently: Inject 28 Units Subcutaneous daily )     hydrALAZINE (APRESOLINE) 50 MG tablet Take 1 tablet (50 mg) by mouth 3 times daily     order for DME Resmed Aircurve 10 auto bilevel 17/11 cm, Mirage Fx Wide nasal mask w/chin strap.     allopurinol (ZYLOPRIM) 300 MG tablet Take 1 tablet (300 mg) by mouth daily     metFORMIN (GLUCOPHAGE-XR) 500 MG 24 hr tablet TAKE 4 TABLETS BY MOUTH WITH DINNER     oxyCODONE (ROXICODONE) 5 MG immediate release tablet Take 1 tablet (5 mg) by mouth At Bedtime     cyclobenzaprine (FLEXERIL) 10 MG tablet Take 1 tablet (10 mg) by mouth 3 times daily as needed for muscle spasms     aspirin 81 MG EC tablet Take 1 tablet (81 mg) by mouth daily     carvedilol (COREG) 25 MG tablet Take 0.5 tablets (12.5 mg) by mouth 2 times daily (with meals)     ACCU-CHEK COMPACT PLUS test strip USE TO TEST FOUR TIMES A DAY OR AS DIRECTED     lisinopril (PRINIVIL,ZESTRIL) 40 MG tablet TAKE ONE TABLET BY MOUTH ONCE DAILY     Acetaminophen (TYLENOL PO) Take 1,500 mg by mouth 2 times daily     ORDER FOR DME Equipment being ordered: CONTROL SOLUTION for checking BS.     Blood Glucose Monitoring Suppl (ACCU-CHEK COMPACT CARE KIT) KIT Use to test blood sugars 4 times daily or as directed.     ACCU-CHEK MULTICLIX LANCETS MISC Use to test blood sugar 4 times daily or as directed.     colchicine 0.6 MG tablet Take  by mouth as needed.     No current facility-administered medications for this visit.             Review of Systems:             Physical Exam:   All vitals have been reviewed  Patient Vitals for the past 24 hrs:   Temp Temp src Height Weight   01/17/17 1609 98.3  F (36.8  C) Skin 5' 10\" (1.778 m) (!) 322 lb (146.058 kg)         General: Alert and oriented in no obvious distress  Eyes: No scleral icterus  Skin: Warm and dry without diaphoresis or jaundice.  2 cm raised nodular lesion on right malar area with " surrounding erythema .  Irritated from CPAP mask.  It is draining bloody fluid.  Twice its regular size.  Slight surround erythema noted.  Unclear if this is from mask or early cellulitis  Respiratory: Unlabored  Abdomen: Obese, soft   Musculoskeletal: Patient noted to move all extremities normally  Psychiatric: No obvious changes in mood or affect               Data:   All laboratory data reviewed  Results for orders placed or performed in visit on 01/15/17   Creatinine timed urine   Result Value Ref Range    Creatinine Urine 64 mg/dL    Creatinine Urine Timed 1.27 1.00 - 2.00    Volume in mL 2975 mL    Duration in hours 24.0 h   Aldosterone   Result Value Ref Range    Aldosterone 15.7    Potassium   Result Value Ref Range    Potassium 3.5 3.4 - 5.3 mmol/L     There is no pertinent imaging to review.               Assessment and Plan:   Assessment:   Infected skin lesion on face  Patient Active Problem List    Diagnosis Date Noted     Strain of neck muscle, subsequent encounter 08/24/2016     Priority: Medium     Thrombocytopenia (H) 07/26/2016     Priority: Medium     Sleep-related hypoventilation 07/14/2016     Priority: Medium     CYRIL (obstructive sleep apnea) Severe 07/12/2016     Priority: Medium     Morbid obesity, unspecified obesity type (H) 08/06/2004     Priority: Medium     CAD (coronary artery disease) 08/16/2016 2010 coronary CT angiogram, which was a technically difficult study but was concerning for obstructive CAD.  Subsequent coronary angiogram showed moderate narrowing of diagonal branch and very terminal branch of circumflex vessel.         Disc disorder of cervical region 03/20/2015     Work Comp injury       Advanced care planning/counseling discussion 04/21/2014     Forms given to patient on 4/21/14.  Tamiko River CMA         Essential hypertension with goal blood pressure less than 140/90 05/25/2011     Osteoarthritis 07/10/2008     Idiopathic chronic gout of multiple sites without  luis f 01/30/2006     Problem list name updated by automated process. Provider to review       Proteinuria 09/24/2004           Plan:   {I have recommended excisional biopsy of right facial nodular lesion in clinic in [place] with electrocautery available.  Patient is agreeable.  Will start Keflex in meantime to try to shrink lesion.         Attestation:  I have reviewed today's vital signs, notes, medications, labs and imaging.      Haritha Newby MD          c/w HD MWF per renal   anemia due to chronic kidney disease - cont SHAY per renal c/w HD MWF per renal   anemia due to chronic kidney disease - cont SHAY per renal  - have d/w family if symptoms become intolerable, they are aware of option of stopping HD

## 2018-11-26 RX ORDER — PEN NEEDLE, DIABETIC 31 GX5/16"
NEEDLE, DISPOSABLE MISCELLANEOUS
Qty: 200 EACH | Refills: 1 | Status: SHIPPED | OUTPATIENT
Start: 2018-11-26 | End: 2019-07-02

## 2018-11-28 ENCOUNTER — OFFICE VISIT (OUTPATIENT)
Dept: ENDOCRINOLOGY | Facility: CLINIC | Age: 53
End: 2018-11-28
Payer: COMMERCIAL

## 2018-11-28 VITALS
WEIGHT: 315 LBS | BODY MASS INDEX: 45.41 KG/M2 | HEART RATE: 56 BPM | SYSTOLIC BLOOD PRESSURE: 141 MMHG | OXYGEN SATURATION: 96 % | DIASTOLIC BLOOD PRESSURE: 76 MMHG

## 2018-11-28 DIAGNOSIS — E11.21 TYPE 2 DIABETES MELLITUS WITH DIABETIC NEPHROPATHY, WITH LONG-TERM CURRENT USE OF INSULIN (H): ICD-10-CM

## 2018-11-28 DIAGNOSIS — E04.2 MULTIPLE THYROID NODULES: ICD-10-CM

## 2018-11-28 DIAGNOSIS — I10 BENIGN ESSENTIAL HYPERTENSION: ICD-10-CM

## 2018-11-28 DIAGNOSIS — R74.8 HIGH ALKALINE PHOSPHATASE: Primary | ICD-10-CM

## 2018-11-28 DIAGNOSIS — Z79.4 TYPE 2 DIABETES MELLITUS WITH DIABETIC NEPHROPATHY, WITH LONG-TERM CURRENT USE OF INSULIN (H): ICD-10-CM

## 2018-11-28 LAB — HBA1C MFR BLD: 7.8 % (ref 0–5.6)

## 2018-11-28 PROCEDURE — 36415 COLL VENOUS BLD VENIPUNCTURE: CPT | Performed by: INTERNAL MEDICINE

## 2018-11-28 PROCEDURE — 99214 OFFICE O/P EST MOD 30 MIN: CPT | Performed by: INTERNAL MEDICINE

## 2018-11-28 PROCEDURE — 83036 HEMOGLOBIN GLYCOSYLATED A1C: CPT | Performed by: INTERNAL MEDICINE

## 2018-11-28 RX ORDER — AMLODIPINE BESYLATE 5 MG/1
5 TABLET ORAL DAILY
Qty: 90 TABLET | Refills: 3 | Status: SHIPPED | OUTPATIENT
Start: 2018-11-28 | End: 2019-05-28

## 2018-11-28 NOTE — MR AVS SNAPSHOT
After Visit Summary   11/28/2018    Kalia Boateng    MRN: [id number]           Patient Information     Date Of Birth          1965        Visit Information        Provider Department      11/28/2018 3:45 PM Gin Ferreira MD; MG ENDO NURSE [facility]        Today's Diagnoses     High alkaline phosphatase    -  1    Type 2 diabetes mellitus with diabetic nephropathy, with long-term current use of insulin (H)        Benign essential hypertension          Care Instructions    Increase the dose of Novolog for dinner to 30 U. Aim for the bedtime BG <140. If the bedtime BG is higher, you can use a correction of 1 U per 25 points above 140:  140 - 175 - 1 U   176 - 200 - 2 U   201 - 225 - 3 U   226 - 250 - 4 U   251 - 275 - 5 U   276 - 300 - 6 U   ...  Maintain the dose of Tresiba at 50 U   Check BG at bedtime and before breakfast   If the breakfast BG is higher than the bedtime BG, increase the dose of Tresiba by 3 U every 3 days, until morning BG <130     Preventive Care:    Diabetic Eye Exam Screening: During our visit today, we discussed that it is recommended you receive diabetic eye exam screening. Please call or make an appointment with your primary care provider to discuss this with them. You may also call the [facility] scheduling line ([phone]) to set up an eye exam at one of the [facility] Eye Clinics.              Follow-ups after your visit        Follow-up notes from your care team     Return in about 3 months (around 2/28/2019).      Your next 10 appointments already scheduled     Dec 10, 2018  1:30 PM CST   Office Visit with Scottie [facility]    [address] [place] [id number] [phone]           Bring a current list of meds and any records pertaining to this visit. For Physicals, please bring immunization records and any forms needing to be filled out. Please arrive 10 minutes  early to complete paperwork.            Feb 27, 2019  3:45 PM CST   Return Visit with Gin Ferreira MD, MG [facility]O NURSE   [facility]    [address] [address] [place] [id number] [phone]              Future tests that were ordered for you today     Open Future Orders        Priority Expected Expires Ordered    Basic metabolic panel Routine 12/12/2018 11/28/2019 11/28/2018    25 Hydroxyvitamin D2 and D3 Routine 12/12/2018 11/28/2019 11/28/2018            Who to contact     If you have questions or need follow up information about today's clinic visit or your schedule please contact [facility] directly at [phone].  Normal or non-critical lab and imaging results will be communicated to you by [facility], letter or phone within 4 business days after the clinic has received the results. If you do not hear from us within 7 days, please contact the clinic through [facility] [employer] [facility] or phone. If you have a critical or abnormal lab result, we will notify you by phone as soon as possible.  Submit refill requests through [employer] or call your pharmacy and they will forward the refill request to us. Please allow 3 business days for your refill to be completed.          Additional Information About Your Visit        [employer] Information     [employer] gives you secure access to your electronic health record. If you see a primary care provider, you can also send messages to your care team and make appointments. If you have questions, please call your primary care clinic.  If you do not have a primary care provider, please call [phone] and they will assist you.      [employer] is an electronic gateway that provides easy, online access to your medical records. With [employer], you can request a clinic appointment, read your test results, renew a prescription or communicate with your care team.     To access your existing account, please contact your [facility]  [facility] or call [phone] for assistance.        Care EveryWhere ID     This is your Care EveryWhere ID. This could be used by other organizations to access your [place] medical records  [id number]        Your Vitals Were     Pulse Pulse Oximetry BMI (Body Mass Index)             56 96% 45.41 kg/m2          Blood Pressure from Last 3 Encounters:   11/28/18 141/76   08/01/18 176/76   04/19/18 136/68    Weight from Last 3 Encounters:   11/28/18 145.6 kg (321 lb)   08/01/18 149.7 kg (330 lb)   04/19/18 148.5 kg (327 lb 6.4 oz)              We Performed the Following     Hemoglobin A1c POCT          Where to get your medicines      These medications were sent to [facility]  [address], [facility] [address]     Phone:  [phone]     amLODIPine 5 MG tablet          Primary Care Provider Office Phone # Fax #    Scottie Boudreaux [phone]       [address] [facility]   [facility] [address]        Equal Access to Services     HARMONY RAMÍREZ : Hadii aad ku hadasho Socristel, waaxda luqadaha, qaybta kaalmada manan, cuco santillan . So [facility] [phone].    ATENCIÓN: Si habla español, tiene a barron disposición servicios gratuitos de asistencia lingüística. [facility] [phone].    We comply with applicable federal civil rights laws and [place] laws. We do not discriminate on the basis of race, color, national origin, age, disability, sex, sexual orientation, or gender identity.            Thank you!     Thank you for choosing [facility]  for your care. Our goal is always to provide you with excellent care. Hearing back from our patients is one way we can continue to improve our services. Please take a few minutes to complete the written survey that you may receive in the mail after your visit with us. Thank you!             Your Updated Medication List - Protect others around you: Learn how to safely  use, store and throw away your medicines at www.disposemymeds.org.          This list is accurate as of 11/28/18  4:50 PM.  Always use your most recent med list.                   Brand Name Dispense Instructions for use Diagnosis    allopurinol 300 MG tablet    ZYLOPRIM    30 tablet    TAKE ONE TABLET BY MOUTH ONCE DAILY    Idiopathic chronic gout of multiple sites without tophus       amLODIPine 5 MG tablet    NORVASC    90 tablet    Take 1 tablet (5 mg) by mouth daily    Benign essential hypertension       aspirin 81 MG EC tablet    ASA    90 tablet    Take 1 tablet (81 mg) by mouth daily    Coronary artery disease involving native coronary artery of native heart without angina pectoris       atorvastatin 40 MG tablet    LIPITOR    90 tablet    TAKE ONE TABLET BY MOUTH ONCE DAILY    Type 2 diabetes, uncontrolled, with neuropathy (H)       blood glucose monitoring lancets     1 Box    Use to test blood sugars 3-4 times daily or as directed.    Type 2 diabetes, uncontrolled, with neuropathy (H)       chlorthalidone 25 MG tablet    HYGROTON    90 tablet    TAKE ONE TABLET BY MOUTH ONCE DAILY    Essential hypertension with goal blood pressure less than 140/90       colchicine 0.6 MG tablet    COLCYRS     Take  by mouth as needed.        doxazosin 4 MG tablet    CARDURA    90 tablet    TAKE ONE TABLET BY MOUTH AT BEDTIME    Uncontrolled hypertension       dulaglutide 0.75 MG/0.5ML pen    TRULICITY    6 mL    Inject 0.75 mg Subcutaneous every 7 days    Type 2 diabetes mellitus with diabetic nephropathy, with long-term current use of insulin (H)       glipiZIDE 10 MG 24 hr tablet    GLUCOTROL XL    180 tablet    TAKE TWO TABLETS BY MOUTH EVERY DAY    Type 2 diabetes mellitus with diabetic neuropathy (H)       insulin degludec 200 UNIT/ML pen    TRESIBA FLEXTOUCH    25 mL    Inject 50 Units Subcutaneous At Bedtime        * insulin pen needle 31G X 8 MM miscellaneous    B-D U/F    300 each    Use 3 times daily    Type 2  diabetes, uncontrolled, with neuropathy (H)       * B-D U/F 31G X 8 MM miscellaneous   Generic drug:  insulin pen needle     200 each    USE TWICE DAILY OR AS DIRECTED    Type 2 diabetes, uncontrolled, with neuropathy (H)       lisinopril 40 MG tablet    PRINIVIL/ZESTRIL    90 tablet    TAKE ONE-HALF TABLET (20MG) BY MOUTH TWICE A DAY    Essential hypertension       metFORMIN 500 MG 24 hr tablet    GLUCOPHAGE-XR    360 tablet    TAKE 4 TABLETS BY MOUTH WITH DINNER.    Type 2 diabetes mellitus with autonomic neuropathy (H)       NovoLOG FLEXPEN 100 UNIT/ML pen   Generic drug:  insulin aspart     36 mL    Administer 1 U per 2 grams carbohydrates prior to dinner. Total daily dose ~ 40 U.    Type 2 diabetes mellitus with diabetic autonomic neuropathy, with long-term current use of insulin (H)       ONETOUCH VERIO IQ test strip   Generic drug:  blood glucose monitoring     350 each    USE TO TEST BLOOD SUGARS 3-4 TIMES DAILY OR AS DIRECTED    Type 2 diabetes, uncontrolled, with neuropathy (H)       order for DME     1 Bottle    Equipment being ordered: CONTROL SOLUTION for checking BS.    Type 2 diabetes, HbA1C goal < 8% (H)       order for DME     1 Units    Resmed Aircurve 10 auto bilevel 17/11 cm, Mirage Fx Wide nasal mask w/chin strap.    CYRIL (obstructive sleep apnea)       oxyCODONE-acetaminophen 5-325 MG tablet    PERCOCET    24 tablet    Take 1-2 tablets by mouth every 4 hours as needed for pain (moderate to severe)    Disc disorder of cervical region, Primary osteoarthritis, unspecified site       spironolactone 50 MG tablet    ALDACTONE    180 tablet    Take 1 tablet (50 mg) by mouth 2 times daily    Essential hypertension       TYLENOL PO      Take 1,300 mg by mouth 3 times daily as needed        * Notice:  This list has 2 medication(s) that are the same as other medications prescribed for you. Read the directions carefully, and ask your doctor or other care provider to review them with you.

## 2018-11-28 NOTE — PATIENT INSTRUCTIONS
Increase the dose of Novolog for dinner to 30 U. Aim for the bedtime BG <140. If the bedtime BG is higher, you can use a correction of 1 U per 25 points above 140:  140 - 175 - 1 U   176 - 200 - 2 U   201 - 225 - 3 U   226 - 250 - 4 U   251 - 275 - 5 U   276 - 300 - 6 U   ...  Maintain the dose of Tresiba at 50 U   Check BG at bedtime and before breakfast   If the breakfast BG is higher than the bedtime BG, increase the dose of Tresiba by 3 U every 3 days, until morning BG <130     Preventive Care:    Diabetic Eye Exam Screening: During our visit today, we discussed that it is recommended you receive diabetic eye exam screening. Please call or make an appointment with your primary care provider to discuss this with them. You may also call the [facility] scheduling line ([phone]) to set up an eye exam at one of the [facility] Eye Clinics.
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
## 2018-11-28 NOTE — NURSING NOTE
Kalia Boateng's goals for this visit include: follow up diabetes  He requests these members of his care team be copied on today's visit information: Yes    PCP: Scottie Boudreaux    Referring Provider:  Scottie Boudreaux MD  [address] [place] [address]    /76 (BP Location: Left arm, Patient Position: Sitting, Cuff Size: Adult Large)  Pulse 56  Wt 145.6 kg (321 lb)  SpO2 96%  BMI 45.41 kg/m2    Do you need any medication refills at today's visit? Yes
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
## 2018-12-08 ENCOUNTER — TRANSFERRED RECORDS (OUTPATIENT)
Dept: HEALTH INFORMATION MANAGEMENT | Facility: CLINIC | Age: 53
End: 2018-12-08

## 2018-12-10 ENCOUNTER — OFFICE VISIT (OUTPATIENT)
Dept: FAMILY MEDICINE | Facility: CLINIC | Age: 53
End: 2018-12-10
Payer: COMMERCIAL

## 2018-12-10 VITALS
HEART RATE: 82 BPM | RESPIRATION RATE: 20 BRPM | TEMPERATURE: 98 F | OXYGEN SATURATION: 96 % | WEIGHT: 315 LBS | HEIGHT: 70 IN | BODY MASS INDEX: 45.1 KG/M2 | SYSTOLIC BLOOD PRESSURE: 138 MMHG | DIASTOLIC BLOOD PRESSURE: 74 MMHG

## 2018-12-10 DIAGNOSIS — M19.91 PRIMARY OSTEOARTHRITIS, UNSPECIFIED SITE: ICD-10-CM

## 2018-12-10 DIAGNOSIS — M50.90 DISC DISORDER OF CERVICAL REGION: ICD-10-CM

## 2018-12-10 DIAGNOSIS — Z23 NEED FOR PROPHYLACTIC VACCINATION AND INOCULATION AGAINST INFLUENZA: ICD-10-CM

## 2018-12-10 DIAGNOSIS — M1A.09X0 IDIOPATHIC CHRONIC GOUT OF MULTIPLE SITES WITHOUT TOPHUS: Primary | ICD-10-CM

## 2018-12-10 LAB
CHOLEST SERPL-MCNC: 186 MG/DL
HDLC SERPL-MCNC: 36 MG/DL
LDLC SERPL CALC-MCNC: 104 MG/DL
NONHDLC SERPL-MCNC: 150 MG/DL
TRIGL SERPL-MCNC: 228 MG/DL
URATE SERPL-MCNC: 6.6 MG/DL (ref 3.5–7.2)

## 2018-12-10 PROCEDURE — 84550 ASSAY OF BLOOD/URIC ACID: CPT | Performed by: FAMILY MEDICINE

## 2018-12-10 PROCEDURE — 80061 LIPID PANEL: CPT | Performed by: FAMILY MEDICINE

## 2018-12-10 PROCEDURE — 36415 COLL VENOUS BLD VENIPUNCTURE: CPT | Performed by: FAMILY MEDICINE

## 2018-12-10 PROCEDURE — 90471 IMMUNIZATION ADMIN: CPT | Performed by: FAMILY MEDICINE

## 2018-12-10 PROCEDURE — 90682 RIV4 VACC RECOMBINANT DNA IM: CPT | Performed by: FAMILY MEDICINE

## 2018-12-10 PROCEDURE — 99214 OFFICE O/P EST MOD 30 MIN: CPT | Mod: 25 | Performed by: FAMILY MEDICINE

## 2018-12-10 RX ORDER — MELOXICAM 7.5 MG/1
7.5-15 TABLET ORAL DAILY
Qty: 60 TABLET | Refills: 11 | Status: SHIPPED | OUTPATIENT
Start: 2018-12-10 | End: 2018-12-18

## 2018-12-10 RX ORDER — ALLOPURINOL 300 MG/1
300 TABLET ORAL DAILY
Qty: 90 TABLET | Refills: 3 | Status: SHIPPED | OUTPATIENT
Start: 2018-12-10 | End: 2019-05-29

## 2018-12-10 ASSESSMENT — MIFFLIN-ST. JEOR: SCORE: 2319.98

## 2018-12-10 ASSESSMENT — PAIN SCALES - GENERAL: PAINLEVEL: SEVERE PAIN (7)

## 2018-12-10 NOTE — PROGRESS NOTES
"            SUBJECTIVE:   Kalia Boateng is a 53 year old male who presents to clinic today for the following health issues:      Medication Followup / refill     Taking Medication as prescribed: yes    Side Effects:  None    Medication Helping Symptoms:  yes           Problem list and histories reviewed & adjusted, as indicated.  Additional history: as documented        Reviewed and updated as needed this visit by clinical staff  Tobacco  Allergies  Meds  Problems  Med Hx  Surg Hx  Fam Hx       Reviewed and updated as needed this visit by Provider  Tobacco  Allergies  Meds  Problems  Med Hx  Surg Hx  Fam Hx         Patient today for follow-up on his gout management.  He is on allopurinol for gout prevention and this has been working well for him.  He has not had a a flare since last time I saw him.  He is on 300 mg of allopurinol daily.    Patient also has neck pain and degenerative disc disease in the cervical spine.  He has had meloxicam in the past which has been helpful for him.    Patient has type 2 diabetes with neuropathy and this is being managed by endocrinology.  I reviewed his labs and recent treatments with the patient.  His diabetes is not at goal.  His kidney function is okay though he has history of proteinuria.    ROS:  10 point ROS of systems including Constitutional, Eyes, HENT, Respiratory, Cardiovascular, Gastroenterology, Genitourinary, Integumentary, Muscularskeletal, Psychiatric were all negative except for pertinent positives noted in my HPI.     OBJECTIVE:   /74   Pulse 82   Temp 98  F (36.7  C) (Temporal)   Resp 20   Ht 1.778 m (5' 10\")   Wt 147.9 kg (326 lb)   SpO2 96%   BMI 46.78 kg/m    Body mass index is 46.78 kg/m .  Physical Exam   Constitutional: He appears well-developed and well-nourished.   Cardiovascular: Normal rate, regular rhythm, S1 normal, S2 normal and normal heart sounds.   No murmur heard.  Pulmonary/Chest: Effort normal and breath sounds " normal. No respiratory distress. He has no wheezes. He has no rhonchi. He has no rales.   Neurological: He is alert.         ASSESSMENT/PLAN:       ICD-10-CM    1. Idiopathic chronic gout of multiple sites without tophus M1A.09X0 allopurinol (ZYLOPRIM) 300 MG tablet     Uric acid   2. Disc disorder of cervical region M50.90    3. Type 2 diabetes, uncontrolled, with neuropathy (H) E11.40 Lipid panel reflex to direct LDL Fasting    E11.65    4. Primary osteoarthritis, unspecified site M19.91 meloxicam (MOBIC) 7.5 MG tablet   5. Need for prophylactic vaccination and inoculation against influenza Z23 FLU VACCINE, (RIV4) RECOMBINANT HA  , IM (FluBlok, egg free) [65305]- >18 YRS (FMG recommended  50-64 YRS)     Vaccine Administration, Initial [34020]     PLAN:  1.    Medications refilled for all other above noted stable conditions.  Labs ordered as noted above.  2.  He will continue to follow with endocrinology per their directions for management of his diabetes.    Follow up with Provider - Return in about 1 year (around 12/10/2019) for gout recheck.      Scottie Boudreaux MD   [facility]

## 2018-12-10 NOTE — PROGRESS NOTES

## 2018-12-14 ENCOUNTER — TELEPHONE (OUTPATIENT)
Dept: FAMILY MEDICINE | Facility: CLINIC | Age: 53
End: 2018-12-14

## 2018-12-14 DIAGNOSIS — M19.91 PRIMARY OSTEOARTHRITIS, UNSPECIFIED SITE: ICD-10-CM

## 2018-12-14 NOTE — TELEPHONE ENCOUNTER
Prior Authorization Retail Medication Request    Medication/Dose: Meloxicam 7.5 mg - 15 mg daily  ICD code (if different than what is on RX):    Previously Tried and Failed:  Celebrex   Rationale:  Complex patient with one or more chronic health conditions and is stable on current medication    Insurance Name:  MEDICA/MEDICA CHOICE, ([employer]OVER[employer] MEDS...KEY: [id number])  Insurance ID:  [id number]      Pharmacy Information (if different than what is on RX)  Name:  [facility]  Phone:  [phone]

## 2018-12-17 NOTE — RESULT ENCOUNTER NOTE
Kalia,  Your results of your cholesterol were sent to your endocrinologist as an FYI.  She can discuss with you the results with this in regards to the Lipitor that she is currently prescribing you.  Your uric acid level looks fine at this point.  No changes to the dose of your medication are needed.  Please let me know if you have any questions.    Sincerely,  Dr. Boudreaux

## 2018-12-17 NOTE — TELEPHONE ENCOUNTER
Central Prior Authorization Team   Phone: [phone]    PA Initiation    Medication: Meloxicam   Insurance Company: [employer] - Phone [phone] Fax [phone]  Pharmacy Filling the Rx: [facility] - [place] - [address]  Filling Pharmacy Phone: [phone]  Filling Pharmacy Fax:    Start Date: 12/17/2018

## 2018-12-18 RX ORDER — MELOXICAM 15 MG/1
7.5-15 TABLET ORAL DAILY
Qty: 30 TABLET | Refills: 11 | Status: SHIPPED | OUTPATIENT
Start: 2018-12-18 | End: 2019-12-26

## 2018-12-18 NOTE — TELEPHONE ENCOUNTER
Tablet size changed per insurance requirements.  Will have staff notify patient.    Scottie Boudreaux MD

## 2018-12-18 NOTE — TELEPHONE ENCOUNTER
PRIOR AUTHORIZATION DENIED    Medication: Meloxicam     Denial Date: 12/18/2018    Denial Rational: Insurance will only cover a maximum of one tablet per day of any strength.  There is no PA or quantity limit over ride for the medication.  Insurance stated that the patient would have to utilize the 15 mg tablet.  They will not cover two 7.5 mg tablets per day.           Appeal Information: Appeal is not available as insurance will not even do a claim for the quantity limit over ride.

## 2018-12-19 DIAGNOSIS — E11.43 TYPE 2 DIABETES MELLITUS WITH AUTONOMIC NEUROPATHY (H): ICD-10-CM

## 2018-12-20 RX ORDER — METFORMIN HCL 500 MG
TABLET, EXTENDED RELEASE 24 HR ORAL
Qty: 360 TABLET | Refills: 3 | Status: SHIPPED | OUTPATIENT
Start: 2018-12-20 | End: 2019-11-26

## 2018-12-31 DIAGNOSIS — I10 ESSENTIAL HYPERTENSION: ICD-10-CM

## 2018-12-31 DIAGNOSIS — I10 UNCONTROLLED HYPERTENSION: ICD-10-CM

## 2018-12-31 RX ORDER — DOXAZOSIN 4 MG/1
TABLET ORAL
Qty: 90 TABLET | Refills: 3 | Status: SHIPPED | OUTPATIENT
Start: 2018-12-31 | End: 2019-12-15

## 2018-12-31 RX ORDER — LISINOPRIL 40 MG/1
TABLET ORAL
Qty: 90 TABLET | Refills: 3 | Status: SHIPPED | OUTPATIENT
Start: 2018-12-31 | End: 2019-12-15

## 2018-12-31 NOTE — TELEPHONE ENCOUNTER
Message from pharmacy, patient is switching to [facility] and needs a new prescription sent to them of   [facility] -   [address]  [facility] [address]  Phone: [phone] Fax: [phone].     Medication Requested: Doxazosin 4 mg tabs  Directions: 1 tab by mouth at bedtime  Quantity: 90    Medication Requested: Lisinopril 40 mg  Directions: take 1/2 tab by mouth twice daily  Quantity: 90  Last Office Visit: 11/28/18  Next Appointment Scheduled for: 02/27/19    Marly Bravo CMA  Adult Endocrinology  [facility]

## 2019-01-09 ENCOUNTER — TELEPHONE (OUTPATIENT)
Dept: ENDOCRINOLOGY | Facility: CLINIC | Age: 54
End: 2019-01-09

## 2019-01-09 NOTE — TELEPHONE ENCOUNTER
Message from [facility] pharmacy requesting a new prescription for    1. Accu-Chek guide test strips    2. Accu-chek fastclix lancets    Please advise.

## 2019-01-14 DIAGNOSIS — E11.8 TYPE 2 DIABETES MELLITUS WITH COMPLICATION, WITH LONG-TERM CURRENT USE OF INSULIN (H): Primary | ICD-10-CM

## 2019-01-14 DIAGNOSIS — Z79.4 TYPE 2 DIABETES MELLITUS WITH COMPLICATION, WITH LONG-TERM CURRENT USE OF INSULIN (H): Primary | ICD-10-CM

## 2019-01-14 RX ORDER — LANCETS
EACH MISCELLANEOUS
Qty: 408 EACH | Refills: 3 | Status: SHIPPED | OUTPATIENT
Start: 2019-01-14

## 2019-01-14 NOTE — TELEPHONE ENCOUNTER
Requested prescriptions sent to [facility].     Madison Douglas, RN, BSN, CDE   [facility]

## 2019-02-18 DIAGNOSIS — I10 ESSENTIAL HYPERTENSION WITH GOAL BLOOD PRESSURE LESS THAN 140/90: ICD-10-CM

## 2019-02-18 RX ORDER — CHLORTHALIDONE 25 MG/1
TABLET ORAL
Qty: 90 TABLET | Refills: 0 | Status: SHIPPED | OUTPATIENT
Start: 2019-02-18 | End: 2019-05-18

## 2019-02-27 ENCOUNTER — OFFICE VISIT (OUTPATIENT)
Dept: ENDOCRINOLOGY | Facility: CLINIC | Age: 54
End: 2019-02-27
Payer: COMMERCIAL

## 2019-02-27 VITALS
WEIGHT: 315 LBS | HEIGHT: 70 IN | HEART RATE: 91 BPM | OXYGEN SATURATION: 94 % | DIASTOLIC BLOOD PRESSURE: 65 MMHG | SYSTOLIC BLOOD PRESSURE: 125 MMHG | BODY MASS INDEX: 45.1 KG/M2

## 2019-02-27 DIAGNOSIS — E66.01 MORBID OBESITY (H): ICD-10-CM

## 2019-02-27 DIAGNOSIS — E06.3 HASHIMOTO'S THYROIDITIS: ICD-10-CM

## 2019-02-27 DIAGNOSIS — E11.21 TYPE 2 DIABETES MELLITUS WITH DIABETIC NEPHROPATHY, WITH LONG-TERM CURRENT USE OF INSULIN (H): ICD-10-CM

## 2019-02-27 DIAGNOSIS — I10 ESSENTIAL HYPERTENSION: Primary | ICD-10-CM

## 2019-02-27 DIAGNOSIS — Z79.4 TYPE 2 DIABETES MELLITUS WITH DIABETIC NEPHROPATHY, WITH LONG-TERM CURRENT USE OF INSULIN (H): ICD-10-CM

## 2019-02-27 LAB — HBA1C MFR BLD: 7.3 % (ref 0–5.6)

## 2019-02-27 PROCEDURE — 99214 OFFICE O/P EST MOD 30 MIN: CPT | Performed by: INTERNAL MEDICINE

## 2019-02-27 PROCEDURE — 36415 COLL VENOUS BLD VENIPUNCTURE: CPT | Performed by: INTERNAL MEDICINE

## 2019-02-27 PROCEDURE — 83036 HEMOGLOBIN GLYCOSYLATED A1C: CPT | Performed by: INTERNAL MEDICINE

## 2019-02-27 ASSESSMENT — MIFFLIN-ST. JEOR: SCORE: 2291.4
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
## 2019-02-27 NOTE — NURSING NOTE
"aKlia Boateng's goals for this visit include:   Chief Complaint   Patient presents with     RECHECK     Diabetes     He requests these members of his care team be copied on today's visit information: Yes    PCP: Scottie Boudreaux    Referring Provider:  Scottie Boudreaux MD  [address] [place] [address]    /65 (BP Location: Left arm, Patient Position: Sitting, Cuff Size: Adult Large)   Pulse 91   Ht 1.786 m (5' 10.32\")   Wt 145 kg (319 lb 11.2 oz)   SpO2 94%   BMI 45.46 kg/m      Do you need any medication refills at today's visit? No    "
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
## 2019-03-11 ENCOUNTER — MYC REFILL (OUTPATIENT)
Dept: ENDOCRINOLOGY | Facility: CLINIC | Age: 54
End: 2019-03-11

## 2019-03-11 DIAGNOSIS — I10 ESSENTIAL HYPERTENSION: ICD-10-CM

## 2019-03-11 RX ORDER — SPIRONOLACTONE 50 MG/1
50 TABLET, FILM COATED ORAL 2 TIMES DAILY
Qty: 180 TABLET | Refills: 2 | Status: SHIPPED | OUTPATIENT
Start: 2019-03-11 | End: 2019-07-25

## 2019-05-13 DIAGNOSIS — Z79.4 TYPE 2 DIABETES MELLITUS WITH DIABETIC AUTONOMIC NEUROPATHY, WITH LONG-TERM CURRENT USE OF INSULIN (H): ICD-10-CM

## 2019-05-13 DIAGNOSIS — E11.43 TYPE 2 DIABETES MELLITUS WITH DIABETIC AUTONOMIC NEUROPATHY, WITH LONG-TERM CURRENT USE OF INSULIN (H): ICD-10-CM

## 2019-05-18 ENCOUNTER — MYC REFILL (OUTPATIENT)
Dept: ENDOCRINOLOGY | Facility: CLINIC | Age: 54
End: 2019-05-18

## 2019-05-18 DIAGNOSIS — I10 ESSENTIAL HYPERTENSION WITH GOAL BLOOD PRESSURE LESS THAN 140/90: ICD-10-CM

## 2019-05-18 RX ORDER — CHLORTHALIDONE 25 MG/1
25 TABLET ORAL DAILY
Qty: 90 TABLET | Refills: 0 | Status: CANCELLED | OUTPATIENT
Start: 2019-05-18

## 2019-05-20 RX ORDER — CHLORTHALIDONE 25 MG/1
TABLET ORAL
Qty: 90 TABLET | Refills: 1 | Status: SHIPPED | OUTPATIENT
Start: 2019-05-20 | End: 2019-11-19

## 2019-05-22 ENCOUNTER — APPOINTMENT (OUTPATIENT)
Dept: GENERAL RADIOLOGY | Facility: CLINIC | Age: 54
End: 2019-05-22
Attending: FAMILY MEDICINE
Payer: OTHER MISCELLANEOUS

## 2019-05-22 ENCOUNTER — HOSPITAL ENCOUNTER (EMERGENCY)
Facility: CLINIC | Age: 54
Discharge: HOME | End: 2019-05-22
Attending: FAMILY MEDICINE | Admitting: FAMILY MEDICINE
Payer: OTHER MISCELLANEOUS

## 2019-05-22 ENCOUNTER — TELEPHONE (OUTPATIENT)
Dept: EMERGENCY MEDICINE | Facility: CLINIC | Age: 54
End: 2019-05-22

## 2019-05-22 VITALS
RESPIRATION RATE: 18 BRPM | WEIGHT: 315 LBS | DIASTOLIC BLOOD PRESSURE: 89 MMHG | TEMPERATURE: 98.5 F | SYSTOLIC BLOOD PRESSURE: 137 MMHG | BODY MASS INDEX: 45.1 KG/M2 | OXYGEN SATURATION: 96 % | HEART RATE: 65 BPM | HEIGHT: 70 IN

## 2019-05-22 DIAGNOSIS — S82.892A CLOSED AVULSION FRACTURE OF LEFT ANKLE, INITIAL ENCOUNTER: ICD-10-CM

## 2019-05-22 PROCEDURE — 73630 X-RAY EXAM OF FOOT: CPT | Mod: TC,LT

## 2019-05-22 PROCEDURE — 99284 EMERGENCY DEPT VISIT MOD MDM: CPT | Mod: 25 | Performed by: FAMILY MEDICINE

## 2019-05-22 PROCEDURE — 27786 TREATMENT OF ANKLE FRACTURE: CPT | Mod: 54 | Performed by: FAMILY MEDICINE

## 2019-05-22 PROCEDURE — 27786 TREATMENT OF ANKLE FRACTURE: CPT | Mod: LT | Performed by: FAMILY MEDICINE

## 2019-05-22 PROCEDURE — 73610 X-RAY EXAM OF ANKLE: CPT | Mod: TC,LT

## 2019-05-22 RX ORDER — OXYCODONE AND ACETAMINOPHEN 5; 325 MG/1; MG/1
1-2 TABLET ORAL EVERY 4 HOURS PRN
Qty: 10 TABLET | Refills: 0 | Status: SHIPPED | OUTPATIENT
Start: 2019-05-22 | End: 2019-05-28

## 2019-05-22 ASSESSMENT — MIFFLIN-ST. JEOR: SCORE: 2265.08

## 2019-05-22 NOTE — ED AVS SNAPSHOT
[facility] Emergency Department  [address] [place] [address]  Phone:  [phone]  Fax:  [phone]                                    Kalia Boateng   MRN: [id number]    Department:  [facility] Emergency Department   Date of Visit:  5/22/2019           After Visit Summary Signature Page    I have received my discharge instructions, and my questions have been answered. I have discussed any challenges I see with this plan with the nurse or doctor.    ..........................................................................................................................................  Patient/Patient Representative Signature      ..........................................................................................................................................  Patient Representative Print Name and Relationship to Patient    ..................................................               ................................................  Date                                   Time    ..........................................................................................................................................  Reviewed by Signature/Title    ...................................................              ..............................................  Date                                               Time          22EPIC Rev 08/18

## 2019-05-22 NOTE — ED TRIAGE NOTES
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 It is swollen, painful and he is having difficulty bearing weight.

## 2019-05-22 NOTE — TELEPHONE ENCOUNTER
Any doctor only slot that is NOT 12:15 is fine.  Will have staff notify patient.     Scottie Boudreaux MD

## 2019-05-22 NOTE — TELEPHONE ENCOUNTER
Reason for Call:  Same Day Appointment, Requested Provider:  Scottie Boudreaux M.D.    PCP: Scottie Boudreaux    Reason for visit: e/d f/u, broken ankle, work comp    Duration of symptoms: injury date 5-    Have you been treated for this in the past? Yes    Additional comments: see in [facility] ED today, diagnosed with broken ankle. Told to f/u with PCP around 5-29-19. Would Dr. Boudreaux be willing to work him in? He would prefer an afternoon appointment but would be grateful for anytime.      Can we leave a detailed message on this number? YES    Phone number patient can be reached at: Cell number on file:    Telephone Information:   Mobile [phone]       Best Time: asap    Call taken on 5/22/2019 at 10:58 AM by Ike Velazquez

## 2019-05-22 NOTE — TELEPHONE ENCOUNTER
Patient notified that Dr. Boudreaux is not in clinic on Wednesdays. Offered either Tuesday 5/28 or Thursday 5/30 at 1:00 pm for availability. Patient stating Tuesday 5/28 would work. Patient is scheduled on Tuesday, May 28th at 1:00 pm with Dr. Boudreaux for follow up on WC injury. Stacy Ko LPN

## 2019-05-22 NOTE — ED PROVIDER NOTES
History     Chief Complaint   Patient presents with     Trauma     HPI  Kalia Boateng is a 54 year old male who presents with left ankle and foot pain after a fall.  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  This happened at work and just prior to arrival.  Patient is been able to bear a little bit of weight but it is significantly swollen and painful.  Patient denies any other injuries.  He has injured his ankle in the past.    Allergies:  Allergies   Allergen Reactions     No Known Drug Allergies        Problem List:    Patient Active Problem List    Diagnosis Date Noted     Type 2 diabetes mellitus with diabetic nephropathy, with long-term current use of insulin (H) 12/04/2017     Priority: Medium     Multiple thyroid nodules 12/04/2017     Priority: Medium     Strain of neck muscle, subsequent encounter 08/24/2016     Priority: Medium     CAD (coronary artery disease) 08/16/2016     Priority: Medium     2010 coronary CT angiogram, which was a technically difficult study but was concerning for obstructive CAD.  Subsequent coronary angiogram showed moderate narrowing of diagonal branch and very terminal branch of circumflex vessel.         Sleep-related hypoventilation 07/14/2016     Priority: Medium     CYRIL (obstructive sleep apnea) Severe 07/12/2016     Priority: Medium     Disc disorder of cervical region 03/20/2015     Priority: Medium     Work Comp injury       Advanced care planning/counseling discussion 04/21/2014     Priority: Medium     Forms given to patient on 4/21/14.  Tamiko River CMA         Essential hypertension with goal blood pressure less than 140/90 05/25/2011     Priority: Medium     Osteoarthritis 07/10/2008     Priority: Medium     Idiopathic chronic gout of multiple sites without tophus 01/30/2006     Priority: Medium     Problem list name updated by automated process. Provider to review       Proteinuria 09/24/2004     Priority: Medium      Morbid obesity, unspecified obesity type (H) 08/06/2004     Priority: Medium        Past Medical History:    Past Medical History:   Diagnosis Date     AC joint arthropathy 11/6/2015     Closed fracture of shaft of left humerus 7/27/2015     Closed fracture of shaft of left humerus with routine healing 10/9/2015     Diabetic eye exam (H) 09/07/11     Dysfunction of left rotator cuff 10/9/2015     Essential hypertension      Gout      NONSPECIFIC MEDICAL HISTORY 1980     CYRIL (obstructive sleep apnea) 2011     Osteoarthrosis, unspecified whether generalized or localized, lower leg      Other and unspecified hyperlipidemia      Proteinuria      Type II or unspecified type diabetes mellitus without mention of complication, not stated as uncontrolled        Past Surgical History:    Past Surgical History:   Procedure Laterality Date     EXCISE LESION FACE Right 1/24/2017    Procedure: EXCISE LESION FACE;  Surgeon: Bhanu Graham MD;  Location: [facility] OR     HC ARTHROTOMY W/OPEN MENISCUS REPAIR  12/05/2002    1)Arthroscopic partial medial meniscectomy, left knee.  2)Chondroplasty, medial femoral condyle, left knee.  3)Debridement of medial plica, arthroscopic, left knee.     HC KNEE SCOPE, DIAGNOSTIC      Arthroscopy, Knee     HC KNEE SCOPE,MED/LAT MENISECTOMY  1/26/2005     Arthroscopic partial medial meniscectomy, right knee.     HC REPAIR ROTATOR CUFF,ACUTE  1990's    Rt Rotator Cuff Repair     HC VASECTOMY UNILAT/BILAT W POSTOP SEMEN  1991    Vasectomy     INJECT JOINT SACROILIAC Bilateral 6/24/2015    Procedure: INJECT JOINT SACROILIAC;  Surgeon: James Leahy MD;  Location: [facility] OR     JOINT REPLACEMTN, KNEE RT/LT  11/11/09    Medial unicompartmental arthroplasty, left knee.       Family History:    Family History   Problem Relation Age of Onset     Lipids Mother      Hypertension Mother      Cancer Paternal Uncle         x3     Heart Disease Father        Social History:  Marital Status:   [2]  Social History  "    Tobacco Use     Smoking status: Former Smoker     Packs/day: 0.50     Years: 28.00     Pack years: 14.00     Types: Cigarettes     Last attempt to quit: 2010     Years since quittin.3     Smokeless tobacco: Former User     Types: Chew     Quit date: 2009   Substance Use Topics     Alcohol use: Yes     Alcohol/week: 1.0 oz     Types: 2 Cans of beer per week     Drug use: No        Medications:      Acetaminophen (TYLENOL PO)   allopurinol (ZYLOPRIM) 300 MG tablet   amLODIPine (NORVASC) 5 MG tablet   aspirin 81 MG EC tablet   atorvastatin (LIPITOR) 40 MG tablet   B-D U/F 31G X 8 MM insulin pen needle   blood glucose (ACCU-CHEK GUIDE) test strip   blood glucose monitoring (ACCU-CHEK FASTCLIX) lancets   blood glucose monitoring (ONE TOUCH DELICA) lancets   chlorthalidone (HYGROTON) 25 MG tablet   colchicine 0.6 MG tablet   doxazosin (CARDURA) 4 MG tablet   dulaglutide (TRULICITY) 1.5 MG/0.5ML pen   glipiZIDE (GLUCOTROL XL) 10 MG 24 hr tablet   insulin aspart (NOVOLOG FLEXPEN) 100 UNIT/ML pen   insulin degludec (TRESIBA FLEXTOUCH) 200 UNIT/ML pen   insulin pen needle (B-D U/F) 31G X 8 MM   lisinopril (PRINIVIL/ZESTRIL) 40 MG tablet   meloxicam (MOBIC) 15 MG tablet   metFORMIN (GLUCOPHAGE-XR) 500 MG 24 hr tablet   ONETOUCH VERIO IQ test strip   order for DME   ORDER FOR DME   oxyCODONE-acetaminophen (PERCOCET) 5-325 MG per tablet   spironolactone (ALDACTONE) 50 MG tablet         Review of Systems   All other systems reviewed and are negative.      Physical Exam   BP: 137/89  Pulse: 65  Temp: 98.5  F (36.9  C)  Resp: 18  Height: 177.8 cm (5' 10\")  Weight: 142.9 kg (315 lb)  SpO2: 96 %      Physical Exam   Constitutional: He appears well-developed and well-nourished. No distress.   Musculoskeletal:        Left ankle: He exhibits decreased range of motion, swelling and ecchymosis. He exhibits no deformity, no laceration and normal pulse. Tenderness. Lateral malleolus tenderness found.        Left foot: " There is tenderness (5th metatarsal head) and bony tenderness. There is normal range of motion.   Skin: He is not diaphoretic.   Nursing note and vitals reviewed.      ED Course        Procedures    Results for orders placed or performed during the hospital encounter of 05/22/19   XR Ankle Left G/E 3 Views    Narrative    LEFT ANKLE THREE OR MORE VIEWS  5/22/2019 9:50 AM     HISTORY: Rolled his ankle. It is swollen and painful.    COMPARISON: December 31, 2005      Impression    IMPRESSION: Ankle mortise intact. Significant lateral soft tissue  swelling. Calcifications noted adjacent to the distal fibula, some of  which were present on previous exam, though greater on today's exam.  There may be a new small avulsion fracture relative to previous exam,  demarcated by arrow on radiograph. Fracture fragment is estimated to  be 6 mm.   Foot XR, G/E 3 views, left    Narrative    LEFT FOOT THREE OR MORE VIEWS  5/22/2019 9:50 AM     HISTORY:  Left foot pain.    COMPARISON: None.      Impression    IMPRESSION: Significant lateral soft tissue swelling. Please see ankle  radiographs for description of suspected avulsion fracture from distal  fibula. No definite additional fractures identified.     Medications - No data to display     X-ray was mostly unremarkable, there is a question of a distal avulsion fracture.  We will put the patient in a walking boot and give crutches.  Patient was given limited duty for work.  We will have the patient follow-up with podiatry in the next week if things are not improving.    Assessments & Plan (with Medical Decision Making)  Avulsion fracture of the left ankle     I have reviewed the nursing notes.    I have reviewed the findings, diagnosis, plan and need for follow up with the patient.              5/22/2019   [facility] EMERGENCY DEPARTMENT     Fransisco Oleary MD  05/22/19 [id number]

## 2019-05-22 NOTE — LETTER
[facility] EMERGENCY DEPARTMENT  [address]  [place] [address]  [phone]      May 22, 2019    Kalia Boateng  [address] [place] [address]  [phone] (home) [phone] (work)    : 1965      To Whom it may concern:    Kalia Boateng was seen in our Emergency Department today, May 22, 2019 for an injury that was reported to be work related.        After returning to work the following restrictions apply for 7 days:   no bending or lifting, sitting work only and no use of left leg    The employee must keep the injured site elevated.    Sincerely,    Fransisco Oleary MD

## 2019-05-28 ENCOUNTER — OFFICE VISIT (OUTPATIENT)
Dept: FAMILY MEDICINE | Facility: CLINIC | Age: 54
End: 2019-05-28
Payer: OTHER MISCELLANEOUS

## 2019-05-28 VITALS
BODY MASS INDEX: 45.1 KG/M2 | SYSTOLIC BLOOD PRESSURE: 134 MMHG | HEART RATE: 74 BPM | WEIGHT: 315 LBS | DIASTOLIC BLOOD PRESSURE: 72 MMHG | RESPIRATION RATE: 18 BRPM | TEMPERATURE: 98.5 F | OXYGEN SATURATION: 99 % | HEIGHT: 70 IN

## 2019-05-28 DIAGNOSIS — S82.892A: ICD-10-CM
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
## 2019-05-28 PROCEDURE — 99207 C FRACTURE CARE IN GLOBAL PERIOD: CPT | Performed by: FAMILY MEDICINE

## 2019-05-28 ASSESSMENT — MIFFLIN-ST. JEOR: SCORE: 2274.15

## 2019-05-28 ASSESSMENT — PAIN SCALES - GENERAL: PAINLEVEL: MODERATE PAIN (4)

## 2019-05-28 NOTE — LETTER
REPORT OF WORK COMP    [address] [address] [phone]      PATIENT DATA    Employee Name: Kalia Boateng      : 1965     #: [id number]    Work related injury: Yes  Employer at time of injury: [employer]  Employer contact & phone: [employer]- [phone]  Employed elsewhere? No  Workers' Compensation Carrier/Managed Care Plan:      Today's date: 2019  Date of injury: 2019  Date of first visit: 2019 in ED     PROVIDER EVALUATION: Please fill in as needed.  Please give copy to employee for employer.     1. Diagnosis: Closed avulsion fracture of left ankle    2. Treatment: immobilization for past 6 days, now with ace wrap and stirrup splint;  Physical therapy referral.  3. Medication: acetaminophen as needed   NOTE: When ordering a medication, [place] Rules require Work Comp or WC on prescriptions.    4. Return to work date: May 28, 2019    ** WITH RESTRICTIONS? Yes, with work restrictions: * Other: limited use of activity that requires full weightbearing on left foot.  Okay for driving.  DURATION OF LIMITATIONS: 4 weeks      RESTRICTIONS: Unlimited unless listed.  Restrictions apply to home and leisure also.  If work restrictions is not available, the employee is totally disabled.    Maximum Medical Improvement (Date): n/a  Any Permanent Partial Disability? Deferred to future exam/consult.    Provider comments: none    Medical Examiner: Dr. Scottie Boudreaux MD           License or registration:    Next appointment: 1 month    CC: Employer, Managed Care Plan/Payor, Patient

## 2019-05-28 NOTE — PROGRESS NOTES
"Subjective     Kalia Boateng is a 54 year old male who presents to clinic today for the following health issues:    HPI   ED/UC Followup:    Facility:  [facility]  Date of visit: 5/22/2019  Reason for visit: left ankle injury  Current Status: pt is walking with his boot on, no crutches today and states it feels better. DOI, 5-22-19. Employer is [employer].      Patient inverted ankle 6 days ago and seen for evaluation in ER.  Found to have avulsion fracture of lateral malleolus. Was placed in walking boot and told to follow-up with me.  He has been doing well with boot and wearing it 24/7 except for last night.  Pain has vastly improved.    Reviewed and updated as needed this visit by Provider  Tobacco  Allergies  Meds  Problems  Med Hx  Surg Hx  Fam Hx         Review of Systems   ROS COMP: Constitutional, HEENT, cardiovascular, pulmonary, GI, , musculoskeletal, neuro, skin, endocrine and psych systems are negative, except as otherwise noted.      Objective    /72   Pulse 74   Temp 98.5  F (36.9  C) (Temporal)   Resp 18   Ht 1.778 m (5' 10\")   Wt 143.8 kg (317 lb)   SpO2 99%   BMI 45.48 kg/m    Body mass index is 45.48 kg/m .  Physical Exam   Right Ankle Exam     Tenderness   The patient is experiencing tenderness in the lateral malleolus.  Swelling: none    Range of Motion   Dorsiflexion: normal   Plantar flexion: normal   Eversion: normal   Inversion: normal     Muscle Strength   Dorsiflexion:  5/5  Plantar flexion:  5/5  Anterior tibial:  5/5  Posterior tibial:  5/5  Peroneal muscle:  5/5    Tests   Anterior drawer: negative    Other   Erythema: absent  Sensation: normal  Pulse: present       Left Ankle Exam   Left ankle exam is normal.                 Assessment & Plan     ASSESSMENT/ORDERS:    ICD-10-CM    1. Work related injury Y99.0 PHYSICAL THERAPY REFERRAL     FRACTURE CARE IN GLOBAL PERIOD   2. Closed avulsion fracture of ankle, left, initial encounter S82.892A PHYSICAL THERAPY REFERRAL     " "FRACTURE CARE IN GLOBAL PERIOD     PLAN:  1.  X-ray reviewed.  Has tip of fibula fracture.  Will treat like severe ankle sprain. He is able to transition to ace wrap and gel stirrup splint.  I also recommended early referral to physical therapy for long term ankle stability.  We discussed that failing to do physical therapy now may cause long term stability and pain issues later.   2.  Letter drafted for work stating his work limitations.     BMI:   Estimated body mass index is 45.48 kg/m  as calculated from the following:    Height as of this encounter: 1.778 m (5' 10\").    Weight as of this encounter: 143.8 kg (317 lb).   Weight management plan: deferred to future visit      Return in about 1 month (around 6/25/2019) for ankle recheck.    Scottie Boudreaux MD  [facility]    "

## 2019-05-29 ENCOUNTER — MYC REFILL (OUTPATIENT)
Dept: FAMILY MEDICINE | Facility: CLINIC | Age: 54
End: 2019-05-29

## 2019-05-29 DIAGNOSIS — M1A.09X0 IDIOPATHIC CHRONIC GOUT OF MULTIPLE SITES WITHOUT TOPHUS: ICD-10-CM

## 2019-05-30 RX ORDER — ALLOPURINOL 300 MG/1
300 TABLET ORAL DAILY
Qty: 90 TABLET | Refills: 1 | Status: SHIPPED | OUTPATIENT
Start: 2019-05-30 | End: 2019-11-26

## 2019-05-30 NOTE — TELEPHONE ENCOUNTER
[facility] remainder [facility].  Closing this encounter.  Yee Lopez, BERNARDAN, RN

## 2019-05-31 ENCOUNTER — MYC REFILL (OUTPATIENT)
Dept: ENDOCRINOLOGY | Facility: CLINIC | Age: 54
End: 2019-05-31

## 2019-05-31 DIAGNOSIS — I10 BENIGN ESSENTIAL HYPERTENSION: ICD-10-CM

## 2019-05-31 RX ORDER — AMLODIPINE BESYLATE 5 MG/1
5 TABLET ORAL DAILY
Qty: 90 TABLET | Refills: 3 | Status: CANCELLED | OUTPATIENT
Start: 2019-05-31

## 2019-06-04 NOTE — TELEPHONE ENCOUNTER
Duplicate request.       Selma Stevenson, RN  Endocrine Care Coordinator  [facility]

## 2019-06-12 ENCOUNTER — HOSPITAL ENCOUNTER (OUTPATIENT)
Dept: PHYSICAL THERAPY | Facility: CLINIC | Age: 54
Setting detail: THERAPIES SERIES
End: 2019-06-12
Attending: FAMILY MEDICINE
Payer: OTHER MISCELLANEOUS
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
## 2019-06-12 DIAGNOSIS — S82.892A: ICD-10-CM

## 2019-06-12 PROCEDURE — 97110 THERAPEUTIC EXERCISES: CPT | Mod: GP | Performed by: PHYSICAL THERAPIST

## 2019-06-12 PROCEDURE — 97035 APP MDLTY 1+ULTRASOUND EA 15: CPT | Mod: GP | Performed by: PHYSICAL THERAPIST

## 2019-06-12 PROCEDURE — 97162 PT EVAL MOD COMPLEX 30 MIN: CPT | Mod: GP | Performed by: PHYSICAL THERAPIST

## 2019-06-13 NOTE — PROGRESS NOTES
06/12/19 5074   General Information   Type of Visit Initial OP Ortho PT Evaluation   Start of Care Date 06/12/19   Referring Physician Dr. Scottie Boudreaux   Patient/Family Goals Statement Get back to normal without pain.   Orders Evaluate and Treat   Date of Order 05/28/19   Medical Diagnosis Work related injury; Closed avulsion fracture of ankle, left, initial encounter    Surgical/Medical history reviewed Yes   Precautions/Limitations no known precautions/limitations   Weight-Bearing Status - LUE full weight-bearing   Weight-Bearing Status - RUE full weight-bearing   Weight-Bearing Status - LLE full weight-bearing   Weight-Bearing Status - RLE full weight-bearing   Body Part(s)   Body Part(s) Ankle/Foot   Presentation and Etiology   Pertinent history of current problem (include personal factors and/or comorbidities that impact the POC) 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  Sustained a lateral maleolus avolusion.  No wrap or brace and the swelling is minimal.  Pain at the end of the day.  Pt states stairs are difficult but more in the knees then ankles.     Impairments A. Pain;F. Decreased strength and endurance;G. Impaired balance;D. Decreased ROM   Functional Limitations perform required work activities;perform activities of daily living   Symptom Location Lateral L ankle   How/Where did it occur With a fall;At work   Onset date of current episode/exacerbation 05/22/19   Chronicity New   Pain rating (0-10 point scale) Best (/10);Worst (/10)   Best (/10) 0/10   Worst (/10) 7/10   Pain quality C. Aching   Frequency of pain/symptoms C. With activity   Pain/symptoms are: Worse during the day   Pain/symptoms exacerbated by B. Walking;K. Home tasks;L. Work tasks   Pain/symptoms eased by C. Rest;H. Cold;J. Braces/supports   Progression of symptoms since onset: Improved   Prior Level of Function   Prior Level of Function-Mobility Independent   Prior Level of Function-ADLs Independent    Functional Level Prior Comment Independent   Current Level of Function   Current Community Support Family/friend caregiver   Patient role/employment history A. Employed   Employment Comments    [facility]   Home/community accessibility 20 steps to get into the house.   Current equipment-Gait/Locomotion None   Current equipment-ADL None   Fall Risk Screen   Fall screen completed by PT   Have you fallen 2 or more times in the past year? No   Have you fallen and had an injury in the past year? No   Is patient a fall risk? No   Functional Scales   Functional Scales Other   Other Scales  LEFS   Ankle/Foot Objective Findings   Side (if bilateral, select both right and left) Left;Right   Observation No acute distress.   Integumentary Maleolus measurements: R 28.2cm, L 29.9cm.  Midfoot measurements: R 26cm, L 27cm.  Figure 8 measurement: R 58 cm, L 58.8cm   Gait/Locomotion Slight antalgic gait pattern.   Kleiger ER Test (DF/Eversion Test) Negative   Windlass Test Negative   Longitudinal Arch Angle Test Negative   Anterior Drawer Test Negative   Posterior Drawer Test Negative   Talar Tilt Test Negative   Palpation Tenderness and swelling along anterior, inferior, and posterior lateral maleolus.   Accessory Motion/Joint Mobility Decreased distal fibular mobility on L side.   Left DF (Knee Ext) AROM 0 degrees and painful   Left PF AROM 50 degrees and painful   Left Calcanceal Inversion AROM 18 degrees and painful   Left Calcaneal Eversion AROM 8 degrees and painful   Left DF/Inversion Strength 5/5 pain   Left DF/Eversion Strength 4+/5 pain   Left PF/Inversion Strength 5/5 pain   Left PF/Eversion Strength 4+/5 pain   Left PF Strength 5/5   Left Gastroc (in WB) Flexibility Tightness   Left Soleus (in WB) Flexibility Tightness   Right DF (Knee Ext) AROM 5 degrees   Right PF AROM 50 degrees   Right Calcanceal Inversion AROM 20 degrees   Right Calcaneal Eversion AROM 15 degrees   Right  DF/Inversion Strength 5/5   Right DF/Eversion Strength 4+/5   Right PF/Inversion Strength 5/5   Right PF/Eversion Strength 4+/5   Right PF Strength 5/5   Right Gastroc (in WB) Flexibility Tightness   Right Soleus (in WB) Flexibility tightness   Planned Therapy Interventions   Planned Therapy Interventions balance training;gait training;joint mobilization;manual therapy;neuromuscular re-education;ROM;strengthening;stretching   Planned Modality Interventions   Planned Modality Interventions Cryotherapy;Ultrasound;Hot packs   Clinical Impression   Criteria for Skilled Therapeutic Interventions Met yes, treatment indicated   PT Diagnosis L ankle weakness, lack of ROM, and poor stability following an avulsion fraction of the lateral maleolus.   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  Pt complains of increasing pain towards the end of the work day, walking long distances, and stairs.  Pt has a history of diabetes (well controlled) and ankle injuries.  Pt will benefit from skilled PT services to address impairments and [facility] to [facility].   Clinical Decision Making (Complexity) Moderate complexity   Therapy Frequency 1 time/week   Predicted Duration of Therapy Intervention (days/wks) 8 weeks   Risk & Benefits of therapy have been explained Yes   Patient, Family & other staff in agreement with plan of care Yes   Education Assessment   Preferred Learning Style Listening;Demonstration;Pictures/video   Barriers to Learning No barriers   ORTHO GOALS   PT Ortho Eval Goals 1;2;3   Ortho Goal 1   Goal Identifier #1   Goal Description Pt will demonstrate 5-10 degrees L ankle DF painfree in order to have enough mobility to navigate stairs pain free.   Target Date  07/27/19   Ortho Goal 2   Goal Identifier #2   Goal Description Pt will demonstrate ability to go 10-12 hours at work with proper foot wear and no L ankle pain in order to return to normal work activities.   Target Date 08/11/19   Ortho Goal 3   Goal Identifier #3   Goal Description Pt will report >75% on the LEFS demonstrating improved mobility and function with less pain during daily activities.   Target Date 08/11/19   Total Evaluation Time   PT Eval, Moderate Complexity Minutes (22867) 30     Thank you for your referral.    Renetta Ann, PT, DPT  [facility]  [phone]

## 2019-06-18 ENCOUNTER — MYC MEDICAL ADVICE (OUTPATIENT)
Dept: FAMILY MEDICINE | Facility: CLINIC | Age: 54
End: 2019-06-18

## 2019-06-19 ENCOUNTER — HOSPITAL ENCOUNTER (OUTPATIENT)
Dept: PHYSICAL THERAPY | Facility: CLINIC | Age: 54
Setting detail: THERAPIES SERIES
End: 2019-06-19
Attending: FAMILY MEDICINE
Payer: OTHER MISCELLANEOUS

## 2019-06-19 PROCEDURE — 97110 THERAPEUTIC EXERCISES: CPT | Mod: GP | Performed by: PHYSICAL THERAPIST

## 2019-06-19 PROCEDURE — 97035 APP MDLTY 1+ULTRASOUND EA 15: CPT | Mod: GP | Performed by: PHYSICAL THERAPIST

## 2019-06-20 ENCOUNTER — TELEPHONE (OUTPATIENT)
Dept: FAMILY MEDICINE | Facility: CLINIC | Age: 54
End: 2019-06-20

## 2019-06-20 NOTE — TELEPHONE ENCOUNTER
Reason for Call:  Other Dr's note faxed     Detailed comments: Please fax PCP notes from 5/22/2019 - current to . Fax # [phone]    Phone Number Patient can be reached at: Other phone number: [phone]    Best Time: any     Can we leave a detailed message on this number? YES    Call taken on 6/20/2019 at 9:03 AM by Claudette Reza

## 2019-06-21 NOTE — TELEPHONE ENCOUNTER
Lm for pt to call clinic back, no JARED on file to send info to work comp. Please advise pt he will need to  copy and send himself or sign an JARED for it to be sent. Mel Lubin [facility]

## 2019-06-24 ENCOUNTER — HOSPITAL ENCOUNTER (OUTPATIENT)
Dept: PHYSICAL THERAPY | Facility: CLINIC | Age: 54
Setting detail: THERAPIES SERIES
End: 2019-06-24
Attending: FAMILY MEDICINE
Payer: OTHER MISCELLANEOUS

## 2019-06-24 DIAGNOSIS — E11.40 TYPE 2 DIABETES MELLITUS WITH DIABETIC NEUROPATHY (H): ICD-10-CM

## 2019-06-24 PROCEDURE — 97112 NEUROMUSCULAR REEDUCATION: CPT | Mod: GP | Performed by: PHYSICAL THERAPIST

## 2019-06-24 PROCEDURE — 97035 APP MDLTY 1+ULTRASOUND EA 15: CPT | Mod: GP | Performed by: PHYSICAL THERAPIST

## 2019-06-24 RX ORDER — GLIPIZIDE 10 MG/1
TABLET, FILM COATED, EXTENDED RELEASE ORAL
Qty: 180 TABLET | Refills: 3 | Status: SHIPPED | OUTPATIENT
Start: 2019-06-24 | End: 2019-07-17

## 2019-06-28 ENCOUNTER — OFFICE VISIT (OUTPATIENT)
Dept: FAMILY MEDICINE | Facility: CLINIC | Age: 54
End: 2019-06-28

## 2019-06-28 VITALS
DIASTOLIC BLOOD PRESSURE: 70 MMHG | OXYGEN SATURATION: 98 % | BODY MASS INDEX: 45.1 KG/M2 | HEART RATE: 74 BPM | HEIGHT: 70 IN | WEIGHT: 315 LBS | RESPIRATION RATE: 18 BRPM | TEMPERATURE: 98.2 F | SYSTOLIC BLOOD PRESSURE: 130 MMHG

## 2019-06-28 DIAGNOSIS — Z02.6 ENCOUNTER RELATED TO WORKER'S COMPENSATION CLAIM: Primary | ICD-10-CM

## 2019-06-28 DIAGNOSIS — S82.892D CLOSED AVULSION FRACTURE OF LEFT ANKLE WITH ROUTINE HEALING, SUBSEQUENT ENCOUNTER: ICD-10-CM

## 2019-06-28 PROCEDURE — 99207 C FRACTURE CARE IN GLOBAL PERIOD: CPT | Performed by: FAMILY MEDICINE

## 2019-06-28 ASSESSMENT — MIFFLIN-ST. JEOR: SCORE: 2278.69

## 2019-06-28 ASSESSMENT — PAIN SCALES - GENERAL: PAINLEVEL: NO PAIN (0)

## 2019-06-28 NOTE — LETTER
REPORT OF WORK COMP    [address] [id number] [phone]      PATIENT DATA    Employee Name: Kalia Boateng      : 1965     #: [id number]    Work related injury: Yes  Employer at time of injury: [employer]  Employer contact & phone: [employer]- [phone]  Employed elsewhere? No  Workers' Compensation Carrier/Managed Care Plan:      Today's date: 2019  Date of injury: 2019  Date of first visit: 2019 in ED     PROVIDER EVALUATION: Please fill in as needed.  Please give copy to employee for employer.     1. Diagnosis: Closed avulsion fracture of left ankle    2. Treatment: physical therapy to be completed next.  3. Medication: acetaminophen as needed   NOTE: When ordering a medication, [place] Rules require Work Comp or WC on prescriptions.    4. Return to work date: 2019 with no restrictions.      Maximum Medical Improvement (Date): 2019   Any Permanent Partial Disability? none    Provider comments: none    Medical Examiner: Dr. Scottie Boudreaux MD           License or registration:    Next appointment:     CC: Employer, Managed Care Plan/Payor, Patient

## 2019-06-28 NOTE — PROGRESS NOTES
"Subjective     Kalia Boateng is a 54 year old male who presents to clinic today for the following health issues:    HPI   Work comp, recheck left foot.  Wants to be cleared from his restrictions.      Patient has completed physical therapy.  Has no longer needed to wear stirrup splint or ace wrap.  Has been able to work and walk without difficulty.            Reviewed and updated as needed this visit by Provider  Tobacco  Allergies  Meds  Problems  Med Hx  Surg Hx  Fam Hx         Review of Systems   ROS COMP: Constitutional, HEENT, cardiovascular, pulmonary, gi and gu systems are negative, except as otherwise noted.      Objective    /70   Pulse 74   Temp 98.2  F (36.8  C) (Temporal)   Resp 18   Ht 1.778 m (5' 10\")   Wt 144.2 kg (318 lb)   SpO2 98%   BMI 45.63 kg/m    Body mass index is 45.63 kg/m .  Physical Exam   Right Ankle Exam     Tenderness   The patient is experiencing no tenderness.  Swelling: none    Range of Motion   Dorsiflexion: normal   Plantar flexion: normal   Eversion: normal   Inversion: normal     Muscle Strength   Dorsiflexion:  5/5  Plantar flexion:  5/5  Anterior tibial:  5/5  Posterior tibial:  5/5  Peroneal muscle:  5/5    Tests   Anterior drawer: negative    Other   Erythema: absent  Sensation: normal  Pulse: present       Left Ankle Exam   Left ankle exam is normal.                       Assessment & Plan     ASSESSMENT/ORDERS:    ICD-10-CM    1. Encounter related to worker's compensation claim [email or web address] FRACTURE CARE IN GLOBAL PERIOD   2. Closed avulsion fracture of left ankle with routine healing, subsequent encounter S82.892D FRACTURE CARE IN GLOBAL PERIOD     PLAN:  1.  Patient cleared for return to work without restriction.        BMI:   Estimated body mass index is 45.63 kg/m  as calculated from the following:    Height as of this encounter: 1.778 m (5' 10\").    Weight as of this encounter: 144.2 kg (318 lb).   Weight management plan: not addressed " today            Return for recheck if symptoms worsen or fail to improve.    Scottie Boudreaux MD  [facility]

## 2019-07-01 ENCOUNTER — TELEPHONE (OUTPATIENT)
Dept: FAMILY MEDICINE | Facility: CLINIC | Age: 54
End: 2019-07-01

## 2019-07-01 NOTE — TELEPHONE ENCOUNTER
Gabbi called asking if there was an updated form for Kalia's WC claim with [employer] insurance. There was from 6/28/19. I printed it and sent to Gabbi at [employer] at fax number [phone].

## 2019-07-02 RX ORDER — PEN NEEDLE, DIABETIC 31 GX5/16"
NEEDLE, DISPOSABLE MISCELLANEOUS
Qty: 200 EACH | Refills: 3 | Status: SHIPPED | OUTPATIENT
Start: 2019-07-02 | End: 2020-09-18

## 2019-07-11 NOTE — PROGRESS NOTES
Outpatient Physical Therapy Discharge Note     Patient: Kalia Boateng  : 1965    Beginning/End Dates of Reporting Period:  2019 to 2019    Referring Provider: Dr. Scottie Boudreaux    Therapy Diagnosis: L ankle weakness, lack of ROM, and poor stability following an avulsion fraction of the lateral maleolus.     Client Self Report: L ankle is feeling better.  No pain with work or home activities.  States knees are the biggest issue, needs total replacements.    Objective Measurements:  Objective Measure: Swelling  Details: Did not measure.  Posterior lateral malleolus swelling visible.    Objective Measure: ROM  Details: AROM of the L ankle: 7 degrees DF, 60 degrees PF, 35 degrees inversion sore, 10 degrees eversion sore    Goals:  Goal Identifier #1   Goal Description Pt will demonstrate 5-10 degrees L ankle DF painfree in order to have enough mobility to navigate stairs pain free.   Target Date 19   Date Met      Progress:     Goal Identifier #2   Goal Description Pt will demonstrate ability to go 10-12 hours at work with proper foot wear and no L ankle pain in order to return to normal work activities.   Target Date 19   Date Met  (8-9 hours starts to get sore, 2/10)   Progress:     Goal Identifier #3   Goal Description Pt will report >75% on the LEFS demonstrating improved mobility and function with less pain during daily activities.   Target Date 19   Date Met      Progress:       Progress Toward Goals:   Not assessed this period.  Pt was not seen for a final session. He was scheduled with this writer on 2019, however, called and canceled all remaining appts stating that the doctor had removed his restrictions and is doing good. This writer has not seen this patient, but is discharging patient on behalf of primary PT due to that therapist on [facility] at this time.    Plan:  Discharge from therapy.    Discharge:    Reason for Discharge: Patient has met all goals.    Equipment  Issued: N/A    Discharge Plan: Patient to continue home program.    Thank you for your referral,     Neelam Bravo, PT, DPT  [phone]  [facility]

## 2019-07-11 NOTE — ADDENDUM NOTE
Encounter addended by: Neelam Bravo, PT on: 7/11/2019 1:27 PM   Actions taken: Sign clinical note, Episode resolved

## 2019-07-17 ENCOUNTER — TELEPHONE (OUTPATIENT)
Dept: EMERGENCY MEDICINE | Facility: CLINIC | Age: 54
End: 2019-07-17

## 2019-07-17 ENCOUNTER — OFFICE VISIT (OUTPATIENT)
Dept: ENDOCRINOLOGY | Facility: CLINIC | Age: 54
End: 2019-07-17
Payer: COMMERCIAL

## 2019-07-17 ENCOUNTER — HOSPITAL ENCOUNTER (EMERGENCY)
Facility: CLINIC | Age: 54
Discharge: HOME | End: 2019-07-17
Attending: EMERGENCY MEDICINE | Admitting: EMERGENCY MEDICINE
Payer: COMMERCIAL

## 2019-07-17 VITALS
BODY MASS INDEX: 44.1 KG/M2 | WEIGHT: 315 LBS | TEMPERATURE: 97.4 F | RESPIRATION RATE: 20 BRPM | HEART RATE: 52 BPM | HEIGHT: 71 IN | OXYGEN SATURATION: 94 % | DIASTOLIC BLOOD PRESSURE: 51 MMHG | SYSTOLIC BLOOD PRESSURE: 119 MMHG

## 2019-07-17 VITALS
SYSTOLIC BLOOD PRESSURE: 113 MMHG | WEIGHT: 315 LBS | OXYGEN SATURATION: 96 % | DIASTOLIC BLOOD PRESSURE: 62 MMHG | HEART RATE: 71 BPM | BODY MASS INDEX: 45.57 KG/M2

## 2019-07-17 DIAGNOSIS — Z79.4 TYPE 2 DIABETES MELLITUS WITH DIABETIC NEPHROPATHY, WITH LONG-TERM CURRENT USE OF INSULIN (H): ICD-10-CM

## 2019-07-17 DIAGNOSIS — E11.21 TYPE 2 DIABETES MELLITUS WITH DIABETIC NEPHROPATHY, WITH LONG-TERM CURRENT USE OF INSULIN (H): ICD-10-CM

## 2019-07-17 DIAGNOSIS — E04.2 MULTIPLE THYROID NODULES: ICD-10-CM

## 2019-07-17 DIAGNOSIS — E55.9 VITAMIN D INSUFFICIENCY: ICD-10-CM

## 2019-07-17 DIAGNOSIS — E66.01 MORBID OBESITY (H): Primary | ICD-10-CM

## 2019-07-17 DIAGNOSIS — E87.5 HYPERKALEMIA: ICD-10-CM

## 2019-07-17 LAB
ALBUMIN SERPL-MCNC: 4.1 G/DL (ref 3.4–5)
ALP SERPL-CCNC: 153 U/L (ref 40–150)
ALT SERPL W P-5'-P-CCNC: 57 U/L (ref 0–70)
ANION GAP SERPL CALCULATED.3IONS-SCNC: 5 MMOL/L (ref 3–14)
AST SERPL W P-5'-P-CCNC: 35 U/L (ref 0–45)
BILIRUB SERPL-MCNC: 0.3 MG/DL (ref 0.2–1.3)
BUN SERPL-MCNC: 35 MG/DL (ref 7–30)
CALCIUM SERPL-MCNC: 9.3 MG/DL (ref 8.5–10.1)
CHLORIDE SERPL-SCNC: 108 MMOL/L (ref 94–109)
CO2 SERPL-SCNC: 24 MMOL/L (ref 20–32)
CREAT SERPL-MCNC: 1.46 MG/DL (ref 0.66–1.25)
CREAT UR-MCNC: 156 MG/DL
GFR SERPL CREATININE-BSD FRML MDRD: 53 ML/MIN/{1.73_M2}
GLUCOSE SERPL-MCNC: 108 MG/DL (ref 70–99)
HBA1C MFR BLD: 7.2 % (ref 0–5.6)
HCT VFR BLD AUTO: 37.9 % (ref 40–53)
MICROALBUMIN UR-MCNC: 27 MG/L
MICROALBUMIN/CREAT UR: 17.31 MG/G CR (ref 0–17)
POTASSIUM SERPL-SCNC: 5.9 MMOL/L (ref 3.4–5.3)
POTASSIUM SERPL-SCNC: 6.7 MMOL/L (ref 3.4–5.3)
PROT SERPL-MCNC: 8.2 G/DL (ref 6.8–8.8)
SODIUM SERPL-SCNC: 137 MMOL/L (ref 133–144)
TSH SERPL DL<=0.005 MIU/L-ACNC: 0.87 MU/L (ref 0.4–4)

## 2019-07-17 PROCEDURE — 85014 HEMATOCRIT: CPT | Performed by: INTERNAL MEDICINE

## 2019-07-17 PROCEDURE — 82306 VITAMIN D 25 HYDROXY: CPT | Performed by: INTERNAL MEDICINE

## 2019-07-17 PROCEDURE — 82043 UR ALBUMIN QUANTITATIVE: CPT | Performed by: INTERNAL MEDICINE

## 2019-07-17 PROCEDURE — 84132 ASSAY OF SERUM POTASSIUM: CPT | Performed by: EMERGENCY MEDICINE

## 2019-07-17 PROCEDURE — 25000128 H RX IP 250 OP 636: Performed by: EMERGENCY MEDICINE

## 2019-07-17 PROCEDURE — 84443 ASSAY THYROID STIM HORMONE: CPT | Performed by: INTERNAL MEDICINE

## 2019-07-17 PROCEDURE — 83036 HEMOGLOBIN GLYCOSYLATED A1C: CPT | Performed by: INTERNAL MEDICINE

## 2019-07-17 PROCEDURE — 99284 EMERGENCY DEPT VISIT MOD MDM: CPT | Mod: 25 | Performed by: EMERGENCY MEDICINE

## 2019-07-17 PROCEDURE — 99214 OFFICE O/P EST MOD 30 MIN: CPT | Performed by: INTERNAL MEDICINE

## 2019-07-17 PROCEDURE — 80053 COMPREHEN METABOLIC PANEL: CPT | Performed by: INTERNAL MEDICINE

## 2019-07-17 PROCEDURE — 96374 THER/PROPH/DIAG INJ IV PUSH: CPT | Performed by: EMERGENCY MEDICINE

## 2019-07-17 PROCEDURE — 36415 COLL VENOUS BLD VENIPUNCTURE: CPT | Performed by: INTERNAL MEDICINE

## 2019-07-17 PROCEDURE — 93010 ELECTROCARDIOGRAM REPORT: CPT | Mod: Z6 | Performed by: EMERGENCY MEDICINE

## 2019-07-17 PROCEDURE — 93005 ELECTROCARDIOGRAM TRACING: CPT | Performed by: EMERGENCY MEDICINE

## 2019-07-17 RX ORDER — FUROSEMIDE 10 MG/ML
40 INJECTION INTRAMUSCULAR; INTRAVENOUS ONCE
Status: COMPLETED | OUTPATIENT
Start: 2019-07-17 | End: 2019-07-17

## 2019-07-17 RX ORDER — FUROSEMIDE 40 MG
40 TABLET ORAL DAILY
Qty: 2 TABLET | Refills: 0 | Status: SHIPPED | OUTPATIENT
Start: 2019-07-17 | End: 2019-07-19

## 2019-07-17 RX ADMIN — FUROSEMIDE 40 MG: 10 INJECTION, SOLUTION INTRAVENOUS at 19:16

## 2019-07-17 ASSESSMENT — MIFFLIN-ST. JEOR: SCORE: 2290.03

## 2019-07-17 NOTE — NURSING NOTE
Kalia Boateng's goals for this visit include:   Chief Complaint   Patient presents with     Diabetes     He requests these members of his care team be copied on today's visit information: Yes    PCP: Scottie Boudreaux    Referring Provider:  Scottie Boudreaux MD  [address] [place] [address]    /62 (BP Location: Left arm, Patient Position: Sitting, Cuff Size: Adult Large)   Pulse 71   Wt 144.1 kg (317 lb 9.6 oz)   SpO2 96%   BMI 45.57 kg/m      Do you need any medication refills at today's visit? Yes

## 2019-07-17 NOTE — ED AVS SNAPSHOT
[facility] Emergency Department  [address] [place] [address]  Phone:  [phone]  Fax:  [phone]                                    Kalia Boateng   MRN: [id number]    Department:  [facility] Emergency Department   Date of Visit:  7/17/2019           After Visit Summary Signature Page    I have received my discharge instructions, and my questions have been answered. I have discussed any challenges I see with this plan with the nurse or doctor.    ..........................................................................................................................................  Patient/Patient Representative Signature      ..........................................................................................................................................  Patient Representative Print Name and Relationship to Patient    ..................................................               ................................................  Date                                   Time    ..........................................................................................................................................  Reviewed by Signature/Title    ...................................................              ..............................................  Date                                               Time          22EPIC Rev 08/18
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
## 2019-07-17 NOTE — ED TRIAGE NOTES
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
## 2019-07-18 NOTE — DISCHARGE INSTRUCTIONS
Hold your spironolactone and to you follow-up with your doctor on Friday.  You can take Lasix tomorrow.  Please avoid eating bananas or other potassium rich foods until recheck.

## 2019-07-18 NOTE — TELEPHONE ENCOUNTER
Patient is in need of an ER follow up appointment per Dr. Ramirez on Friday, July 19 to recheck potassium.     Thank you!

## 2019-07-18 NOTE — TELEPHONE ENCOUNTER
Patient  Called back and I gave him the message and scheduled an appt for 7/19/19 at 830am with Dr. Boudreaux.       Thank you,  Zenaida Oneill   for [facility]

## 2019-07-18 NOTE — TELEPHONE ENCOUNTER
Dr. Boudreaux please advise on a time that you are able to see this patient tomorrow 7/19.  Tammy Marques, CMA

## 2019-07-18 NOTE — ED PROVIDER NOTES
History     Chief Complaint   Patient presents with     Abnormal Labs     HPI  Kalia Boateng is a 54 year old male who presents after recommendation from the diabetic educator as his potassium which was drawn earlier today was elevated at 6.7.  Remainder of his blood work did show mild elevated creatinine at 1.46 and a BUN of 35.  He is a type II diabetic on insulin.  Patient has hypertension.  He has had proteinuria in the past.  Patient is on spironolactone as a diuretic.  Takes 50 mg twice daily.  Also states he eats bananas daily.  He is not on potassium supplementation.  Patient is a former smoker.  He was unaware of problems with potassium as he is asymptomatic and has no complaints today.  Currently denies lightheadedness or generalized weakness.  No chest pain or shortness of breath.  No nausea or vomiting, diarrhea, or abdominal pain.  No recent fall or crush injury.    Allergies:  Allergies   Allergen Reactions     No Known Drug Allergies        Problem List:    Patient Active Problem List    Diagnosis Date Noted     Type 2 diabetes mellitus with diabetic nephropathy, with long-term current use of insulin (H) 12/04/2017     Priority: Medium     Multiple thyroid nodules 12/04/2017     Priority: Medium     Strain of neck muscle, subsequent encounter 08/24/2016     Priority: Medium     CAD (coronary artery disease) 08/16/2016     Priority: Medium     2010 coronary CT angiogram, which was a technically difficult study but was concerning for obstructive CAD.  Subsequent coronary angiogram showed moderate narrowing of diagonal branch and very terminal branch of circumflex vessel.         Sleep-related hypoventilation 07/14/2016     Priority: Medium     CYRIL (obstructive sleep apnea) Severe 07/12/2016     Priority: Medium     Disc disorder of cervical region 03/20/2015     Priority: Medium     Work Comp injury       Counseling regarding advanced directives 04/21/2014     Priority: Medium     Forms given to  patient on 4/21/14.  Tamiko Degrootmell SONJA         Essential hypertension with goal blood pressure less than 140/90 05/25/2011     Priority: Medium     Osteoarthritis 07/10/2008     Priority: Medium     Idiopathic chronic gout of multiple sites without tophus 01/30/2006     Priority: Medium     Problem list name updated by automated process. Provider to review       Proteinuria 09/24/2004     Priority: Medium     Morbid obesity, unspecified obesity type (H) 08/06/2004     Priority: Medium        Past Medical History:    Past Medical History:   Diagnosis Date     AC joint arthropathy 11/6/2015     Closed fracture of shaft of left humerus 7/27/2015     Closed fracture of shaft of left humerus with routine healing 10/9/2015     Diabetic eye exam (H) 09/07/11     Dysfunction of left rotator cuff 10/9/2015     Essential hypertension      Gout      NONSPECIFIC MEDICAL HISTORY 1980     CYRIL (obstructive sleep apnea) 2011     Osteoarthrosis, unspecified whether generalized or localized, lower leg      Other and unspecified hyperlipidemia      Proteinuria      Type II or unspecified type diabetes mellitus without mention of complication, not stated as uncontrolled        Past Surgical History:    Past Surgical History:   Procedure Laterality Date     EXCISE LESION FACE Right 1/24/2017    Procedure: EXCISE LESION FACE;  Surgeon: Bhanu Graham MD;  Location:  OR     HC ARTHROTOMY W/OPEN MENISCUS REPAIR  12/05/2002    1)Arthroscopic partial medial meniscectomy, left knee.  2)Chondroplasty, medial femoral condyle, left knee.  3)Debridement of medial plica, arthroscopic, left knee.      KNEE SCOPE, DIAGNOSTIC      Arthroscopy, Knee      KNEE SCOPE,MED/LAT MENISECTOMY  1/26/2005     Arthroscopic partial medial meniscectomy, right knee.     HC REPAIR ROTATOR CUFF,ACUTE  1990's    Rt Rotator Cuff Repair     HC VASECTOMY UNILAT/BILAT W POSTOP SEMEN  1991    Vasectomy     INJECT JOINT SACROILIAC Bilateral 6/24/2015     "Procedure: INJECT JOINT SACROILIAC;  Surgeon: James Leahy MD;  Location: [facility]     JOINT REPLACEMTN, KNEE RT/LT  09    Medial unicompartmental arthroplasty, left knee.       Family History:    Family History   Problem Relation Age of Onset     Lipids Mother      Hypertension Mother      Cancer Paternal Uncle         x3     Heart Disease Father        Social History:  Marital Status:   [2]  Social History     Tobacco Use     Smoking status: Former Smoker     Packs/day: 0.50     Years: 28.00     Pack years: 14.00     Types: Cigarettes     Last attempt to quit: 2010     Years since quittin.5     Smokeless tobacco: Former User     Types: Chew     Quit date: 2009   Substance Use Topics     Alcohol use: Yes     Alcohol/week: 1.0 oz     Types: 2 Cans of beer per week     Drug use: No        Medications:      furosemide (LASIX) 40 MG tablet   Acetaminophen (TYLENOL PO)   allopurinol (ZYLOPRIM) 300 MG tablet   amLODIPine (NORVASC) 5 MG tablet   aspirin 81 MG EC tablet   atorvastatin (LIPITOR) 40 MG tablet   B-D U/F 31G X 8 MM insulin pen needle   blood glucose (ACCU-CHEK GUIDE) test strip   blood glucose monitoring (ACCU-CHEK FASTCLIX) lancets   blood glucose monitoring (ONE TOUCH DELICA) lancets   chlorthalidone (HYGROTON) 25 MG tablet   colchicine 0.6 MG tablet   doxazosin (CARDURA) 4 MG tablet   dulaglutide (TRULICITY) 1.5 MG/0.5ML pen   insulin aspart (NOVOLOG FLEXPEN) 100 UNIT/ML pen   insulin degludec (TRESIBA FLEXTOUCH) 200 UNIT/ML pen   insulin pen needle (B-D U/F) 31G X 8 MM   lisinopril (PRINIVIL/ZESTRIL) 40 MG tablet   meloxicam (MOBIC) 15 MG tablet   metFORMIN (GLUCOPHAGE-XR) 500 MG 24 hr tablet   order for DME   ORDER FOR DME   spironolactone (ALDACTONE) 50 MG tablet         Review of Systems all other systems reviewed and are negative.    Physical Exam   BP: 139/74  Pulse: 52  Temp: 97.4  F (36.3  C)  Resp: 15  Height: 180.3 cm (5' 11\")  Weight: 143.8 kg (317 lb)  SpO2: 97 " %      Physical Exam alert cooperative and pleasant male in no acute distress.  HEENT shows no proptosis.  No facial asymmetry or droop.  No thyromegaly.  Neck is supple.  Lungs are clear without adventitious sounds.  Cardiac auscultation is normal.  He has an obese but benign abdomen.  No leg edema or swelling.    ED Course        Procedures               EKG Interpretation:      Interpreted by Jeremy Ramirez  Time reviewed: 19:25  Symptoms at time of EKG: None   Rhythm: normal sinus   Rate: Bradycardia  Axis: Normal  Ectopy: premature atrial contraction  Conduction: normal  ST Segments/ T Waves: No acute ischemic changes  Q Waves: none  Comparison to prior: Unchanged from 8/16    Clinical Impression: sinus bradycardia                Critical Care time:  none               Results for orders placed or performed during the hospital encounter of 07/17/19 (from the past 24 hour(s))   Potassium   Result Value Ref Range    Potassium 5.9 (H) 3.4 - 5.3 mmol/L       Medications   furosemide (LASIX) injection 40 mg (40 mg Intravenous Given 7/17/19 1916)     Repeat potassium was still elevated at 5.9.  Suspects some hemolysis.  After consultation with pharmacy we will give him IV Lasix and and hold his spironolactone.  Avoid potassium rich foods for the next 48 hours.  Assessments & Plan (with Medical Decision Making)   Kalia Boateng is a 54 year old male who presents after recommendation from the diabetic educator as his potassium which was drawn earlier today was elevated at 6.7.  Remainder of his blood work did show mild elevated creatinine at 1.46 and a BUN of 35.  He is a type II diabetic on insulin.  Patient has hypertension.  He has had proteinuria in the past.  Patient is on spironolactone as a diuretic.  Takes 50 mg twice daily.  Also states he eats bananas daily.  He is not on potassium supplementation.  Patient is a former smoker.  He was unaware of problems with potassium as he is asymptomatic and has no  complaints today.  Currently denies lightheadedness or generalized weakness.  No chest pain or shortness of breath.  No nausea or vomiting, diarrhea, or abdominal pain.  No recent fall or crush injury.  On presentation patient was bradycardic but has been some the past.  He was not hypotensive.  Afebrile and not hypoxic.  His exam otherwise was unremarkable.  EKG showed a sinus elevation no change from previous EKG done on 8/16. He did not have peaked T waves and did not have any QRS widening.  He was given IV Lasix here in the department.  We will have him hold his spironolactone until recheck on Friday in the clinic.  That time they can repeat his potassium.  Also asked him to avoid potassium rich foods including his daily bananas.  Handout on hyperkalemia is provided and reasons to return for reassessment discussed.  2 pills of Lasix is provided for the next 2 days.  I have reviewed the nursing notes.    I have reviewed the findings, diagnosis, plan and need for follow up with the patient.          Medication List      Started    furosemide 40 MG tablet  Commonly known as:  LASIX  40 mg, Oral, DAILY            Final diagnoses:   Hyperkalemia       7/17/2019   [facility] EMERGENCY DEPARTMENT     Jeremy Ramirez MD  07/17/19 1943

## 2019-07-19 ENCOUNTER — TELEPHONE (OUTPATIENT)
Dept: ENDOCRINOLOGY | Facility: CLINIC | Age: 54
End: 2019-07-19

## 2019-07-19 ENCOUNTER — OFFICE VISIT (OUTPATIENT)
Dept: FAMILY MEDICINE | Facility: CLINIC | Age: 54
End: 2019-07-19
Payer: COMMERCIAL

## 2019-07-19 VITALS
RESPIRATION RATE: 24 BRPM | SYSTOLIC BLOOD PRESSURE: 112 MMHG | DIASTOLIC BLOOD PRESSURE: 60 MMHG | OXYGEN SATURATION: 100 % | HEART RATE: 89 BPM | TEMPERATURE: 96 F | BODY MASS INDEX: 44.16 KG/M2 | WEIGHT: 315 LBS

## 2019-07-19 DIAGNOSIS — E87.5 HYPERKALEMIA: Primary | ICD-10-CM

## 2019-07-19 DIAGNOSIS — E11.21 TYPE 2 DIABETES MELLITUS WITH DIABETIC NEPHROPATHY, WITH LONG-TERM CURRENT USE OF INSULIN (H): ICD-10-CM

## 2019-07-19 DIAGNOSIS — I10 ESSENTIAL HYPERTENSION WITH GOAL BLOOD PRESSURE LESS THAN 140/90: ICD-10-CM

## 2019-07-19 DIAGNOSIS — E11.43 TYPE 2 DIABETES MELLITUS WITH DIABETIC AUTONOMIC NEUROPATHY, WITH LONG-TERM CURRENT USE OF INSULIN (H): ICD-10-CM

## 2019-07-19 DIAGNOSIS — Z79.4 TYPE 2 DIABETES MELLITUS WITH DIABETIC AUTONOMIC NEUROPATHY, WITH LONG-TERM CURRENT USE OF INSULIN (H): ICD-10-CM

## 2019-07-19 DIAGNOSIS — Z79.4 TYPE 2 DIABETES MELLITUS WITH DIABETIC NEPHROPATHY, WITH LONG-TERM CURRENT USE OF INSULIN (H): ICD-10-CM

## 2019-07-19 LAB
ANION GAP SERPL CALCULATED.3IONS-SCNC: 6 MMOL/L (ref 3–14)
BUN SERPL-MCNC: 51 MG/DL (ref 7–30)
CALCIUM SERPL-MCNC: 9.1 MG/DL (ref 8.5–10.1)
CHLORIDE SERPL-SCNC: 106 MMOL/L (ref 94–109)
CO2 SERPL-SCNC: 24 MMOL/L (ref 20–32)
CREAT SERPL-MCNC: 1.46 MG/DL (ref 0.66–1.25)
GFR SERPL CREATININE-BSD FRML MDRD: 53 ML/MIN/{1.73_M2}
GLUCOSE SERPL-MCNC: 173 MG/DL (ref 70–99)
HBA1C MFR BLD: 6.9 % (ref 0–5.6)
POTASSIUM SERPL-SCNC: 5 MMOL/L (ref 3.4–5.3)
SODIUM SERPL-SCNC: 136 MMOL/L (ref 133–144)

## 2019-07-19 PROCEDURE — 36415 COLL VENOUS BLD VENIPUNCTURE: CPT | Performed by: FAMILY MEDICINE

## 2019-07-19 PROCEDURE — 83036 HEMOGLOBIN GLYCOSYLATED A1C: CPT | Performed by: FAMILY MEDICINE

## 2019-07-19 PROCEDURE — 99214 OFFICE O/P EST MOD 30 MIN: CPT | Performed by: FAMILY MEDICINE

## 2019-07-19 PROCEDURE — 80048 BASIC METABOLIC PNL TOTAL CA: CPT | Performed by: FAMILY MEDICINE

## 2019-07-19 NOTE — TELEPHONE ENCOUNTER
Will forward to BRYAN Marvin to refill for 90 day supply.    Yessenia Wallis LPN  Diabetes Clinic Coordinator   Adult Endocrinology and Pediatric Specialty Clinics  [facility]

## 2019-07-19 NOTE — Clinical Note
Please see my note on this and let me or patient know if you have any additional input or ideas on what to do for patient. Thanks,Scottie Boudreaux MD

## 2019-07-19 NOTE — TELEPHONE ENCOUNTER
[facility] Call Center    Phone Message    May a detailed message be left on voicemail: yes    Reason for Call: Medication Question or concern regarding medication   Prescription Clarification  Name of Medication: insulin degludec (TRESIBA FLEXTOUCH) 200 UNIT/ML pen    Prescribing Provider: Dr. Ferreira   Pharmacy: [facility] mail order   What on the order needs clarification? Pharmacy states they can only fill this medication 90 days at a time.           Action Taken: Message routed to:  Adult Clinics: Endocrinology p [id number]

## 2019-07-19 NOTE — PROGRESS NOTES
Subjective     Kalia Boateng is a 54 year old male who presents to clinic today for the following health issues:    HPI   ED/UC Followup:    Facility:  [facility]  //Date of visit: 7/17/19  Reason for visit: Hyperkalemia  Current Status: doing ok today, rechecking potassium             Seen in ER 2 days ago for hyperkalemia.  Was treated and discharged on furosemide and told to hold spironolactone.      Patient managed by endocrinologist for diabetes.  Last office visit notes reviewed.  Patient had normal creatinine 11 months ago when his spironolactone was increased from 25 mg twice daily to 50 mg twice daily.  He was instructed to follow-up for creatinine and potassium 1-2 weeks after his medication increased, but he did not have it checked until this resent check.  This is when his potassium was found to be high and and his creatinine was found to be elevated as well.  He has been asymptomatic throughout this.    He has nephropathy and has had this for a long time.  Blood pressure has been good over the last few visits.    BP Readings from Last 6 Encounters:   07/19/19 112/60   07/17/19 119/51   07/17/19 113/62   06/28/19 130/70   05/28/19 134/72   05/22/19 137/89       Reviewed and updated as needed this visit by Provider  Tobacco  Allergies  Meds  Problems  Med Hx  Surg Hx  Fam Hx         Review of Systems   ROS COMP: Constitutional, HEENT, cardiovascular, pulmonary, GI, , musculoskeletal, neuro, skin, endocrine and psych systems are negative, except as otherwise noted.      Objective    /60   Pulse 89   Temp 96  F (35.6  C) (Temporal)   Resp 24   Wt 143.6 kg (316 lb 9.6 oz)   SpO2 100%   BMI 44.16 kg/m    Body mass index is 44.16 kg/m .  Physical Exam   Constitutional: He appears well-developed and well-nourished.   Cardiovascular: Normal rate, regular rhythm, S1 normal, S2 normal and normal heart sounds.   No murmur heard.  Pulmonary/Chest: Effort normal and breath sounds normal. No  "respiratory distress. He has no wheezes. He has no rhonchi. He has no rales.   Musculoskeletal: He exhibits edema (bilateral 2+ lower extremity).   Lower extremity varicose veins bilaterally   Neurological: He is alert.          Diagnostic Test Results:  Labs reviewed in Epic  Results for orders placed or performed in visit on 07/19/19 (from the past 24 hour(s))   Basic metabolic panel   Result Value Ref Range    Sodium 136 133 - 144 mmol/L    Potassium 5.0 3.4 - 5.3 mmol/L    Chloride 106 94 - 109 mmol/L    Carbon Dioxide 24 20 - 32 mmol/L    Anion Gap 6 3 - 14 mmol/L    Glucose 173 (H) 70 - 99 mg/dL    Urea Nitrogen 51 (H) 7 - 30 mg/dL    Creatinine 1.46 (H) 0.66 - 1.25 mg/dL    GFR Estimate 53 (L) >60 mL/min/[1.73_m2]    GFR Estimate If Black 61 >60 mL/min/[1.73_m2]    Calcium 9.1 8.5 - 10.1 mg/dL   Hemoglobin A1c   Result Value Ref Range    Hemoglobin A1C 6.9 (H) 0 - 5.6 %           Assessment & Plan     ASSESSMENT/ORDERS:    ICD-10-CM    1. Hyperkalemia E87.5 Basic metabolic panel     Basic metabolic panel     Basic metabolic panel   2. Type 2 diabetes mellitus with diabetic autonomic neuropathy, with long-term current use of insulin (H) E11.43 Hemoglobin A1c    Z79.4 Basic metabolic panel   3. Essential hypertension with goal blood pressure less than 140/90 I10 Basic metabolic panel     PLAN:  1.  Will have him continue to hold spironolactone and will have him discontinue furosemide.  Repeat BMP and blood pressure check in 6 days.  Will let endocrinologist know as well and see if she has any additional input on plan of care.  May be able to restart spironolactone at lower dose depending on his repeat labs, but may also need other medication adjustments depending on labs and/or recheck blood pressure.     BMI:   Estimated body mass index is 44.16 kg/m  as calculated from the following:    Height as of 7/17/19: 1.803 m (5' 11\").    Weight as of this encounter: 143.6 kg (316 lb 9.6 oz).               Return in " about 1 week (around 7/26/2019) for lab only visit and nurse blood pressure recheck.    Scottie Boudreaux MD  [facility]

## 2019-07-22 ENCOUNTER — TELEPHONE (OUTPATIENT)
Dept: ENDOCRINOLOGY | Facility: CLINIC | Age: 54
End: 2019-07-22

## 2019-07-22 LAB
DEPRECATED CALCIDIOL+CALCIFEROL SERPL-MC: <25 UG/L (ref 20–75)
VITAMIN D2 SERPL-MCNC: <5 UG/L
VITAMIN D3 SERPL-MCNC: 20 UG/L

## 2019-07-22 NOTE — TELEPHONE ENCOUNTER
Health Call Center    Phone Message    May a detailed message be left on voicemail: yes    Reason for Call: Medication Question or concern regarding medication   Prescription Clarification  Name of Medication: insulin degludec (TRESIBA FLEXTOUCH) 200 UNIT/ML pen [[id number]] (Order [id number])   Prescribing Provider: Dr. Ferreira   Pharmacy: [facility]   What on the order needs clarification? Pharmacy states they received two separate prescriptions with different directions. Please call [facility] back to clarify at [phone].    Reference # [id number]    Action Taken: Message routed to:  Adult Clinics: Endocrinology p [id number]

## 2019-07-22 NOTE — TELEPHONE ENCOUNTER
Prescription completed 7/19/19 by diabetes educator, Madison Douglas. Will send to Madison to review further.       Selma Stevenson, RN  Endocrine Care Coordinator  [facility]

## 2019-07-23 RX ORDER — CHOLECALCIFEROL (VITAMIN D3) 50 MCG
2000 TABLET ORAL DAILY
Qty: 90 TABLET | Refills: 3 | Status: SHIPPED | OUTPATIENT
Start: 2019-07-23 | End: 2020-11-05

## 2019-07-23 NOTE — TELEPHONE ENCOUNTER
Cde spoke with [facility] [employer] rep. Confirmed correct dosage of Tresiba is now 60 units at bedtime.    Madison Douglas, RN, BSN, CDE   [facility]

## 2019-07-25 ENCOUNTER — ALLIED HEALTH/NURSE VISIT (OUTPATIENT)
Dept: FAMILY MEDICINE | Facility: CLINIC | Age: 54
End: 2019-07-25
Payer: COMMERCIAL

## 2019-07-25 VITALS — DIASTOLIC BLOOD PRESSURE: 70 MMHG | SYSTOLIC BLOOD PRESSURE: 138 MMHG | HEART RATE: 58 BPM

## 2019-07-25 DIAGNOSIS — E11.43 TYPE 2 DIABETES MELLITUS WITH DIABETIC AUTONOMIC NEUROPATHY, WITH LONG-TERM CURRENT USE OF INSULIN (H): ICD-10-CM

## 2019-07-25 DIAGNOSIS — I10 ESSENTIAL HYPERTENSION WITH GOAL BLOOD PRESSURE LESS THAN 140/90: ICD-10-CM

## 2019-07-25 DIAGNOSIS — E87.5 HYPERKALEMIA: ICD-10-CM

## 2019-07-25 DIAGNOSIS — I10 ESSENTIAL HYPERTENSION WITH GOAL BLOOD PRESSURE LESS THAN 140/90: Primary | ICD-10-CM

## 2019-07-25 DIAGNOSIS — I10 ESSENTIAL HYPERTENSION: ICD-10-CM

## 2019-07-25 DIAGNOSIS — Z79.4 TYPE 2 DIABETES MELLITUS WITH DIABETIC AUTONOMIC NEUROPATHY, WITH LONG-TERM CURRENT USE OF INSULIN (H): ICD-10-CM

## 2019-07-25 LAB
ANION GAP SERPL CALCULATED.3IONS-SCNC: 6 MMOL/L (ref 3–14)
BUN SERPL-MCNC: 28 MG/DL (ref 7–30)
CALCIUM SERPL-MCNC: 9.2 MG/DL (ref 8.5–10.1)
CHLORIDE SERPL-SCNC: 107 MMOL/L (ref 94–109)
CO2 SERPL-SCNC: 28 MMOL/L (ref 20–32)
CREAT SERPL-MCNC: 1.16 MG/DL (ref 0.66–1.25)
GFR SERPL CREATININE-BSD FRML MDRD: 70 ML/MIN/{1.73_M2}
GLUCOSE SERPL-MCNC: 154 MG/DL (ref 70–99)
POTASSIUM SERPL-SCNC: 3.8 MMOL/L (ref 3.4–5.3)
SODIUM SERPL-SCNC: 141 MMOL/L (ref 133–144)

## 2019-07-25 PROCEDURE — 36415 COLL VENOUS BLD VENIPUNCTURE: CPT | Performed by: FAMILY MEDICINE

## 2019-07-25 PROCEDURE — 80048 BASIC METABOLIC PNL TOTAL CA: CPT | Performed by: FAMILY MEDICINE

## 2019-07-25 PROCEDURE — 99207 ZZC NO CHARGE NURSE ONLY: CPT

## 2019-07-25 RX ORDER — SPIRONOLACTONE 25 MG/1
25 TABLET ORAL DAILY
Qty: 90 TABLET | Refills: 3 | Status: SHIPPED | OUTPATIENT
Start: 2019-07-25 | End: 2020-10-19

## 2019-07-25 NOTE — PROGRESS NOTES
Kalia Boateng is a 54 year old patient who comes in today for a Blood Pressure check.  Initial BP:  /70   Pulse 58      58  Disposition: provider notified while patient in the clinic as when checking his blood pressure it sounded abnormal pulse was taken and it 58 and sounded abnormal. Spoke to Dr Boudreaux and he stated his bp is okay and we looked into his pulse and his last EKG showed PAC and was normal for patient. Dr Boudreaux stated he will wait for patients labs to come back to decide if there will be any changes.  Kristin Salmon MA 7/25/2019

## 2019-07-25 NOTE — RESULT ENCOUNTER NOTE
Kalia,  Your results show that your potassium is normal again but with your blood pressure being a little high, I recommend that you go back on the 25 mg daily of spironolactone.  I sent the prescription to your mail order pharmacy on file.  Recheck your blood pressure with a nurse visit and come back to lab for recheck on your labs about a week after you start the new dose of spironolactone.  Please let me know if you have any questions.    Sincerely,  Dr. Boudreaux

## 2019-09-27 ENCOUNTER — HEALTH MAINTENANCE LETTER (OUTPATIENT)
Age: 54
End: 2019-09-27

## 2019-10-19 DIAGNOSIS — Z79.4 TYPE 2 DIABETES MELLITUS WITH DIABETIC AUTONOMIC NEUROPATHY, WITH LONG-TERM CURRENT USE OF INSULIN (H): ICD-10-CM

## 2019-10-19 DIAGNOSIS — E11.43 TYPE 2 DIABETES MELLITUS WITH DIABETIC AUTONOMIC NEUROPATHY, WITH LONG-TERM CURRENT USE OF INSULIN (H): ICD-10-CM

## 2019-10-19 DIAGNOSIS — I10 ESSENTIAL HYPERTENSION: ICD-10-CM

## 2019-10-19 LAB
ANION GAP SERPL CALCULATED.3IONS-SCNC: 5 MMOL/L (ref 3–14)
BUN SERPL-MCNC: 27 MG/DL (ref 7–30)
CALCIUM SERPL-MCNC: 9 MG/DL (ref 8.5–10.1)
CHLORIDE SERPL-SCNC: 108 MMOL/L (ref 94–109)
CO2 SERPL-SCNC: 28 MMOL/L (ref 20–32)
CREAT SERPL-MCNC: 1.12 MG/DL (ref 0.66–1.25)
GFR SERPL CREATININE-BSD FRML MDRD: 73 ML/MIN/{1.73_M2}
GLUCOSE SERPL-MCNC: 223 MG/DL (ref 70–99)
HBA1C MFR BLD: 8.6 % (ref 0–5.6)
POTASSIUM SERPL-SCNC: 4 MMOL/L (ref 3.4–5.3)
SODIUM SERPL-SCNC: 141 MMOL/L (ref 133–144)

## 2019-10-19 PROCEDURE — 80048 BASIC METABOLIC PNL TOTAL CA: CPT | Performed by: FAMILY MEDICINE

## 2019-10-19 PROCEDURE — 83036 HEMOGLOBIN GLYCOSYLATED A1C: CPT | Mod: QW | Performed by: FAMILY MEDICINE

## 2019-10-19 PROCEDURE — 36415 COLL VENOUS BLD VENIPUNCTURE: CPT | Performed by: FAMILY MEDICINE

## 2019-11-19 DIAGNOSIS — I10 ESSENTIAL HYPERTENSION WITH GOAL BLOOD PRESSURE LESS THAN 140/90: ICD-10-CM

## 2019-11-19 RX ORDER — CHLORTHALIDONE 25 MG/1
TABLET ORAL
Qty: 90 TABLET | Refills: 1 | Status: SHIPPED | OUTPATIENT
Start: 2019-11-19 | End: 2020-02-15

## 2019-11-26 DIAGNOSIS — M1A.09X0 IDIOPATHIC CHRONIC GOUT OF MULTIPLE SITES WITHOUT TOPHUS: ICD-10-CM

## 2019-11-26 DIAGNOSIS — E11.43 TYPE 2 DIABETES MELLITUS WITH AUTONOMIC NEUROPATHY (H): ICD-10-CM

## 2019-11-26 RX ORDER — ALLOPURINOL 300 MG/1
TABLET ORAL
Qty: 90 TABLET | Refills: 1 | Status: SHIPPED | OUTPATIENT
Start: 2019-11-26 | End: 2020-05-07

## 2019-11-26 RX ORDER — METFORMIN HCL 500 MG
TABLET, EXTENDED RELEASE 24 HR ORAL
Qty: 360 TABLET | Refills: 0 | Status: SHIPPED | OUTPATIENT
Start: 2019-11-26 | End: 2020-03-17

## 2019-11-26 NOTE — TELEPHONE ENCOUNTER
"Allopurinol  Last Written Prescription Date:  05/30/2019  Last Fill Quantity: 90,  # refills: 1   Last office visit: 7/19/2019 with prescribing provider:     Future Office Visit:   Next 5 appointments (look out 90 days)    Jan 14, 2020  3:45 PM CST  Return Visit with Gin Ferreira MD, MG ENDO NURSE  [facility] [address] [place] [id number] [phone]       Routing refill request to provider for review/approval because:  Labs out of range:  CBC uric acid    Requested Prescriptions   Pending Prescriptions Disp Refills     allopurinol (ZYLOPRIM) 300 MG tablet [Pharmacy Med Name: ALLOPURINOL  TAB 300MG] 90 tablet 1     Sig: TAKE 1 TABLET DAILY       Gout Agents Protocol Failed - 11/26/2019  1:33 AM        Failed - CBC on file in past 12 months     Recent Labs   Lab Test 07/17/19  1636  04/19/18  1503   WBC  --   --  6.0   RBC  --   --  4.40   HGB  --   --  13.5   HCT 37.9*   < > 41.0   PLT  --   --  164    < > = values in this interval not displayed.                 Failed - Has Uric Acid on file in past 12 months and value is less than 6     Recent Labs   Lab Test 12/10/18  1510   URIC 6.6     If level is 6mg/dL or greater, ok to refill one time and refer to provider.           Passed - ALT on file in past 12 months     Recent Labs   Lab Test 07/17/19  1636   ALT 57             Passed - Recent (12 mo) or future (30 days) visit within the authorizing provider's specialty     Patient has had an office visit with the authorizing provider or a provider within the authorizing providers department within the previous 12 mos or has a future within next 30 days. See \"Patient Info\" tab in inbasket, or \"Choose Columns\" in Meds & Orders section of the refill encounter.              Passed - Medication is active on med list        Passed - Patient is age 18 or older        Passed - Normal serum creatinine on file in the past 12 months     Recent Labs "   Lab Test 10/19/19  0849   CR 1.12

## 2019-11-26 NOTE — TELEPHONE ENCOUNTER
metFORMIN (GLUCOPHAGE-XR) 500 MG 24 hr tablet      Last Written Prescription Date:  12/20/18  Last Fill Quantity: 360,   # refills: 3  Last Office Visit : 7/17/19  Future Office visit:  1/14/20    90 day to pharmacy.

## 2019-12-15 DIAGNOSIS — I10 ESSENTIAL HYPERTENSION: ICD-10-CM

## 2019-12-15 DIAGNOSIS — I10 UNCONTROLLED HYPERTENSION: ICD-10-CM

## 2019-12-17 RX ORDER — DOXAZOSIN 4 MG/1
TABLET ORAL
Qty: 90 TABLET | Refills: 3 | Status: SHIPPED | OUTPATIENT
Start: 2019-12-17 | End: 2020-11-05

## 2019-12-17 RX ORDER — LISINOPRIL 40 MG/1
TABLET ORAL
Qty: 90 TABLET | Refills: 3 | Status: SHIPPED | OUTPATIENT
Start: 2019-12-17 | End: 2020-11-05

## 2019-12-17 NOTE — TELEPHONE ENCOUNTER
doxazosin (CARDURA) 4 MG tablet      Last Written Prescription Date:  12/31/18  Last Fill Quantity: 90,   # refills: 3  Last Office Visit : 7/17/19  Future Office visit: 1/14/20    Refill to pharmacy.     lisinopril (PRINIVIL/ZESTRIL) 40 MG tablet  Last Written Prescription Date:  12/31/18  Last Fill Quantity: 90,   # refills: 3    Refill to pharmacy.

## 2019-12-21 ENCOUNTER — TRANSFERRED RECORDS (OUTPATIENT)
Dept: HEALTH INFORMATION MANAGEMENT | Facility: CLINIC | Age: 54
End: 2019-12-21

## 2019-12-21 LAB — RETINOPATHY: NEGATIVE

## 2019-12-26 DIAGNOSIS — M19.91 PRIMARY OSTEOARTHRITIS, UNSPECIFIED SITE: ICD-10-CM

## 2019-12-26 RX ORDER — MELOXICAM 15 MG/1
TABLET ORAL
Qty: 30 TABLET | Refills: 5 | Status: SHIPPED | OUTPATIENT
Start: 2019-12-26 | End: 2020-06-29

## 2019-12-26 NOTE — TELEPHONE ENCOUNTER
"meloxicam  Last Written Prescription Date:  12/18/2018  Last Fill Quantity: 30,  # refills: 11   Last office visit: 7/25/2019 with prescribing provider:     Future Office Visit:   Next 5 appointments (look out 90 days)    Jan 14, 2020  3:45 PM CST  Return Visit with Gin Ferreira MD, MG ENDO NURSE  [facility] [address] [place] [id number] [phone]         Requested Prescriptions   Pending Prescriptions Disp Refills     meloxicam (MOBIC) 15 MG tablet [Pharmacy Med Name: MELOXICAM 15MG TABS] 30 tablet 11     Sig: TAKE 1/2 TO 1 TABLET BY MOUTH DAILY       NSAID Medications Failed - 12/26/2019  5:46 AM        Failed - Normal CBC on file in past 12 months     Recent Labs   Lab Test 07/17/19  1636  04/19/18  1503   WBC  --   --  6.0   RBC  --   --  4.40   HGB  --   --  13.5   HCT 37.9*   < > 41.0   PLT  --   --  164    < > = values in this interval not displayed.                 Passed - Blood pressure under 140/90 in past 12 months     BP Readings from Last 3 Encounters:   07/25/19 138/70   07/19/19 112/60   07/17/19 119/51                 Passed - Normal ALT on file in past 12 months     Recent Labs   Lab Test 07/17/19  1636   ALT 57             Passed - Normal AST on file in past 12 months     Recent Labs   Lab Test 07/17/19  1636   AST 35             Passed - Recent (12 mo) or future (30 days) visit within the authorizing provider's specialty     Patient has had an office visit with the authorizing provider or a provider within the authorizing providers department within the previous 12 mos or has a future within next 30 days. See \"Patient Info\" tab in inbasket, or \"Choose Columns\" in Meds & Orders section of the refill encounter.              Passed - Patient is age 6-64 years        Passed - Medication is active on med list        Passed - Normal serum creatinine on file in past 12 months     Recent Labs   Lab Test 10/19/19  0849   CR " 1.12

## 2020-01-08 NOTE — PATIENT INSTRUCTIONS
[facility]  Madison Douglas RN, Diabetes Educator: [phone]  Clinic Nurses LARISSA Hahn; CMA's: Marly [phone]  SUZAN Casas : [phone]  Clinic Fax: [phone]  On-Call Endocrine at the [facility] (after hours/weekends): [phone] option 4  Scheduling Line: [phone]    Appointment Reminders:  * Please bring meter with for staff to download  * If you are due ONLY for an A1C, it is scheduled with the nurse and will be done in clinic. You do not need to schedule a lab appointment. Fasting is not required for an A1C.  * Refill request should be submitted to your pharmacy. They will contact clinic for approval.

## 2020-01-14 ENCOUNTER — OFFICE VISIT (OUTPATIENT)
Dept: ENDOCRINOLOGY | Facility: CLINIC | Age: 55
End: 2020-01-14
Payer: COMMERCIAL

## 2020-01-14 ENCOUNTER — ANCILLARY PROCEDURE (OUTPATIENT)
Dept: ULTRASOUND IMAGING | Facility: CLINIC | Age: 55
End: 2020-01-14
Attending: INTERNAL MEDICINE
Payer: COMMERCIAL

## 2020-01-14 VITALS
BODY MASS INDEX: 43.89 KG/M2 | DIASTOLIC BLOOD PRESSURE: 88 MMHG | HEIGHT: 71 IN | SYSTOLIC BLOOD PRESSURE: 142 MMHG | HEART RATE: 79 BPM | WEIGHT: 313.49 LBS

## 2020-01-14 DIAGNOSIS — Z79.4 TYPE 2 DIABETES MELLITUS WITH DIABETIC NEUROPATHY, WITH LONG-TERM CURRENT USE OF INSULIN (H): Primary | ICD-10-CM

## 2020-01-14 DIAGNOSIS — I10 ESSENTIAL HYPERTENSION WITH GOAL BLOOD PRESSURE LESS THAN 140/90: ICD-10-CM

## 2020-01-14 DIAGNOSIS — E11.40 TYPE 2 DIABETES MELLITUS WITH DIABETIC NEUROPATHY, WITH LONG-TERM CURRENT USE OF INSULIN (H): Primary | ICD-10-CM

## 2020-01-14 DIAGNOSIS — E04.2 MULTIPLE THYROID NODULES: ICD-10-CM

## 2020-01-14 LAB — HBA1C MFR BLD: 9.4 % (ref 0–5.6)

## 2020-01-14 PROCEDURE — 83036 HEMOGLOBIN GLYCOSYLATED A1C: CPT | Performed by: INTERNAL MEDICINE

## 2020-01-14 PROCEDURE — 99214 OFFICE O/P EST MOD 30 MIN: CPT | Performed by: INTERNAL MEDICINE

## 2020-01-14 PROCEDURE — 36415 COLL VENOUS BLD VENIPUNCTURE: CPT | Performed by: INTERNAL MEDICINE

## 2020-01-14 PROCEDURE — 76536 US EXAM OF HEAD AND NECK: CPT | Performed by: RADIOLOGY

## 2020-01-14 ASSESSMENT — MIFFLIN-ST. JEOR: SCORE: 2273.87
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
## 2020-01-14 NOTE — NURSING NOTE
"Kalia Boateng's goals for this visit include: Diabetes follow up  He requests these members of his care team be copied on today's visit information: PCP    PCP: Scottie Boudreaux    Referring Provider:  No referring provider defined for this encounter.    BP (!) 142/88   Pulse 79   Ht 1.803 m (5' 10.98\")   Wt 142.2 kg (313 lb 7.9 oz)   BMI 43.74 kg/m      Do you need any medication refills at today's visit? No  "

## 2020-01-18 DIAGNOSIS — Z79.4 TYPE 2 DIABETES MELLITUS WITH COMPLICATION, WITH LONG-TERM CURRENT USE OF INSULIN (H): ICD-10-CM

## 2020-01-18 DIAGNOSIS — E11.8 TYPE 2 DIABETES MELLITUS WITH COMPLICATION, WITH LONG-TERM CURRENT USE OF INSULIN (H): ICD-10-CM

## 2020-01-20 RX ORDER — BLOOD SUGAR DIAGNOSTIC
STRIP MISCELLANEOUS
Refills: 3 | OUTPATIENT
Start: 2020-01-20

## 2020-01-22 ENCOUNTER — MYC MEDICAL ADVICE (OUTPATIENT)
Dept: ENDOCRINOLOGY | Facility: CLINIC | Age: 55
End: 2020-01-22

## 2020-01-22 DIAGNOSIS — E11.8 TYPE 2 DIABETES MELLITUS WITH COMPLICATION, WITH LONG-TERM CURRENT USE OF INSULIN (H): ICD-10-CM

## 2020-01-22 DIAGNOSIS — Z79.4 TYPE 2 DIABETES MELLITUS WITH COMPLICATION, WITH LONG-TERM CURRENT USE OF INSULIN (H): ICD-10-CM

## 2020-02-15 ENCOUNTER — MYC REFILL (OUTPATIENT)
Dept: ENDOCRINOLOGY | Facility: CLINIC | Age: 55
End: 2020-02-15

## 2020-02-15 DIAGNOSIS — Z79.4 TYPE 2 DIABETES MELLITUS WITH DIABETIC AUTONOMIC NEUROPATHY, WITH LONG-TERM CURRENT USE OF INSULIN (H): ICD-10-CM

## 2020-02-15 DIAGNOSIS — I10 ESSENTIAL HYPERTENSION WITH GOAL BLOOD PRESSURE LESS THAN 140/90: ICD-10-CM

## 2020-02-15 DIAGNOSIS — E11.43 TYPE 2 DIABETES MELLITUS WITH DIABETIC AUTONOMIC NEUROPATHY, WITH LONG-TERM CURRENT USE OF INSULIN (H): ICD-10-CM

## 2020-02-17 RX ORDER — ATORVASTATIN CALCIUM 40 MG/1
40 TABLET, FILM COATED ORAL DAILY
Qty: 90 TABLET | Refills: 3 | Status: SHIPPED | OUTPATIENT
Start: 2020-02-17 | End: 2020-07-09

## 2020-02-17 RX ORDER — CHLORTHALIDONE 25 MG/1
25 TABLET ORAL DAILY
Qty: 90 TABLET | Refills: 0 | Status: SHIPPED | OUTPATIENT
Start: 2020-02-17 | End: 2020-04-30

## 2020-02-17 NOTE — TELEPHONE ENCOUNTER
multiple meds: RF available, pharm transfer   insulin aspart (NOVOLOG FLEXPEN) 100 UNIT/ML pen  chlorthalidone (HYGROTON) 25 MG tablet  atorvastatin (LIPITOR) 40 MG tablet  REMAINING REFILLS SENT TO REQUESTING PHARMACY: [facility]

## 2020-04-13 ENCOUNTER — MYC REFILL (OUTPATIENT)
Dept: ENDOCRINOLOGY | Facility: CLINIC | Age: 55
End: 2020-04-13

## 2020-04-13 DIAGNOSIS — Z79.4 TYPE 2 DIABETES MELLITUS WITH DIABETIC AUTONOMIC NEUROPATHY, WITH LONG-TERM CURRENT USE OF INSULIN (H): ICD-10-CM

## 2020-04-13 DIAGNOSIS — E11.43 TYPE 2 DIABETES MELLITUS WITH DIABETIC AUTONOMIC NEUROPATHY, WITH LONG-TERM CURRENT USE OF INSULIN (H): ICD-10-CM

## 2020-04-14 RX ORDER — INSULIN ASPART 100 [IU]/ML
INJECTION, SOLUTION INTRAVENOUS; SUBCUTANEOUS
Qty: 30 ML | Refills: 0 | Status: SHIPPED | OUTPATIENT
Start: 2020-04-14 | End: 2020-11-05

## 2020-04-14 NOTE — TELEPHONE ENCOUNTER
Will forward to BRYAN Marvin for approval.    Yessenia Wallis LPN  Diabetes Clinic Coordinator   [facility]  [facility]

## 2020-05-06 DIAGNOSIS — M1A.09X0 IDIOPATHIC CHRONIC GOUT OF MULTIPLE SITES WITHOUT TOPHUS: ICD-10-CM

## 2020-05-07 RX ORDER — ALLOPURINOL 300 MG/1
1 TABLET ORAL DAILY
Qty: 90 TABLET | Refills: 0 | Status: SHIPPED | OUTPATIENT
Start: 2020-05-07 | End: 2020-07-09

## 2020-05-07 NOTE — TELEPHONE ENCOUNTER
Routing refill request to provider for review/approval because:  Labs out of range:  CBC  Labs not current:  Uric Acid  Last Written Prescription Date:  11/26/19  Last Fill Quantity: 90,  # refills: 1   Last office visit: 7/19/2019 with prescribing provider:     Future Office Visit:   Next 5 appointments (look out 90 days)    Jul 21, 2020  3:50 PM CDT  Return Visit with Gin Ferreira MD, MG ENDO NURSE  [facility] ([facility]) [address] [place] [id number]  [phone]         BERNARDA PattonN, RN

## 2020-06-29 ENCOUNTER — MYC REFILL (OUTPATIENT)
Dept: FAMILY MEDICINE | Facility: CLINIC | Age: 55
End: 2020-06-29

## 2020-06-29 DIAGNOSIS — M19.91 PRIMARY OSTEOARTHRITIS, UNSPECIFIED SITE: ICD-10-CM

## 2020-06-29 NOTE — LETTER
July 3, 2020      Kalia Boateng  [address] [address] [place] [id number]      Dear Kalia,    We recently received a call from your pharmacy requesting a refill of your medication.    A review of your chart indicates that an appointment is required with your provider.  Please call the clinic to schedule your appointment.    We have authorized one refill of your medication to allow time for you to schedule.   If you have a history of diabetes or high cholesterol, please come in fasting for the appointment. Fasting entails nothing to eat or drink 8 hours prior to your appointment; with the exception on water. You may take your medication the day of the appointment.    Thank you,      Scottie Boudreaux MD

## 2020-06-30 RX ORDER — MELOXICAM 15 MG/1
7.5-15 TABLET ORAL DAILY
Qty: 90 TABLET | Refills: 0 | Status: SHIPPED | OUTPATIENT
Start: 2020-06-30 | End: 2020-07-09

## 2020-06-30 NOTE — TELEPHONE ENCOUNTER
Mobic 15 mg      Last Written Prescription Date:  12/26/19  Last Fill Quantity: 30,   # refills: 5  Last Office Visit: 7/19/19  Future Office visit:    Next 5 appointments (look out 90 days)    Jul 21, 2020  3:50 PM CDT  Return Visit with Gin Ferreira MD, MG ENDO NURSE  [facility] ([facility]) [address] [place] [id number] [phone]           Routing refill request to provider for review/approval because:  Drug not on the [facility], [facility] or [facility] refill protocol or controlled substance

## 2020-06-30 NOTE — TELEPHONE ENCOUNTER
Medication refilled x1.  Needs appointment for further refills.  Will have staff notify patient.    Scottie Boudreaux MD

## 2020-07-09 ENCOUNTER — VIRTUAL VISIT (OUTPATIENT)
Dept: FAMILY MEDICINE | Facility: CLINIC | Age: 55
End: 2020-07-09
Payer: COMMERCIAL

## 2020-07-09 DIAGNOSIS — M19.91 PRIMARY OSTEOARTHRITIS, UNSPECIFIED SITE: ICD-10-CM

## 2020-07-09 DIAGNOSIS — M1A.09X0 IDIOPATHIC CHRONIC GOUT OF MULTIPLE SITES WITHOUT TOPHUS: ICD-10-CM

## 2020-07-09 DIAGNOSIS — I10 BENIGN ESSENTIAL HYPERTENSION: Primary | ICD-10-CM

## 2020-07-09 PROCEDURE — 99214 OFFICE O/P EST MOD 30 MIN: CPT | Mod: TEL | Performed by: FAMILY MEDICINE

## 2020-07-09 RX ORDER — ALLOPURINOL 300 MG/1
1 TABLET ORAL DAILY
Qty: 90 TABLET | Refills: 4 | Status: SHIPPED | OUTPATIENT
Start: 2020-07-09 | End: 2021-07-23

## 2020-07-09 RX ORDER — AMLODIPINE BESYLATE 5 MG/1
5 TABLET ORAL DAILY
Qty: 90 TABLET | Refills: 4 | Status: SHIPPED | OUTPATIENT
Start: 2020-07-09 | End: 2021-07-23

## 2020-07-09 RX ORDER — ATORVASTATIN CALCIUM 40 MG/1
40 TABLET, FILM COATED ORAL DAILY
Qty: 90 TABLET | Refills: 4 | Status: SHIPPED | OUTPATIENT
Start: 2020-07-09 | End: 2021-08-05

## 2020-07-09 RX ORDER — MELOXICAM 15 MG/1
7.5-15 TABLET ORAL DAILY
Qty: 90 TABLET | Refills: 4 | Status: SHIPPED | OUTPATIENT
Start: 2020-07-09 | End: 2021-09-17

## 2020-07-09 NOTE — PROGRESS NOTES
"Kalia Boateng is a 55 year old male who is being evaluated via a billable telephone visit.      The patient has been notified of following:     \"This telephone visit will be conducted via a call between you and your physician/provider. We have found that certain health care needs can be provided without the need for a physical exam.  This service lets us provide the care you need with a short phone conversation.  If a prescription is necessary we can send it directly to your pharmacy.  If lab work is needed we can place an order for that and you can then stop by our lab to have the test done at a later time.    Telephone visits are billed at different rates depending on your insurance coverage. During this emergency period, for some insurers they may be billed the same as an in-person visit.  Please reach out to your insurance provider with any questions.    If during the course of the call the physician/provider feels a telephone visit is not appropriate, you will not be charged for this service.\"    Patient has given verbal consent for Telephone visit?  Yes    What phone number would you like to be contacted at? [phone]    How would you like to obtain your AVS? Julio Cesar Parekh     Kalia Boateng is a 55 year old male who presents via phone visit today for the following health issues:    HPI              Continues to take 25 mg/day spironolactone.  Last blood pressure 6 months ago not quite at goal.  Had hyperkalemia last year and had medication temporarily stopped and still has dose reduced from before.      Gout stable.  No flare for past 2 years.  Takes allopurinol.    Continues to follow with endo for diabetes management.  Has appointment in about 2 weeks.    Continues to take meloxicam for bilateral osteoarthritis of knees s/p partial knee replacements.  Has bad pain when not taking this medication.    Reviewed and updated as needed this visit by Provider  Tobacco  Allergies  Meds  Problems  " Med Hx  Surg Hx  Fam Hx         Review of Systems   Constitutional, HEENT, cardiovascular, pulmonary, GI, , musculoskeletal, neuro, skin, endocrine and psych systems are negative, except as otherwise noted.       Objective   Reported vitals:  There were no vitals taken for this visit.   healthy, alert and no distress  PSYCH: Alert and oriented times 3; coherent speech, normal   rate and volume, able to articulate logical thoughts, able   to abstract reason, no tangential thoughts, no hallucinations   or delusions  His affect is normal  RESP: No cough, no audible wheezing, able to talk in full sentences  Remainder of exam unable to be completed due to telephone visits            Assessment/Plan:  ASSESSMENT/ORDERS:    ICD-10-CM    1. Benign essential hypertension  I10 Basic metabolic panel     Lipid panel reflex to direct LDL Fasting     Albumin Random Urine Quantitative with Creat Ratio     amLODIPine (NORVASC) 5 MG tablet   2. Primary osteoarthritis, unspecified site  M19.91 meloxicam (MOBIC) 15 MG tablet   3. Idiopathic chronic gout of multiple sites without tophus  M1A.09X0 allopurinol (ZYLOPRIM) 300 MG tablet   4. Type 2 diabetes, uncontrolled, with neuropathy (H)  E11.40 Basic metabolic panel    E11.65 Lipid panel reflex to direct LDL Fasting     Albumin Random Urine Quantitative with Creat Ratio     atorvastatin (LIPITOR) 40 MG tablet     PLAN:  1.  Patient to get labs prior to endo appointment in 2 weeks.  Will look at labs and blood pressure that is checked at office visit at that time (unless it is a virtual visit, in which case we will have him check it at home or come in to clinic for float visit).  Will check his blood pressure reading in 2 weeks and if still not at goal and potassium is within normal limits, will go back to 50 mg spironolactone dose.  2.   Medications refilled for all other above noted stable conditions.  Labs ordered as noted above.     Return in about 1 year (around 7/9/2021) for  medication recheck.      Phone call duration:  20 minutes    Scottie Boudreaux MD

## 2020-07-16 ENCOUNTER — MYC MEDICAL ADVICE (OUTPATIENT)
Dept: ENDOCRINOLOGY | Facility: CLINIC | Age: 55
End: 2020-07-16

## 2020-07-19 DIAGNOSIS — M1A.09X0 IDIOPATHIC CHRONIC GOUT OF MULTIPLE SITES WITHOUT TOPHUS: ICD-10-CM

## 2020-07-19 DIAGNOSIS — I10 BENIGN ESSENTIAL HYPERTENSION: ICD-10-CM

## 2020-07-19 LAB
ANION GAP SERPL CALCULATED.3IONS-SCNC: 5 MMOL/L (ref 3–14)
BUN SERPL-MCNC: 36 MG/DL (ref 7–30)
CALCIUM SERPL-MCNC: 9.2 MG/DL (ref 8.5–10.1)
CHLORIDE SERPL-SCNC: 106 MMOL/L (ref 94–109)
CHOLEST SERPL-MCNC: 183 MG/DL
CO2 SERPL-SCNC: 29 MMOL/L (ref 20–32)
CREAT SERPL-MCNC: 1.27 MG/DL (ref 0.66–1.25)
CREAT UR-MCNC: 185 MG/DL
ERYTHROCYTE [DISTWIDTH] IN BLOOD BY AUTOMATED COUNT: 14.9 % (ref 10–15)
GFR SERPL CREATININE-BSD FRML MDRD: 62 ML/MIN/{1.73_M2}
GLUCOSE SERPL-MCNC: 111 MG/DL (ref 70–99)
HBA1C MFR BLD: 6.9 % (ref 0–5.6)
HCT VFR BLD AUTO: 41.1 % (ref 40–53)
HDLC SERPL-MCNC: 35 MG/DL
HGB BLD-MCNC: 13.6 G/DL (ref 13.3–17.7)
LDLC SERPL CALC-MCNC: 118 MG/DL
MCH RBC QN AUTO: 30.4 PG (ref 26.5–33)
MCHC RBC AUTO-ENTMCNC: 33.1 G/DL (ref 31.5–36.5)
MCV RBC AUTO: 92 FL (ref 78–100)
MICROALBUMIN UR-MCNC: 539 MG/L
MICROALBUMIN/CREAT UR: 291.35 MG/G CR (ref 0–17)
NONHDLC SERPL-MCNC: 148 MG/DL
PLATELET # BLD AUTO: 155 10E9/L (ref 150–450)
POTASSIUM SERPL-SCNC: 4 MMOL/L (ref 3.4–5.3)
RBC # BLD AUTO: 4.48 10E12/L (ref 4.4–5.9)
SODIUM SERPL-SCNC: 140 MMOL/L (ref 133–144)
TRIGL SERPL-MCNC: 148 MG/DL
URATE SERPL-MCNC: 7 MG/DL (ref 3.5–7.2)
WBC # BLD AUTO: 6.3 10E9/L (ref 4–11)

## 2020-07-19 PROCEDURE — 83036 HEMOGLOBIN GLYCOSYLATED A1C: CPT | Mod: QW | Performed by: FAMILY MEDICINE

## 2020-07-19 PROCEDURE — 36415 COLL VENOUS BLD VENIPUNCTURE: CPT | Performed by: FAMILY MEDICINE

## 2020-07-19 PROCEDURE — 82043 UR ALBUMIN QUANTITATIVE: CPT | Performed by: FAMILY MEDICINE

## 2020-07-19 PROCEDURE — 84550 ASSAY OF BLOOD/URIC ACID: CPT | Performed by: FAMILY MEDICINE

## 2020-07-19 PROCEDURE — 80048 BASIC METABOLIC PNL TOTAL CA: CPT | Performed by: FAMILY MEDICINE

## 2020-07-19 PROCEDURE — 85027 COMPLETE CBC AUTOMATED: CPT | Performed by: FAMILY MEDICINE

## 2020-07-19 PROCEDURE — 80061 LIPID PANEL: CPT | Performed by: FAMILY MEDICINE

## 2020-07-21 ENCOUNTER — VIRTUAL VISIT (OUTPATIENT)
Dept: ENDOCRINOLOGY | Facility: CLINIC | Age: 55
End: 2020-07-21
Payer: COMMERCIAL

## 2020-07-21 DIAGNOSIS — Z79.4 TYPE 2 DIABETES MELLITUS WITH DIABETIC NEUROPATHY, WITH LONG-TERM CURRENT USE OF INSULIN (H): Primary | ICD-10-CM

## 2020-07-21 DIAGNOSIS — E11.40 TYPE 2 DIABETES MELLITUS WITH DIABETIC NEUROPATHY, WITH LONG-TERM CURRENT USE OF INSULIN (H): Primary | ICD-10-CM

## 2020-07-21 DIAGNOSIS — I10 ESSENTIAL HYPERTENSION: ICD-10-CM

## 2020-07-21 PROCEDURE — 99214 OFFICE O/P EST MOD 30 MIN: CPT | Mod: TEL | Performed by: INTERNAL MEDICINE
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
## 2020-07-24 ENCOUNTER — TELEPHONE (OUTPATIENT)
Dept: FAMILY MEDICINE | Facility: CLINIC | Age: 55
End: 2020-07-24

## 2020-07-24 NOTE — TELEPHONE ENCOUNTER
----- Message from Scottie Boudreaux MD sent at 7/9/2020  3:35 PM CDT -----  Regarding: f/u bp to see about spironolactone  Needs possible medication adjustment depending on blood pressure result.

## 2020-07-24 NOTE — TELEPHONE ENCOUNTER
I left a message asking patient to return our call.  Please inform patient of the message below.  Dale Petty, CMA

## 2020-07-24 NOTE — RESULT ENCOUNTER NOTE
Kaila  Your results look clinically stable from past checks.  Please let me know if you have any questions.    Sincerely,  Dr. Boudreaux

## 2020-07-28 NOTE — TELEPHONE ENCOUNTER
Spoke with patient and he states he has a visit with his dietician a week ago and was advised to stop taking the Cardura and recheck his BP in 1 week. His blood pressure today was 137/93 and pulse was 53.     Please advise.     Thalia Diaz MA

## 2020-07-28 NOTE — TELEPHONE ENCOUNTER
Please attempt contacting patient again.  If no response, please send letter and close encounter.    Scottie Boudreaux MD

## 2020-07-31 NOTE — TELEPHONE ENCOUNTER
Chart reviewed as well as recent notes.  We should do a video visit next week to follow-up on his blood pressure unless he has gotten further direction from endocrine.    Will have staff notify patient and set up appointment.    Scottie Boudreaux MD

## 2020-08-07 ENCOUNTER — TELEPHONE (OUTPATIENT)
Dept: FAMILY MEDICINE | Facility: CLINIC | Age: 55
End: 2020-08-07

## 2020-08-07 NOTE — TELEPHONE ENCOUNTER
Panel Management Review      Patient has the following on his problem list:     Diabetes    ASA: Passed    Last A1C  Lab Results   Component Value Date    A1C 6.9 07/19/2020    A1C 9.4 01/14/2020    A1C 8.6 10/19/2019    A1C 6.9 07/19/2019    A1C 7.2 07/17/2019     A1C tested: FAILED    Last LDL:    Lab Results   Component Value Date    CHOL 183 07/19/2020     Lab Results   Component Value Date    HDL 35 07/19/2020     Lab Results   Component Value Date     07/19/2020     Lab Results   Component Value Date    TRIG 148 07/19/2020     Lab Results   Component Value Date    CHOLHDLRATIO 3.7 02/14/2015     Lab Results   Component Value Date    NHDL 148 07/19/2020       Is the patient on a Statin? YES             Is the patient on Aspirin? YES    Medications     HMG CoA Reductase Inhibitors     atorvastatin (LIPITOR) 40 MG tablet       Salicylates     aspirin 81 MG EC tablet             Last three blood pressure readings:  BP Readings from Last 3 Encounters:   01/14/20 (!) 142/88   07/25/19 138/70   07/19/19 112/60       Date of last diabetes office visit: 7/19/2019     Tobacco History:     History   Smoking Status     Former Smoker     Packs/day: 0.50     Years: 28.00     Types: Cigarettes     Quit date: 1/1/2010   Smokeless Tobacco     Former User     Types: Chew     Quit date: 12/31/2009         Hypertension   Last three blood pressure readings:  BP Readings from Last 3 Encounters:   01/14/20 (!) 142/88   07/25/19 138/70   07/19/19 112/60     Blood pressure: FAILED    HTN Guidelines:  Less than 140/90      Composite cancer screening  Chart review shows that this patient is due/due soon for the following None  Summary:    Patient is due/failing the following:   BP CHECK    Action needed:   Patient needs nurse only appointment.    Type of outreach:    Phone, left message for patient to call back.     Questions for provider review:    None                                                                                                                                     Lalo Moody MA     Chart routed to Provider .

## 2020-08-25 DIAGNOSIS — E11.21 TYPE 2 DIABETES MELLITUS WITH DIABETIC NEPHROPATHY, WITH LONG-TERM CURRENT USE OF INSULIN (H): ICD-10-CM

## 2020-08-25 DIAGNOSIS — Z79.4 TYPE 2 DIABETES MELLITUS WITH DIABETIC NEPHROPATHY, WITH LONG-TERM CURRENT USE OF INSULIN (H): ICD-10-CM

## 2020-08-25 NOTE — TELEPHONE ENCOUNTER
Will forward to BRYAN Marvin to review.    Yessenia Wallis LPN  Diabetes Clinic Coordinator   [facility] End[facility]inology [facility] Clinics  [facility]

## 2020-08-25 NOTE — TELEPHONE ENCOUNTER
TRESIBA FLEXTOUCH PEN 3ML 3'S 200U/ML  Last Written Prescription Date:  7/19/2019  Last Fill Quantity: 60,   # refills: 3  Last Office Visit : 7/21/2020  Future Office visit:  1/19/2021    Routing refill request to provider for review/approval because:  Drug not on the [facility], [facility] or  Health refill protocol or controlled substance      Gin Mehta RN  Central Triage Red Flags/Med Refills

## 2020-09-18 RX ORDER — PEN NEEDLE, DIABETIC 31 GX5/16"
NEEDLE, DISPOSABLE MISCELLANEOUS
Qty: 200 EACH | Refills: 3 | Status: SHIPPED | OUTPATIENT
Start: 2020-09-18 | End: 2021-12-08

## 2020-09-18 NOTE — TELEPHONE ENCOUNTER
BD PEN NEEDLE SHORT 31G X 8 MM MISC     Last Written Prescription Date:  7/2/2019  Last Fill Quantity: 200,   # refills: 3  Last Office Visit : 7/21/2020  Future Office visit:  1/19/2021  200 each, 3 Refills sent to pharm 9/18/2020    Gin Mehta RN  Central Triage Red Flags/Med Refills

## 2020-10-19 DIAGNOSIS — I10 ESSENTIAL HYPERTENSION: ICD-10-CM

## 2020-10-19 RX ORDER — SPIRONOLACTONE 25 MG/1
TABLET ORAL
Qty: 90 TABLET | Refills: 0 | Status: SHIPPED | OUTPATIENT
Start: 2020-10-19 | End: 2020-11-05

## 2020-10-19 NOTE — TELEPHONE ENCOUNTER
Left message for patient to call back and speak with any .    Thank you,  Janis Messer   for [facility]

## 2020-10-19 NOTE — TELEPHONE ENCOUNTER
Aldactone Prescription approved per [facility] Refill Protocol.  Due for office visit and labs. Will forward to schedulers to call and set up F2F misha..............................LARISSA Odell

## 2020-11-05 ENCOUNTER — OFFICE VISIT (OUTPATIENT)
Dept: FAMILY MEDICINE | Facility: CLINIC | Age: 55
End: 2020-11-05
Payer: COMMERCIAL

## 2020-11-05 VITALS
WEIGHT: 304 LBS | HEIGHT: 70 IN | HEART RATE: 80 BPM | RESPIRATION RATE: 20 BRPM | TEMPERATURE: 98.4 F | DIASTOLIC BLOOD PRESSURE: 76 MMHG | OXYGEN SATURATION: 98 % | SYSTOLIC BLOOD PRESSURE: 128 MMHG | BODY MASS INDEX: 43.52 KG/M2

## 2020-11-05 DIAGNOSIS — Z79.4 TYPE 2 DIABETES MELLITUS WITH DIABETIC AUTONOMIC NEUROPATHY, WITH LONG-TERM CURRENT USE OF INSULIN (H): ICD-10-CM

## 2020-11-05 DIAGNOSIS — E11.43 TYPE 2 DIABETES MELLITUS WITH DIABETIC AUTONOMIC NEUROPATHY, WITH LONG-TERM CURRENT USE OF INSULIN (H): Primary | ICD-10-CM

## 2020-11-05 DIAGNOSIS — I10 ESSENTIAL HYPERTENSION: ICD-10-CM

## 2020-11-05 DIAGNOSIS — Z23 ENCOUNTER FOR IMMUNIZATION: ICD-10-CM

## 2020-11-05 DIAGNOSIS — E66.01 MORBID OBESITY, UNSPECIFIED OBESITY TYPE (H): Chronic | ICD-10-CM

## 2020-11-05 DIAGNOSIS — E11.43 TYPE 2 DIABETES MELLITUS WITH DIABETIC AUTONOMIC NEUROPATHY, WITH LONG-TERM CURRENT USE OF INSULIN (H): ICD-10-CM

## 2020-11-05 DIAGNOSIS — Z79.4 TYPE 2 DIABETES MELLITUS WITH DIABETIC AUTONOMIC NEUROPATHY, WITH LONG-TERM CURRENT USE OF INSULIN (H): Primary | ICD-10-CM

## 2020-11-05 PROCEDURE — 90471 IMMUNIZATION ADMIN: CPT | Performed by: FAMILY MEDICINE

## 2020-11-05 PROCEDURE — 90715 TDAP VACCINE 7 YRS/> IM: CPT | Performed by: FAMILY MEDICINE

## 2020-11-05 PROCEDURE — 99214 OFFICE O/P EST MOD 30 MIN: CPT | Mod: 25 | Performed by: FAMILY MEDICINE

## 2020-11-05 PROCEDURE — 90472 IMMUNIZATION ADMIN EACH ADD: CPT | Performed by: FAMILY MEDICINE

## 2020-11-05 PROCEDURE — 99207 PR FOOT EXAM NO CHARGE: CPT | Performed by: FAMILY MEDICINE

## 2020-11-05 PROCEDURE — 90682 RIV4 VACC RECOMBINANT DNA IM: CPT | Performed by: FAMILY MEDICINE

## 2020-11-05 RX ORDER — INSULIN ASPART 100 [IU]/ML
INJECTION, SOLUTION INTRAVENOUS; SUBCUTANEOUS
Qty: 30 ML | Refills: 3 | Status: SHIPPED | OUTPATIENT
Start: 2020-11-05 | End: 2021-09-22

## 2020-11-05 RX ORDER — LISINOPRIL 40 MG/1
40 TABLET ORAL AT BEDTIME
Qty: 90 TABLET | Refills: 4 | Status: SHIPPED | OUTPATIENT
Start: 2020-11-05 | End: 2021-09-22

## 2020-11-05 RX ORDER — SPIRONOLACTONE 25 MG/1
25 TABLET ORAL DAILY
Qty: 90 TABLET | Refills: 4 | Status: SHIPPED | OUTPATIENT
Start: 2020-11-05 | End: 2022-01-19

## 2020-11-05 ASSESSMENT — MIFFLIN-ST. JEOR: SCORE: 2210.18

## 2020-11-05 ASSESSMENT — PAIN SCALES - GENERAL: PAINLEVEL: SEVERE PAIN (6)

## 2020-11-05 NOTE — TELEPHONE ENCOUNTER
insulin aspart (NOVOLOG FLEXPEN) 100 UNIT/ML pen  Last Written Prescription Date: 4/14/20  Last Fill Quantity: 30ml,   # refills: 0  Last Office Visit : 7/21/20  MG  Future Office visit: 1/19/21 MG    Routing refill request to provider for review/approval because:   endo triage to fill

## 2020-11-05 NOTE — PROGRESS NOTES
Subjective     Kalia Boateng is a 55 year old male who presents to clinic today for the following health issues:    HPI         Diabetes Follow-up    How often are you checking your blood sugar? One time daily  What time of day are you checking your blood sugars (select all that apply)?  Before meals  Have you had any blood sugars above 200?  No  Have you had any blood sugars below 70?  No    What symptoms do you notice when your blood sugar is low?  Shaky and Weak    What concerns do you have today about your diabetes? None     Do you have any of these symptoms? (Select all that apply)  No numbness or tingling in feet.  No redness, sores or blisters on feet.  No complaints of excessive thirst.  No reports of blurry vision.  No significant changes to weight.      BP Readings from Last 2 Encounters:   11/05/20 128/76   01/14/20 (!) 142/88     Hemoglobin A1C (%)   Date Value   07/19/2020 6.9 (H)   01/14/2020 9.4 (A)     LDL Cholesterol Calculated (mg/dL)   Date Value   07/19/2020 118 (H)   12/10/2018 104 (H)               Hypertension Follow-up      Do you check your blood pressure regularly outside of the clinic? Yes     Are you following a low salt diet? No    Are your blood pressures ever more than 140 on the top number (systolic) OR more   than 90 on the bottom number (diastolic), for example 140/90? No      How many servings of fruits and vegetables do you eat daily?  3 per day.     On average, how many sweetened beverages do you drink each day (Examples: soda, juice, sweet tea, etc.  Do NOT count diet or artificially sweetened beverages)?   1per day Mountain Dew Zero    How many days per week do you exercise enough to make your heart beat faster? Active outside.     How many minutes a day do you exercise enough to make your heart beat faster? 60 or more    How many days per week do you miss taking your medication? 0      Followed by endocrine for his diabetes.  This has improved.  Has neuropathy symptoms that  "have persisted by do not cause pain.      Working on weight management and has lost weight since last office visit, but he is not entirely sure how.    Wt Readings from Last 4 Encounters:   11/05/20 137.9 kg (304 lb)   01/14/20 142.2 kg (313 lb 7.9 oz)   07/19/19 143.6 kg (316 lb 9.6 oz)   07/17/19 143.8 kg (317 lb)      Hypertension management has involved amlodipine.  He does have lower extremity edema, but that was long prior to starting medication and has not worsened since then.    On spironolactone, had dose decreased last winter due to hyperkalemia.  Potassium and blood pressure both at goal at this time.    He is wondering about getting 20 mg lisinopril tabs instead of breaking 40 mg pills in half and taking twice daily.  This is a prescription from his endocrinologist.  He is not sure why this is being done this way rather than taking full 40 mg dose once daily.    Review of Systems   Constitutional, HEENT, cardiovascular, pulmonary, gi and gu systems are negative, except as otherwise noted.      Objective    /76   Pulse 80   Temp 98.4  F (36.9  C) (Temporal)   Resp 20   Ht 1.778 m (5' 10\")   Wt 137.9 kg (304 lb)   SpO2 98%   BMI 43.62 kg/m    Body mass index is 43.62 kg/m .  Physical Exam  Constitutional:       Appearance: He is well-developed.   Cardiovascular:      Rate and Rhythm: Normal rate and regular rhythm.      Heart sounds: Normal heart sounds, S1 normal and S2 normal. No murmur.   Pulmonary:      Effort: Pulmonary effort is normal. No respiratory distress.      Breath sounds: Normal breath sounds. No wheezing, rhonchi or rales.   Musculoskeletal:      Comments: FEET:  monofilament exam shows numbness in great toes bilaterally.  Dorsalis pedis pulses 2+ bilaterally.  Feet warm. Lower extremity varicose veins bilaterally   Feet:      Right foot:      Skin integrity: No ulcer, blister, skin breakdown or erythema.      Left foot:      Skin integrity: No ulcer, blister, skin breakdown or " "erythema.   Neurological:      Mental Status: He is alert.                    Assessment & Plan     ASSESSMENT/ORDERS:    ICD-10-CM    1. Type 2 diabetes mellitus with diabetic autonomic neuropathy, with long-term current use of insulin (H)  E11.43 FOOT EXAM    Z79.4    2. Essential hypertension  I10 spironolactone (ALDACTONE) 25 MG tablet     lisinopril (ZESTRIL) 40 MG tablet   3. Morbid obesity, unspecified obesity type (H)  E66.01    4. Encounter for immunization  Z23 FLU VAC, QUADRIVALENT (RIV4) RECOMBINANT DNA, IM     TDAP VACCINE (Adacel, Boostrix)  [[id number]]     PLAN:  1.  Change lisinopril to 40 mg nightly.    2.  Continue spironolactone at current dose.  3.  Patient is not concerned about his lower extremity and does not feel changing amlodipine for hypertension management is necessary.    4.  Continue to manage diabetes mellitus with endocrine.       BMI:   Estimated body mass index is 43.62 kg/m  as calculated from the following:    Height as of this encounter: 1.778 m (5' 10\").    Weight as of this encounter: 137.9 kg (304 lb).   Weight management plan: reviewed weight managemnt in its relationship to diabetes management.  he will continue to increase exercise and watch intake.             Return in about 6 months (around 5/5/2021) for medication recheck, hypertension recheck.    Scottie Boudreaux MD  [facility]    "

## 2020-11-05 NOTE — TELEPHONE ENCOUNTER
Will forward to BRYAN Marvin to review.    Yessenia Wallis LPN  Diabetes Clinic Coordinator   [facility]ocrinology [facility] Pediatric [facility] Clinics  [facility]

## 2021-01-09 ENCOUNTER — HEALTH MAINTENANCE LETTER (OUTPATIENT)
Age: 56
End: 2021-01-09

## 2021-01-18 ENCOUNTER — MYC MEDICAL ADVICE (OUTPATIENT)
Dept: ENDOCRINOLOGY | Facility: CLINIC | Age: 56
End: 2021-01-18

## 2021-01-19 ENCOUNTER — VIRTUAL VISIT (OUTPATIENT)
Dept: ENDOCRINOLOGY | Facility: CLINIC | Age: 56
End: 2021-01-19
Payer: COMMERCIAL

## 2021-01-19 DIAGNOSIS — Z79.4 TYPE 2 DIABETES MELLITUS WITH DIABETIC NEPHROPATHY, WITH LONG-TERM CURRENT USE OF INSULIN (H): Primary | ICD-10-CM

## 2021-01-19 DIAGNOSIS — E04.2 MULTIPLE THYROID NODULES: ICD-10-CM

## 2021-01-19 DIAGNOSIS — E11.21 TYPE 2 DIABETES MELLITUS WITH DIABETIC NEPHROPATHY, WITH LONG-TERM CURRENT USE OF INSULIN (H): Primary | ICD-10-CM

## 2021-01-19 PROCEDURE — 99214 OFFICE O/P EST MOD 30 MIN: CPT | Mod: 95 | Performed by: INTERNAL MEDICINE
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
## 2021-01-19 NOTE — PROGRESS NOTES
Kalia is a 55 year old who is being evaluated via a billable video visit.      How would you like to obtain your AVS? MyChart  If the video visit is dropped, the invitation should be resent by: Text to cell phone: [phone]  Will anyone else be joining your video visit? No      Marly Bravo CMA  Adult Endocrinology  [facility]

## 2021-02-14 DIAGNOSIS — E04.2 MULTIPLE THYROID NODULES: ICD-10-CM

## 2021-02-14 LAB
HBA1C MFR BLD: 8.7 % (ref 0–5.6)
TSH SERPL DL<=0.005 MIU/L-ACNC: 1.03 MU/L (ref 0.4–4)

## 2021-02-14 PROCEDURE — 83036 HEMOGLOBIN GLYCOSYLATED A1C: CPT | Performed by: INTERNAL MEDICINE

## 2021-02-14 PROCEDURE — 36415 COLL VENOUS BLD VENIPUNCTURE: CPT | Performed by: INTERNAL MEDICINE

## 2021-02-14 PROCEDURE — 84443 ASSAY THYROID STIM HORMONE: CPT | Performed by: INTERNAL MEDICINE

## 2021-03-15 ENCOUNTER — APPOINTMENT (OUTPATIENT)
Dept: GENERAL RADIOLOGY | Facility: CLINIC | Age: 56
End: 2021-03-15
Attending: FAMILY MEDICINE
Payer: COMMERCIAL

## 2021-03-15 ENCOUNTER — HOSPITAL ENCOUNTER (EMERGENCY)
Facility: CLINIC | Age: 56
Discharge: HOME | End: 2021-03-15
Attending: FAMILY MEDICINE | Admitting: FAMILY MEDICINE
Payer: COMMERCIAL

## 2021-03-15 VITALS
DIASTOLIC BLOOD PRESSURE: 68 MMHG | WEIGHT: 305.2 LBS | SYSTOLIC BLOOD PRESSURE: 134 MMHG | RESPIRATION RATE: 20 BRPM | HEART RATE: 57 BPM | TEMPERATURE: 97.4 F | BODY MASS INDEX: 43.79 KG/M2 | OXYGEN SATURATION: 99 %

## 2021-03-15 DIAGNOSIS — R07.89 ATYPICAL CHEST PAIN: ICD-10-CM

## 2021-03-15 LAB
ALBUMIN SERPL-MCNC: 3.5 G/DL (ref 3.4–5)
ALP SERPL-CCNC: 118 U/L (ref 40–150)
ALT SERPL W P-5'-P-CCNC: 38 U/L (ref 0–70)
ANION GAP SERPL CALCULATED.3IONS-SCNC: 6 MMOL/L (ref 3–14)
AST SERPL W P-5'-P-CCNC: 24 U/L (ref 0–45)
BASOPHILS # BLD AUTO: 0 10E9/L (ref 0–0.2)
BASOPHILS NFR BLD AUTO: 0.6 %
BILIRUB DIRECT SERPL-MCNC: <0.1 MG/DL (ref 0–0.2)
BILIRUB SERPL-MCNC: 0.3 MG/DL (ref 0.2–1.3)
BUN SERPL-MCNC: 29 MG/DL (ref 7–30)
CALCIUM SERPL-MCNC: 9.3 MG/DL (ref 8.5–10.1)
CHLORIDE SERPL-SCNC: 106 MMOL/L (ref 94–109)
CO2 SERPL-SCNC: 28 MMOL/L (ref 20–32)
CREAT SERPL-MCNC: 1.21 MG/DL (ref 0.66–1.25)
DIFFERENTIAL METHOD BLD: NORMAL
EOSINOPHIL # BLD AUTO: 0.5 10E9/L (ref 0–0.7)
EOSINOPHIL NFR BLD AUTO: 6.9 %
ERYTHROCYTE [DISTWIDTH] IN BLOOD BY AUTOMATED COUNT: 14 % (ref 10–15)
GFR SERPL CREATININE-BSD FRML MDRD: 66 ML/MIN/{1.73_M2}
GLUCOSE SERPL-MCNC: 119 MG/DL (ref 70–99)
HCT VFR BLD AUTO: 43 % (ref 40–53)
HGB BLD-MCNC: 14.2 G/DL (ref 13.3–17.7)
IMM GRANULOCYTES # BLD: 0 10E9/L (ref 0–0.4)
IMM GRANULOCYTES NFR BLD: 0.4 %
LYMPHOCYTES # BLD AUTO: 1.4 10E9/L (ref 0.8–5.3)
LYMPHOCYTES NFR BLD AUTO: 19.7 %
MCH RBC QN AUTO: 30.3 PG (ref 26.5–33)
MCHC RBC AUTO-ENTMCNC: 33 G/DL (ref 31.5–36.5)
MCV RBC AUTO: 92 FL (ref 78–100)
MONOCYTES # BLD AUTO: 0.4 10E9/L (ref 0–1.3)
MONOCYTES NFR BLD AUTO: 5.2 %
NEUTROPHILS # BLD AUTO: 4.8 10E9/L (ref 1.6–8.3)
NEUTROPHILS NFR BLD AUTO: 67.2 %
NRBC # BLD AUTO: 0 10*3/UL
NRBC BLD AUTO-RTO: 0 /100
NT-PROBNP SERPL-MCNC: 17 PG/ML (ref 0–900)
PLATELET # BLD AUTO: 167 10E9/L (ref 150–450)
POTASSIUM SERPL-SCNC: 3.8 MMOL/L (ref 3.4–5.3)
PROT SERPL-MCNC: 7.5 G/DL (ref 6.8–8.8)
RBC # BLD AUTO: 4.68 10E12/L (ref 4.4–5.9)
SODIUM SERPL-SCNC: 140 MMOL/L (ref 133–144)
TROPONIN I SERPL-MCNC: <0.015 UG/L (ref 0–0.04)
WBC # BLD AUTO: 7.1 10E9/L (ref 4–11)

## 2021-03-15 PROCEDURE — 99284 EMERGENCY DEPT VISIT MOD MDM: CPT | Mod: 25 | Performed by: FAMILY MEDICINE

## 2021-03-15 PROCEDURE — 83880 ASSAY OF NATRIURETIC PEPTIDE: CPT | Performed by: EMERGENCY MEDICINE

## 2021-03-15 PROCEDURE — 71045 X-RAY EXAM CHEST 1 VIEW: CPT

## 2021-03-15 PROCEDURE — 85025 COMPLETE CBC W/AUTO DIFF WBC: CPT | Performed by: EMERGENCY MEDICINE

## 2021-03-15 PROCEDURE — 93005 ELECTROCARDIOGRAM TRACING: CPT | Performed by: FAMILY MEDICINE

## 2021-03-15 PROCEDURE — 93010 ELECTROCARDIOGRAM REPORT: CPT | Performed by: FAMILY MEDICINE

## 2021-03-15 PROCEDURE — 250N000009 HC RX 250: Performed by: FAMILY MEDICINE

## 2021-03-15 PROCEDURE — 250N000013 HC RX MED GY IP 250 OP 250 PS 637: Performed by: FAMILY MEDICINE

## 2021-03-15 PROCEDURE — 80048 BASIC METABOLIC PNL TOTAL CA: CPT | Performed by: EMERGENCY MEDICINE

## 2021-03-15 PROCEDURE — 80076 HEPATIC FUNCTION PANEL: CPT | Performed by: EMERGENCY MEDICINE

## 2021-03-15 PROCEDURE — 84484 ASSAY OF TROPONIN QUANT: CPT | Performed by: EMERGENCY MEDICINE

## 2021-03-15 PROCEDURE — 99285 EMERGENCY DEPT VISIT HI MDM: CPT | Mod: 25 | Performed by: FAMILY MEDICINE

## 2021-03-15 RX ORDER — SENNOSIDES 8.6 MG
1300 CAPSULE ORAL 2 TIMES DAILY
COMMUNITY

## 2021-03-15 RX ORDER — FAMOTIDINE 20 MG/1
20 TABLET, FILM COATED ORAL 2 TIMES DAILY
Qty: 20 TABLET | Refills: 0 | Status: SHIPPED | OUTPATIENT
Start: 2021-03-15 | End: 2021-03-25

## 2021-03-15 RX ADMIN — LIDOCAINE HYDROCHLORIDE 30 ML: 20 SOLUTION ORAL; TOPICAL at 13:34

## 2021-03-15 NOTE — ED TRIAGE NOTES
Pt presents with chest pain and dizziness. Pt states started yesterday at rest. 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 History of heart in family. History of hypertension. Diabetes. High cholesterol.

## 2021-03-15 NOTE — ED PROVIDER NOTES
History     Chief Complaint   Patient presents with     Chest Pain     Dizziness. Started yesterday.      HPI  Kalia Boateng is a 56 year old male who presents with chest pain this been going on since yesterday.  Patient states this started at home when he was just at rest.  The pain seems to go from his sternum up to his neck and jaw area.  States that when he went to bed the pain was still there and when he woke up he still had the pain.  He got concerned because when he was at work, he started to feel little lightheaded.  Denies any shortness of breath.  Denies any nausea or vomiting.  He has not had pain like this before.  He did try some Tums last night but this did not help much.    Allergies:  Allergies   Allergen Reactions     No Known Drug Allergies        Problem List:    Patient Active Problem List    Diagnosis Date Noted     Type 2 diabetes mellitus with diabetic nephropathy, with long-term current use of insulin (H) 12/04/2017     Priority: Medium     Multiple thyroid nodules 12/04/2017     Priority: Medium     Strain of neck muscle, subsequent encounter 08/24/2016     Priority: Medium     CAD (coronary artery disease) 08/16/2016     Priority: Medium     2010 coronary CT angiogram, which was a technically difficult study but was concerning for obstructive CAD.  Subsequent coronary angiogram showed moderate narrowing of diagonal branch and very terminal branch of circumflex vessel.         Sleep-related hypoventilation 07/14/2016     Priority: Medium     CYRIL (obstructive sleep apnea) Severe 07/12/2016     Priority: Medium     Disc disorder of cervical region 03/20/2015     Priority: Medium     Work Comp injury       Counseling regarding advanced directives 04/21/2014     Priority: Medium     Forms given to patient on 4/21/14.  Tamiko River CMA         Essential hypertension with goal blood pressure less than 140/90 05/25/2011     Priority: Medium     Osteoarthritis 07/10/2008     Priority: Medium      Idiopathic chronic gout of multiple sites without tophus 01/30/2006     Priority: Medium     Problem list name updated by automated process. Provider to review       Proteinuria 09/24/2004     Priority: Medium     Morbid obesity, unspecified obesity type (H) 08/06/2004     Priority: Medium        Past Medical History:    Past Medical History:   Diagnosis Date     AC joint arthropathy 11/6/2015     Closed fracture of shaft of left humerus 7/27/2015     Closed fracture of shaft of left humerus with routine healing 10/9/2015     Diabetic eye exam (H) 09/07/11     Dysfunction of left rotator cuff 10/9/2015     Essential hypertension      Gout      NONSPECIFIC MEDICAL HISTORY 1980     CYRIL (obstructive sleep apnea) 2011     Osteoarthrosis, unspecified whether generalized or localized, lower leg      Other and unspecified hyperlipidemia      Proteinuria      Type II or unspecified type diabetes mellitus without mention of complication, not stated as uncontrolled        Past Surgical History:    Past Surgical History:   Procedure Laterality Date     EXCISE LESION FACE Right 1/24/2017    Procedure: EXCISE LESION FACE;  Surgeon: Bhanu Graham MD;  Location: [facility] OR     HC ARTHROTOMY W/OPEN MENISCUS REPAIR  12/05/2002    1)Arthroscopic partial medial meniscectomy, left knee.  2)Chondroplasty, medial femoral condyle, left knee.  3)Debridement of medial plica, arthroscopic, left knee.      KNEE SCOPE, DIAGNOSTIC      Arthroscopy, Knee     HC KNEE SCOPE,MED/LAT MENISECTOMY  1/26/2005     Arthroscopic partial medial meniscectomy, right knee.     HC REPAIR ROTATOR CUFF,ACUTE  1990's    Rt Rotator Cuff Repair     HC VASECTOMY UNILAT/BILAT W POSTOP SEMEN  1991    Vasectomy     INJECT JOINT SACROILIAC Bilateral 6/24/2015    Procedure: INJECT JOINT SACROILIAC;  Surgeon: James Leahy MD;  Location: [facility] OR     JOINT REPLACEMTN, KNEE RT/LT  11/11/09    Medial unicompartmental arthroplasty, left knee.       Family History:     Family History   Problem Relation Age of Onset     Lipids Mother      Hypertension Mother      Cancer Paternal Uncle         x3     Heart Disease Father        Social History:  Marital Status:   [2]  Social History     Tobacco Use     Smoking status: Former Smoker     Packs/day: 0.50     Years: 28.00     Pack years: 14.00     Types: Cigarettes     Quit date: 2010     Years since quittin.2     Smokeless tobacco: Former User     Types: Chew     Quit date: 2009   Substance Use Topics     Alcohol use: Yes     Alcohol/week: 1.7 standard drinks     Types: 2 Cans of beer per week     Drug use: No        Medications:    acetaminophen (ACETAMINOPHEN 8 HOUR) 650 MG CR tablet  allopurinol (ZYLOPRIM) 300 MG tablet  amLODIPine (NORVASC) 5 MG tablet  aspirin 81 MG EC tablet  atorvastatin (LIPITOR) 40 MG tablet  B-D U/F 31G X 8 MM insulin pen needle  blood glucose (ACCU-CHEK GUIDE) test strip  blood glucose monitoring (ACCU-CHEK FASTCLIX) lancets  empagliflozin (JARDIANCE) 10 MG TABS tablet  famotidine (PEPCID) 20 MG tablet  insulin aspart (NOVOLOG FLEXPEN) 100 UNIT/ML pen  insulin degludec (TRESIBA FLEXTOUCH) 200 UNIT/ML pen  lisinopril (ZESTRIL) 40 MG tablet  meloxicam (MOBIC) 15 MG tablet  metFORMIN (GLUCOPHAGE-XR) 500 MG 24 hr tablet  spironolactone (ALDACTONE) 25 MG tablet  chlorthalidone (HYGROTON) 25 MG tablet  doxazosin (CARDURA) 4 MG tablet  order for DME  ORDER FOR DME          Review of Systems   All other systems reviewed and are negative.      Physical Exam   BP: (!) 164/98  Pulse: 90  Temp: 97.4  F (36.3  C)  Resp: 16  Weight: 138.4 kg (305 lb 3.2 oz)  SpO2: 99 %      Physical Exam  Vitals signs and nursing note reviewed.   Constitutional:       General: He is not in acute distress.     Appearance: He is well-developed. He is not diaphoretic.   HENT:      Head: Normocephalic and atraumatic.      Nose: Nose normal.      Mouth/Throat:      Pharynx: No oropharyngeal exudate.   Eyes:      General:  No scleral icterus.     Conjunctiva/sclera: Conjunctivae normal.      Pupils: Pupils are equal, round, and reactive to light.   Neck:      Musculoskeletal: Normal range of motion.   Cardiovascular:      Rate and Rhythm: Normal rate and regular rhythm.      Heart sounds: Normal heart sounds. No murmur. No friction rub.   Pulmonary:      Effort: Pulmonary effort is normal. No respiratory distress.      Breath sounds: Normal breath sounds. No wheezing or rales.   Abdominal:      General: Bowel sounds are normal. There is no distension.      Palpations: Abdomen is soft. There is no mass.      Tenderness: There is no abdominal tenderness. There is no guarding or rebound.   Musculoskeletal: Normal range of motion.         General: No tenderness.   Skin:     General: Skin is warm.      Findings: No rash.   Neurological:      Mental Status: He is alert and oriented to person, place, and time.   Psychiatric:         Judgment: Judgment normal.         ED Course        Procedures         EKG Interpretation:      Interpreted by Fransisco Oleary  Time reviewed: now   Symptoms at time of EKG: now   Rhythm: normal sinus   Rate: normal  Axis: NORMAL  Ectopy: none  Conduction: normal  ST Segments/ T Waves: No ST-T wave changes  Q Waves: none  Comparison to prior: No old EKG available    Clinical Impression: normal EKG      Results for orders placed or performed during the hospital encounter of 03/15/21 (from the past 24 hour(s))   Troponin I   Result Value Ref Range    Troponin I ES <0.015 0.000 - 0.045 ug/L   Basic metabolic panel   Result Value Ref Range    Sodium 140 133 - 144 mmol/L    Potassium 3.8 3.4 - 5.3 mmol/L    Chloride 106 94 - 109 mmol/L    Carbon Dioxide 28 20 - 32 mmol/L    Anion Gap 6 3 - 14 mmol/L    Glucose 119 (H) 70 - 99 mg/dL    Urea Nitrogen 29 7 - 30 mg/dL    Creatinine 1.21 0.66 - 1.25 mg/dL    GFR Estimate 66 >60 mL/min/[1.73_m2]    GFR Estimate If Black 76 >60 mL/min/[1.73_m2]    Calcium 9.3 8.5 -  10.1 mg/dL   CBC with platelets differential   Result Value Ref Range    WBC 7.1 4.0 - 11.0 10e9/L    RBC Count 4.68 4.4 - 5.9 10e12/L    Hemoglobin 14.2 13.3 - 17.7 g/dL    Hematocrit 43.0 40.0 - 53.0 %    MCV 92 78 - 100 fl    MCH 30.3 26.5 - 33.0 pg    MCHC 33.0 31.5 - 36.5 g/dL    RDW 14.0 10.0 - 15.0 %    Platelet Count 167 150 - 450 10e9/L    Diff Method Automated Method     % Neutrophils 67.2 %    % Lymphocytes 19.7 %    % Monocytes 5.2 %    % Eosinophils 6.9 %    % Basophils 0.6 %    % Immature Granulocytes 0.4 %    Nucleated RBCs 0 0 /100    Absolute Neutrophil 4.8 1.6 - 8.3 10e9/L    Absolute Lymphocytes 1.4 0.8 - 5.3 10e9/L    Absolute Monocytes 0.4 0.0 - 1.3 10e9/L    Absolute Eosinophils 0.5 0.0 - 0.7 10e9/L    Absolute Basophils 0.0 0.0 - 0.2 10e9/L    Abs Immature Granulocytes 0.0 0 - 0.4 10e9/L    Absolute Nucleated RBC 0.0    Hepatic panel   Result Value Ref Range    Bilirubin Direct <0.1 0.0 - 0.2 mg/dL    Bilirubin Total 0.3 0.2 - 1.3 mg/dL    Albumin 3.5 3.4 - 5.0 g/dL    Protein Total 7.5 6.8 - 8.8 g/dL    Alkaline Phosphatase 118 40 - 150 U/L    ALT 38 0 - 70 U/L    AST 24 0 - 45 U/L   Nt probnp inpatient   Result Value Ref Range    N-Terminal Pro BNP Inpatient 17 0 - 900 pg/mL   XR Chest Port 1 View    Narrative    CHEST ONE VIEW PORTABLE  3/15/2021 12:49 PM     HISTORY: Chest pain.    COMPARISON: Chest x-rays dated 4/28/2015.    FINDINGS:  Costophrenic angles are not completely included and cannot  be completely evaluated. Lungs are grossly clear. Heart size,  mediastinum and pulmonary vascularity are within normal limits given  technique. No pneumothorax or significant pleural fluid collection is  identified. Evaluation of pleural fluid is somewhat limited as the  lateral costophrenic angles are not completely included.      Impression    IMPRESSION: No evidence of acute cardiopulmonary disease is seen.  Lateral costophrenic angles are not completely included and cannot be  completely  evaluated.       Medications   lidocaine (XYLOCAINE) 2 % 15 mL, alum & mag hydroxide-simethicone (MAALOX) 15 mL GI Cocktail (30 mLs Oral Given 3/15/21 1334)     Labs are reviewed and were unremarkable including a negative troponin.  With patient having symptoms since yesterday and this pain negative, this is reassuring that is not likely cardiac.  I think that this could be more acid reflux with the pain radiating from his chest up to his neck and jaw.  I do not think likely dissection as x-ray did not show widened mediastinum and patient has relatively normal blood pressures.  We will discharge the patient home on a 10-day course of Pepcid to see if this helps.  Patient will follow up with his doctor later on this week if things or not improving.    Assessments & Plan (with Medical Decision Making)  Atypical chest pain     I have reviewed the nursing notes.    I have reviewed the findings, diagnosis, plan and need for follow up with the patient.      New Prescriptions    FAMOTIDINE (PEPCID) 20 MG TABLET    Take 1 tablet (20 mg) by mouth 2 times daily for 10 days       Final diagnoses:   Atypical chest pain       3/15/2021   [facility] EMERGENCY DEPT     Fransisco Oleary MD  03/15/21 [id number]

## 2021-03-22 ENCOUNTER — TELEPHONE (OUTPATIENT)
Dept: ENDOCRINOLOGY | Facility: CLINIC | Age: 56
End: 2021-03-22

## 2021-03-22 NOTE — TELEPHONE ENCOUNTER
3/22 Provided phone number [phone] to schedule follow up  in about 6 months (around 7/19/2021) for nonfasting labs, diabetes educator herman Valera   Procedure    Ortho/Sports Med/Pod/Ent/Eye  [facility]   [phone]

## 2021-03-22 NOTE — LETTER
May 6, 2021      Kalia Boateng  [address] [address] [place] [id number]        Dear Kalia Boateng,    In order to ensure that we are providing the best quality care, we would like to remind you that you are due for a follow up appointment with Dr. Ferreira around 7/19/21.  You are also due for an appointment with a diabetes educator and a lab visit.    We have been unable to reach you by phone. Please call your clinic or use [employer] to make an appointment with your provider before you run out of medication. This will prevent a delay in your next refill. Please let us know if you have any questions and we would be happy to help.     Thank you for trusting us with your care.    Sincerely,     [employer] [facility]  [phone]

## 2021-03-25 ENCOUNTER — VIRTUAL VISIT (OUTPATIENT)
Dept: FAMILY MEDICINE | Facility: CLINIC | Age: 56
End: 2021-03-25
Payer: COMMERCIAL

## 2021-03-25 DIAGNOSIS — R07.89 ATYPICAL CHEST PAIN: Primary | ICD-10-CM

## 2021-03-25 DIAGNOSIS — E66.01 MORBID OBESITY, UNSPECIFIED OBESITY TYPE (H): Chronic | ICD-10-CM

## 2021-03-25 DIAGNOSIS — Z79.4 TYPE 2 DIABETES MELLITUS WITH DIABETIC NEPHROPATHY, WITH LONG-TERM CURRENT USE OF INSULIN (H): ICD-10-CM

## 2021-03-25 DIAGNOSIS — E11.21 TYPE 2 DIABETES MELLITUS WITH DIABETIC NEPHROPATHY, WITH LONG-TERM CURRENT USE OF INSULIN (H): ICD-10-CM

## 2021-03-25 PROCEDURE — 99213 OFFICE O/P EST LOW 20 MIN: CPT | Mod: GT | Performed by: FAMILY MEDICINE

## 2021-03-25 NOTE — PROGRESS NOTES
Kalia is a 56 year old who is being evaluated via a billable video visit.      How would you like to obtain your AVS? MyChart  If the video visit is dropped, the invitation should be resent by: Other e-mail:   [email or web address]  Will anyone else be joining your video visit?wife Samara will be there        Video Start Time: 9:48 AM    Assessment & Plan     ASSESSMENT/ORDERS:    ICD-10-CM    1. Atypical chest pain  R07.89    2. Morbid obesity, unspecified obesity type (H)  E66.01    3. Type 2 diabetes mellitus with diabetic nephropathy, with long-term current use of insulin (H)  E11.21     Z79.4      PLAN:  1.  Chest pain continues to be resolved.  Not clear the cause, but cardiac cause essentially ruled out for now.  Follow-up as needed.  2.  Discussed COVID-19 vaccine questions.  He is in current group that can be vaccinated given his obesity and diabetes.  He is actively working on scheduling appointment.  Continue diabetes management with endocrinologist.                  Return in about 4 weeks (around 4/22/2021) for recheck if symptoms worsen or fail to improve.    Scottie Boudreaux MD  [facility]   Kalia is a 56 year old who presents for the following health issues     HPI     ED/UC Followup:    Facility:  [facility]  Date of visit: 3/15/2021  Reason for visit: chest pain   Current Status: Dx with acid reflux.          Had sharp stabbing chest pain that resolved a few hours after visit.  Had complete workup to rule out MI.  Has not had return of pain since.  No chest pressure or exertional symptoms.      History of diabetes and obesity.  Continues to work with endocrinology on these issues.    Review of Systems         Objective           Vitals:  No vitals were obtained today due to virtual visit.    Physical Exam   GENERAL: Healthy, alert and no distress  EYES: Eyes grossly normal to inspection.  No discharge or erythema, or obvious scleral/conjunctival  abnormalities.  RESP: No audible wheeze, cough, or visible cyanosis.  No visible retractions or increased work of breathing.    SKIN: Visible skin clear. No significant rash, abnormal pigmentation or lesions.  NEURO: Cranial nerves grossly intact.  Mentation and speech appropriate for age.  PSYCH: Mentation appears normal, affect normal/bright, judgement and insight intact, normal speech and appearance well-groomed.                Video-Visit Details    Type of service:  Video Visit    Video End Time:10:10 AM    Originating Location (pt. Location): Home    Distant Location (provider location):  [facility]     Platform used for Video Visit: [employer]

## 2021-04-07 ENCOUNTER — IMMUNIZATION (OUTPATIENT)
Dept: FAMILY MEDICINE | Facility: CLINIC | Age: 56
End: 2021-04-07
Payer: COMMERCIAL

## 2021-04-07 PROCEDURE — 91301 PR COVID VAC MODERNA 100 MCG/0.5 ML IM: CPT

## 2021-04-07 PROCEDURE — 0011A PR COVID VAC MODERNA 100 MCG/0.5 ML IM: CPT

## 2021-04-22 NOTE — TELEPHONE ENCOUNTER
4/22 2nd attempt. LVM for patient to schedule:    1)  Follow up diabetes return visit with Dr. Ferreira around 7/19/21.    2)  A diabetes educator appointment    3)  Non fasting labs per Hanna      Please assist patient in scheduling when they call back.      Louise Garcia  Pediatric Specialty    [facility] [place]

## 2021-05-05 ENCOUNTER — IMMUNIZATION (OUTPATIENT)
Dept: FAMILY MEDICINE | Facility: CLINIC | Age: 56
End: 2021-05-05
Attending: FAMILY MEDICINE
Payer: COMMERCIAL

## 2021-05-05 PROCEDURE — 0012A PR COVID VAC MODERNA 100 MCG/0.5 ML IM: CPT

## 2021-05-05 PROCEDURE — 91301 PR COVID VAC MODERNA 100 MCG/0.5 ML IM: CPT

## 2021-05-06 NOTE — TELEPHONE ENCOUNTER
5/6 3rd attempt to schedule.  Chart letter created and sent.    Louise Garcia  Pediatric Specialty    [facility]

## 2021-06-09 DIAGNOSIS — Z79.4 TYPE 2 DIABETES MELLITUS WITH COMPLICATION, WITH LONG-TERM CURRENT USE OF INSULIN (H): ICD-10-CM

## 2021-06-09 DIAGNOSIS — Z79.4 TYPE 2 DIABETES MELLITUS WITH DIABETIC NEPHROPATHY, WITH LONG-TERM CURRENT USE OF INSULIN (H): ICD-10-CM

## 2021-06-09 DIAGNOSIS — E11.21 TYPE 2 DIABETES MELLITUS WITH DIABETIC NEPHROPATHY, WITH LONG-TERM CURRENT USE OF INSULIN (H): ICD-10-CM

## 2021-06-09 DIAGNOSIS — E11.8 TYPE 2 DIABETES MELLITUS WITH COMPLICATION, WITH LONG-TERM CURRENT USE OF INSULIN (H): ICD-10-CM

## 2021-06-09 RX ORDER — BLOOD SUGAR DIAGNOSTIC
STRIP MISCELLANEOUS
Qty: 400 STRIP | Refills: 0 | Status: SHIPPED | OUTPATIENT
Start: 2021-06-09 | End: 2021-09-22

## 2021-06-09 RX ORDER — INSULIN DEGLUDEC 200 U/ML
INJECTION, SOLUTION SUBCUTANEOUS
Qty: 45 ML | Refills: 2 | Status: SHIPPED | OUTPATIENT
Start: 2021-06-09 | End: 2022-05-20

## 2021-06-09 NOTE — TELEPHONE ENCOUNTER
TRESIBA FLEXTOUCH PEN 3ML 3'S 200U/ML      Last Written Prescription Date:  8-25-20  Last Fill Quantity: 45 ml,   # refills: 1  Last Office Visit : 1-19-21  Future Office visit:  none    Routing refill request to provider for review/approval because:  Insulin - refilled per clinic

## 2021-07-20 DIAGNOSIS — M1A.09X0 IDIOPATHIC CHRONIC GOUT OF MULTIPLE SITES WITHOUT TOPHUS: ICD-10-CM

## 2021-07-20 DIAGNOSIS — I10 BENIGN ESSENTIAL HYPERTENSION: ICD-10-CM

## 2021-07-21 ENCOUNTER — MYC MEDICAL ADVICE (OUTPATIENT)
Dept: FAMILY MEDICINE | Facility: CLINIC | Age: 56
End: 2021-07-21

## 2021-07-23 ENCOUNTER — TELEPHONE (OUTPATIENT)
Dept: ENDOCRINOLOGY | Facility: CLINIC | Age: 56
End: 2021-07-23

## 2021-07-23 RX ORDER — AMLODIPINE BESYLATE 5 MG/1
5 TABLET ORAL DAILY
Qty: 90 TABLET | Refills: 0 | Status: SHIPPED | OUTPATIENT
Start: 2021-07-23 | End: 2021-10-21

## 2021-07-23 RX ORDER — ALLOPURINOL 300 MG/1
1 TABLET ORAL DAILY
Qty: 90 TABLET | Refills: 1 | Status: SHIPPED | OUTPATIENT
Start: 2021-07-23 | End: 2022-03-08

## 2021-07-23 NOTE — TELEPHONE ENCOUNTER
7/23 Called and left voicemail. Provided phone number [phone] to schedule follow up with Dr. Ferreira first available.     Myrna silva Procedure   Orthopedics, Podiatry, Sports Medicine, ENT/Eye Specialties  [facility]   [phone]      ----- Message from Lesli Stevenson RN sent at 7/21/2021  9:18 AM CDT -----  Regarding: Follow-up  Hello,    Please reach out to patient to schedule his follow-up with Dr. Ferreira.    Thank you,  [place]

## 2021-07-23 NOTE — TELEPHONE ENCOUNTER
Routing to team to set up F2F appointment for patient for yearly.    Routing refill request to provider for review/approval because:  Labs out of range:  Uric Acid - needs to be under 6.0 for RN to sign  Uric Acid   Date Value Ref Range Status   07/19/2020 7.0 3.5 - 7.2 mg/dL Final     Last Written Prescription Date:  7/9/2020  Last Fill Quantity: 90,  # refills: 4   Last office visit: 11/5/2020 with prescribing provider:     Future Office Visit:        Yee Lopez, BERNARDAN, RN

## 2021-07-30 NOTE — TELEPHONE ENCOUNTER
7/30 2nd attempt.  LVM for patient to schedule a follow up 'diabetes return' visit with Dr. Ferreira at patient's earliest convenience.      Please assist patient in scheduling when he calls back.    Thanks,    Louise Garcia  Pediatric Specialty /Adult Endocrinology  [facility]

## 2021-08-05 RX ORDER — ATORVASTATIN CALCIUM 40 MG/1
40 TABLET, FILM COATED ORAL DAILY
Qty: 90 TABLET | Refills: 0 | Status: SHIPPED | OUTPATIENT
Start: 2021-08-05 | End: 2021-09-22

## 2021-08-05 NOTE — TELEPHONE ENCOUNTER
Routing to team to set up F2F appointment for patient for diabetes and HTN.  Patient has been given #90 to get to appointment per triage protocol.

## 2021-08-28 ENCOUNTER — HEALTH MAINTENANCE LETTER (OUTPATIENT)
Age: 56
End: 2021-08-28

## 2021-09-21 NOTE — PROGRESS NOTES
Outcome for 09/21/21 12:52 PM :[employer] message sent to patient for blood sugar readings.    Marly Bravo [facility]  Adult Endocrinology  [facility]

## 2021-09-22 ENCOUNTER — OFFICE VISIT (OUTPATIENT)
Dept: ENDOCRINOLOGY | Facility: CLINIC | Age: 56
End: 2021-09-22
Payer: COMMERCIAL

## 2021-09-22 VITALS
WEIGHT: 302.5 LBS | OXYGEN SATURATION: 96 % | BODY MASS INDEX: 43.4 KG/M2 | SYSTOLIC BLOOD PRESSURE: 143 MMHG | DIASTOLIC BLOOD PRESSURE: 75 MMHG | HEART RATE: 74 BPM

## 2021-09-22 DIAGNOSIS — E04.2 MULTIPLE THYROID NODULES: ICD-10-CM

## 2021-09-22 DIAGNOSIS — R80.1 PERSISTENT PROTEINURIA: ICD-10-CM

## 2021-09-22 DIAGNOSIS — Z23 NEED FOR PROPHYLACTIC VACCINATION AND INOCULATION AGAINST INFLUENZA: ICD-10-CM

## 2021-09-22 DIAGNOSIS — E26.9 HYPERALDOSTERONISM (H): ICD-10-CM

## 2021-09-22 DIAGNOSIS — E66.01 MORBID OBESITY, UNSPECIFIED OBESITY TYPE (H): ICD-10-CM

## 2021-09-22 LAB — HBA1C MFR BLD: 7.5 % (ref 0–5.7)

## 2021-09-22 PROCEDURE — 83036 HEMOGLOBIN GLYCOSYLATED A1C: CPT | Performed by: INTERNAL MEDICINE

## 2021-09-22 PROCEDURE — 90471 IMMUNIZATION ADMIN: CPT | Performed by: INTERNAL MEDICINE

## 2021-09-22 PROCEDURE — 99214 OFFICE O/P EST MOD 30 MIN: CPT | Mod: 25 | Performed by: INTERNAL MEDICINE

## 2021-09-22 PROCEDURE — 90682 RIV4 VACC RECOMBINANT DNA IM: CPT | Performed by: INTERNAL MEDICINE

## 2021-09-22 RX ORDER — ATORVASTATIN CALCIUM 40 MG/1
40 TABLET, FILM COATED ORAL DAILY
Qty: 90 TABLET | Refills: 3 | Status: SHIPPED | OUTPATIENT
Start: 2021-09-22 | End: 2022-03-16

## 2021-09-22 RX ORDER — BLOOD SUGAR DIAGNOSTIC
STRIP MISCELLANEOUS
Qty: 400 STRIP | Refills: 3 | Status: SHIPPED | OUTPATIENT
Start: 2021-09-22

## 2021-09-22 RX ORDER — LISINOPRIL 40 MG/1
40 TABLET ORAL AT BEDTIME
Qty: 90 TABLET | Refills: 4 | Status: SHIPPED | OUTPATIENT
Start: 2021-09-22 | End: 2022-05-25

## 2021-09-22 RX ORDER — INSULIN ASPART 100 [IU]/ML
24 INJECTION, SOLUTION INTRAVENOUS; SUBCUTANEOUS DAILY
Qty: 27 ML | Refills: 3 | Status: SHIPPED | OUTPATIENT
Start: 2021-09-22 | End: 2022-05-25

## 2021-09-22 RX ORDER — IBUPROFEN 600 MG/1
TABLET ORAL
Qty: 1 KIT | Refills: 3 | Status: SHIPPED | OUTPATIENT
Start: 2021-09-22 | End: 2023-10-25
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
## 2021-09-22 NOTE — NURSING NOTE
Kalia Boateng's goals for this visit include:   Chief Complaint   Patient presents with     Diabetes     He requests these members of his care team be copied on today's visit information: Yes    PCP: Scottie Boudreaux    Referring Provider:  Scottie Boudreaux MD  [address] [place] [id number]    BP (!) 143/75 (BP Location: Left arm, Patient Position: Sitting, Cuff Size: Adult Large)   Pulse 74   Wt 137.2 kg (302 lb 8 oz)   SpO2 96%   BMI 43.40 kg/m      Do you need any medication refills at today's visit? Yes
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
## 2021-10-18 DIAGNOSIS — I10 BENIGN ESSENTIAL HYPERTENSION: ICD-10-CM

## 2021-10-20 NOTE — TELEPHONE ENCOUNTER
Norvasc  Routing refill request to provider for review/approval because:  BP failed RN refill protocol    Nia Durbin RN

## 2021-10-21 RX ORDER — AMLODIPINE BESYLATE 5 MG/1
TABLET ORAL
Qty: 90 TABLET | Refills: 3 | Status: SHIPPED | OUTPATIENT
Start: 2021-10-21 | End: 2022-03-11

## 2021-12-08 RX ORDER — PEN NEEDLE, DIABETIC 31 GX5/16"
NEEDLE, DISPOSABLE MISCELLANEOUS
Qty: 200 EACH | Refills: 3 | Status: SHIPPED | OUTPATIENT
Start: 2021-12-08 | End: 2022-05-25

## 2021-12-16 DIAGNOSIS — M19.91 PRIMARY OSTEOARTHRITIS, UNSPECIFIED SITE: ICD-10-CM

## 2021-12-17 RX ORDER — MELOXICAM 15 MG/1
TABLET ORAL
Qty: 90 TABLET | Refills: 3 | Status: SHIPPED | OUTPATIENT
Start: 2021-12-17 | End: 2022-03-11

## 2021-12-17 NOTE — TELEPHONE ENCOUNTER
Routing refill request to provider for review/approval because:  BP out of range      Rose Langley,RN

## 2022-01-17 DIAGNOSIS — I10 ESSENTIAL HYPERTENSION: ICD-10-CM

## 2022-01-19 RX ORDER — SPIRONOLACTONE 25 MG/1
TABLET ORAL
Qty: 30 TABLET | Refills: 0 | Status: SHIPPED | OUTPATIENT
Start: 2022-01-19 | End: 2022-03-07

## 2022-01-19 NOTE — TELEPHONE ENCOUNTER
Laya refill given patient is due for follow-up office visit and physical will need appointment for further refills face-to-face with primary care provider    Thank you  Madison Peralta CNP

## 2022-01-19 NOTE — TELEPHONE ENCOUNTER
Pending Prescriptions:                       Disp   Refills    spironolactone (ALDACTONE) 25 MG tablet [P*90 tab*3        Sig: TAKE 1 TABLET DAILY    Routing refill request to provider for review/approval because:  Labs out of range:    BP Readings from Last 3 Encounters:   09/22/21 (!) 143/75   03/15/21 134/68   11/05/20 128/76

## 2022-02-02 DIAGNOSIS — I10 ESSENTIAL HYPERTENSION WITH GOAL BLOOD PRESSURE LESS THAN 140/90: ICD-10-CM

## 2022-02-04 RX ORDER — CHLORTHALIDONE 25 MG/1
25 TABLET ORAL DAILY
Qty: 90 TABLET | Refills: 0 | Status: SHIPPED | OUTPATIENT
Start: 2022-02-04 | End: 2022-05-10

## 2022-02-04 NOTE — TELEPHONE ENCOUNTER
CHLORTHALIDONE TABS 25MG      Last Written Prescription Date: 7/22/21  Last Fill Quantity: 90, # refills: 1    Last Office Visit :  9/22/21   Next Office Visit: 5/25/22    BP Readings from Last 3 Encounters:   09/22/21 (!) 143/75   03/15/21 134/68   11/05/20 128/76     90 day refill provided per protocol, routed to provider

## 2022-02-12 ENCOUNTER — HEALTH MAINTENANCE LETTER (OUTPATIENT)
Age: 57
End: 2022-02-12

## 2022-02-17 ENCOUNTER — TRANSFERRED RECORDS (OUTPATIENT)
Dept: HEALTH INFORMATION MANAGEMENT | Facility: CLINIC | Age: 57
End: 2022-02-17
Payer: COMMERCIAL

## 2022-02-17 LAB — RETINOPATHY: NORMAL

## 2022-02-18 ENCOUNTER — TELEPHONE (OUTPATIENT)
Dept: ENDOCRINOLOGY | Facility: CLINIC | Age: 57
End: 2022-02-18
Payer: COMMERCIAL

## 2022-02-18 NOTE — TELEPHONE ENCOUNTER
Fax received from [employer] regarding patient's diabetic eye exam. Report has been sent to scanning.    Lake RG MA   [facility]

## 2022-03-06 DIAGNOSIS — I10 ESSENTIAL HYPERTENSION: ICD-10-CM

## 2022-03-06 DIAGNOSIS — M1A.09X0 IDIOPATHIC CHRONIC GOUT OF MULTIPLE SITES WITHOUT TOPHUS: ICD-10-CM

## 2022-03-07 RX ORDER — SPIRONOLACTONE 25 MG/1
TABLET ORAL
Qty: 30 TABLET | Refills: 11 | Status: SHIPPED | OUTPATIENT
Start: 2022-03-07 | End: 2022-03-11

## 2022-03-07 NOTE — TELEPHONE ENCOUNTER
Pending Prescriptions:                       Disp   Refills    spironolactone (ALDACTONE) 25 MG tablet [P*30 tab*11       Sig: TAKE 1 TABLET DAILY    Routing refill request to provider for review/approval because:  Labs out of range:  BP  BP Readings from Last 3 Encounters:   09/22/21 (!) 143/75   03/15/21 134/68   11/05/20 128/76

## 2022-03-08 RX ORDER — ALLOPURINOL 300 MG/1
TABLET ORAL
Qty: 90 TABLET | Refills: 0 | Status: SHIPPED | OUTPATIENT
Start: 2022-03-08 | End: 2022-03-11

## 2022-03-08 NOTE — TELEPHONE ENCOUNTER
Routing refill request to provider for review/approval because:  Labs not current:  Uric Acid      Rose LangleyRN

## 2022-03-11 ENCOUNTER — OFFICE VISIT (OUTPATIENT)
Dept: FAMILY MEDICINE | Facility: CLINIC | Age: 57
End: 2022-03-11
Payer: COMMERCIAL

## 2022-03-11 VITALS
BODY MASS INDEX: 42.56 KG/M2 | SYSTOLIC BLOOD PRESSURE: 138 MMHG | DIASTOLIC BLOOD PRESSURE: 76 MMHG | OXYGEN SATURATION: 96 % | WEIGHT: 296.6 LBS | RESPIRATION RATE: 16 BRPM | HEART RATE: 76 BPM | TEMPERATURE: 98.1 F

## 2022-03-11 DIAGNOSIS — Z79.4 TYPE 2 DIABETES MELLITUS WITH DIABETIC NEPHROPATHY, WITH LONG-TERM CURRENT USE OF INSULIN (H): ICD-10-CM

## 2022-03-11 DIAGNOSIS — E66.01 MORBID OBESITY, UNSPECIFIED OBESITY TYPE (H): Chronic | ICD-10-CM

## 2022-03-11 DIAGNOSIS — N18.2 STAGE 2 CHRONIC KIDNEY DISEASE: ICD-10-CM

## 2022-03-11 DIAGNOSIS — E11.21 TYPE 2 DIABETES MELLITUS WITH DIABETIC NEPHROPATHY, WITH LONG-TERM CURRENT USE OF INSULIN (H): ICD-10-CM

## 2022-03-11 DIAGNOSIS — R80.1 PERSISTENT PROTEINURIA: ICD-10-CM

## 2022-03-11 DIAGNOSIS — M1A.09X0 IDIOPATHIC CHRONIC GOUT OF MULTIPLE SITES WITHOUT TOPHUS: ICD-10-CM

## 2022-03-11 DIAGNOSIS — Z23 HIGH PRIORITY FOR 2019-NCOV VACCINE: ICD-10-CM

## 2022-03-11 DIAGNOSIS — I10 BENIGN ESSENTIAL HYPERTENSION: Primary | ICD-10-CM

## 2022-03-11 DIAGNOSIS — M19.91 PRIMARY OSTEOARTHRITIS, UNSPECIFIED SITE: ICD-10-CM

## 2022-03-11 PROCEDURE — 90471 IMMUNIZATION ADMIN: CPT | Performed by: FAMILY MEDICINE

## 2022-03-11 PROCEDURE — 0064A COVID-19,PF,MODERNA (18+ YRS BOOSTER .25ML): CPT | Performed by: FAMILY MEDICINE

## 2022-03-11 PROCEDURE — 91306 COVID-19,PF,MODERNA (18+ YRS BOOSTER .25ML): CPT | Performed by: FAMILY MEDICINE

## 2022-03-11 PROCEDURE — 99214 OFFICE O/P EST MOD 30 MIN: CPT | Mod: 25 | Performed by: FAMILY MEDICINE

## 2022-03-11 PROCEDURE — 90636 HEP A/HEP B VACC ADULT IM: CPT | Performed by: FAMILY MEDICINE

## 2022-03-11 RX ORDER — SPIRONOLACTONE 25 MG/1
25 TABLET ORAL DAILY
Qty: 90 TABLET | Refills: 4 | Status: SHIPPED | OUTPATIENT
Start: 2022-03-11 | End: 2023-10-25

## 2022-03-11 RX ORDER — MELOXICAM 15 MG/1
15 TABLET ORAL DAILY
Qty: 90 TABLET | Refills: 3 | Status: SHIPPED | OUTPATIENT
Start: 2022-03-11 | End: 2023-02-18

## 2022-03-11 RX ORDER — ALLOPURINOL 300 MG/1
1 TABLET ORAL DAILY
Qty: 90 TABLET | Refills: 4 | Status: SHIPPED | OUTPATIENT
Start: 2022-03-11 | End: 2023-04-24

## 2022-03-11 RX ORDER — AMLODIPINE BESYLATE 5 MG/1
5 TABLET ORAL DAILY
Qty: 90 TABLET | Refills: 4 | Status: SHIPPED | OUTPATIENT
Start: 2022-03-11 | End: 2023-04-24

## 2022-03-11 ASSESSMENT — PAIN SCALES - GENERAL: PAINLEVEL: SEVERE PAIN (6)

## 2022-03-11 NOTE — NURSING NOTE
Prior to immunization administration, verified patients identity using patient s name and date of birth. Please see Immunization Activity for additional information.     Screening Questionnaire for Adult Immunization    Are you sick today?   No   Do you have allergies to medications, food, a vaccine component or latex?   No   Have you ever had a serious reaction after receiving a vaccination?   No   Do you have a long-term health problem with heart, lung, kidney, or metabolic disease (e.g., diabetes), asthma, a blood disorder, no spleen, complement component deficiency, a cochlear implant, or a spinal fluid leak?  Are you on long-term aspirin therapy?   No   Do you have cancer, leukemia, HIV/AIDS, or any other immune system problem?   No   Do you have a parent, brother, or sister with an immune system problem?   No   In the past 3 months, have you taken medications that affect  your immune system, such as prednisone, other steroids, or anticancer drugs; drugs for the treatment of rheumatoid arthritis, Crohn s disease, or psoriasis; or have you had radiation treatments?   No   Have you had a seizure, or a brain or other nervous system problem?   No   During the past year, have you received a transfusion of blood or blood    products, or been given immune (gamma) globulin or antiviral drug?   No   For women: Are you pregnant or is there a chance you could become       pregnant during the next month?   No   Have you received any vaccinations in the past 4 weeks?   No     Immunization questionnaire answers were all negative.        Per orders of Dr. Boudreaux, injection of twinrix given by Dolores Medina CMA. Patient instructed to remain in clinic for 15 minutes afterwards, and to report any adverse reaction to me immediately.       Screening performed by Dolores Medina CMA on 3/11/2022 at 1:19 PM.

## 2022-03-11 NOTE — PROGRESS NOTES
Assessment & Plan     ASSESSMENT/ORDERS:    ICD-10-CM    1. Benign essential hypertension  I10 amLODIPine (NORVASC) 5 MG tablet     spironolactone (ALDACTONE) 25 MG tablet     Comprehensive metabolic panel (BMP + Alb, Alk Phos, ALT, AST, Total. Bili, TP)   2. Primary osteoarthritis, unspecified site  M19.91 meloxicam (MOBIC) 15 MG tablet     Comprehensive metabolic panel (BMP + Alb, Alk Phos, ALT, AST, Total. Bili, TP)   3. Idiopathic chronic gout of multiple sites without tophus  M1A.09X0 allopurinol (ZYLOPRIM) 300 MG tablet     Comprehensive metabolic panel (BMP + Alb, Alk Phos, ALT, AST, Total. Bili, TP)     Uric acid   4. Morbid obesity, unspecified obesity type (H)  E66.01    5. Type 2 diabetes mellitus with diabetic nephropathy, with long-term current use of insulin (H)  E11.21     Z79.4    6. Persistent proteinuria  R80.1    7. Stage 2 chronic kidney disease  N18.2    8. High priority for 2019-nCoV vaccine  Z23 COVID-19,PF,MODERNA (18+ Yrs BOOSTER .25mL)     PLAN:  1.  Offered to have him see ortho to workup his bilateral wrist osteoarthritis and grasp/flexor tendon issue.  He declined for now, but will contact me if he changes his mind.  Continue meloxicam, monitor kidney function with his proteinuria history and chronic kidney disease stage 2.  2.  He will continue to see his endocrinologist for diabetes related care.  3.  Hypertension stable, refilled spironolactone.  CMP ordered.  4.  discussed importance of weight loss for his diabetes and hip/knee osteoarthritis pain.  5.  Gout medication and labs refilled/ordered.                 Return in about 6 months (around 9/11/2022) for hypertension recheck.    Scottie Boudreaux MD  [facility] TANIA Motta is a 57 year old who presents for the following health issues     History of Present Illness       Reason for visit:  Check up    He eats 2-3 servings of fruits and vegetables daily.He consumes 0 sweetened beverage(s)  "daily.He exercises with enough effort to increase his heart rate 30 to 60 minutes per day.  He exercises with enough effort to increase his heart rate 3 or less days per week. He is missing 2 dose(s) of medications per week.  He is not taking prescribed medications regularly due to remembering to take.         Medication Followup of Spirinolactone    Taking Medication as prescribed: yes    Side Effects:  None    Medication Helping Symptoms:  \"don't know what I'm taking it for\"     Here for hypertension follow-up.  Stable on spironolactone.  No concerns.    Continues to have hand/wirst pain.  Is a .  Has 3 years of work left until intermediate.  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  He may do so after intermediate.    Sees endocrinology for his diabetes management.  Hemoglobin A1c at goal.  Working on weight loss.    Has life history of proteinuria.  Chronic kidney disease stage 2.  Takes meloxicam for osteoarthritis.    Review of Systems         Objective    /76   Pulse 76   Temp 98.1  F (36.7  C) (Temporal)   Resp 16   Wt 134.5 kg (296 lb 9.6 oz)   SpO2 96%   BMI 42.56 kg/m    Body mass index is 42.56 kg/m .  Physical Exam  Constitutional:       Appearance: He is well-developed. He is morbidly obese.   Cardiovascular:      Rate and Rhythm: Normal rate and regular rhythm.      Heart sounds: Normal heart sounds, S1 normal and S2 normal. No murmur heard.  Pulmonary:      Effort: Pulmonary effort is normal. No respiratory distress.      Breath sounds: Normal breath sounds. No wheezing, rhonchi or rales.   Neurological:      Mental Status: He is alert.                        "

## 2022-03-16 ENCOUNTER — LAB (OUTPATIENT)
Dept: LAB | Facility: CLINIC | Age: 57
End: 2022-03-16
Payer: COMMERCIAL

## 2022-03-16 ENCOUNTER — ANCILLARY PROCEDURE (OUTPATIENT)
Dept: ULTRASOUND IMAGING | Facility: CLINIC | Age: 57
End: 2022-03-16
Attending: INTERNAL MEDICINE
Payer: COMMERCIAL

## 2022-03-16 DIAGNOSIS — Z79.4 TYPE 2 DIABETES MELLITUS WITH DIABETIC AUTONOMIC NEUROPATHY, WITH LONG-TERM CURRENT USE OF INSULIN (H): Primary | ICD-10-CM

## 2022-03-16 DIAGNOSIS — M1A.09X0 IDIOPATHIC CHRONIC GOUT OF MULTIPLE SITES WITHOUT TOPHUS: ICD-10-CM

## 2022-03-16 DIAGNOSIS — E11.43 TYPE 2 DIABETES MELLITUS WITH DIABETIC AUTONOMIC NEUROPATHY, WITH LONG-TERM CURRENT USE OF INSULIN (H): Primary | ICD-10-CM

## 2022-03-16 DIAGNOSIS — E04.2 MULTIPLE THYROID NODULES: ICD-10-CM

## 2022-03-16 DIAGNOSIS — M19.91 PRIMARY OSTEOARTHRITIS, UNSPECIFIED SITE: ICD-10-CM

## 2022-03-16 DIAGNOSIS — I10 BENIGN ESSENTIAL HYPERTENSION: ICD-10-CM

## 2022-03-16 LAB
ALBUMIN SERPL-MCNC: 3.8 G/DL (ref 3.4–5)
ALP SERPL-CCNC: 132 U/L (ref 40–150)
ALT SERPL W P-5'-P-CCNC: 39 U/L (ref 0–70)
ANION GAP SERPL CALCULATED.3IONS-SCNC: 5 MMOL/L (ref 3–14)
AST SERPL W P-5'-P-CCNC: 25 U/L (ref 0–45)
BILIRUB SERPL-MCNC: 0.3 MG/DL (ref 0.2–1.3)
BUN SERPL-MCNC: 39 MG/DL (ref 7–30)
CALCIUM SERPL-MCNC: 9.6 MG/DL (ref 8.5–10.1)
CHLORIDE BLD-SCNC: 105 MMOL/L (ref 94–109)
CHOLEST SERPL-MCNC: 199 MG/DL
CO2 SERPL-SCNC: 27 MMOL/L (ref 20–32)
CREAT SERPL-MCNC: 1.19 MG/DL (ref 0.66–1.25)
CREAT UR-MCNC: 112 MG/DL
FASTING STATUS PATIENT QL REPORTED: YES
GFR SERPL CREATININE-BSD FRML MDRD: 70 ML/MIN/1.73M2
GLUCOSE BLD-MCNC: 148 MG/DL (ref 70–99)
HBA1C MFR BLD: 7.9 % (ref 0–5.6)
HCT VFR BLD AUTO: 45.3 % (ref 40–53)
HDLC SERPL-MCNC: 31 MG/DL
LDLC SERPL CALC-MCNC: 110 MG/DL
MICROALBUMIN UR-MCNC: 245 MG/L
MICROALBUMIN/CREAT UR: 218.75 MG/G CR (ref 0–17)
NONHDLC SERPL-MCNC: 168 MG/DL
POTASSIUM BLD-SCNC: 4.4 MMOL/L (ref 3.4–5.3)
PROT SERPL-MCNC: 8.2 G/DL (ref 6.8–8.8)
SODIUM SERPL-SCNC: 137 MMOL/L (ref 133–144)
TRIGL SERPL-MCNC: 288 MG/DL
TSH SERPL DL<=0.005 MIU/L-ACNC: 0.86 MU/L (ref 0.4–4)
URATE SERPL-MCNC: 5.7 MG/DL (ref 3.5–7.2)

## 2022-03-16 PROCEDURE — 80053 COMPREHEN METABOLIC PANEL: CPT

## 2022-03-16 PROCEDURE — 84550 ASSAY OF BLOOD/URIC ACID: CPT

## 2022-03-16 PROCEDURE — 84443 ASSAY THYROID STIM HORMONE: CPT

## 2022-03-16 PROCEDURE — 36415 COLL VENOUS BLD VENIPUNCTURE: CPT

## 2022-03-16 PROCEDURE — 80061 LIPID PANEL: CPT

## 2022-03-16 PROCEDURE — 82043 UR ALBUMIN QUANTITATIVE: CPT

## 2022-03-16 PROCEDURE — 83036 HEMOGLOBIN GLYCOSYLATED A1C: CPT

## 2022-03-16 PROCEDURE — 85014 HEMATOCRIT: CPT

## 2022-03-16 PROCEDURE — 76536 US EXAM OF HEAD AND NECK: CPT

## 2022-03-16 RX ORDER — ATORVASTATIN CALCIUM 80 MG/1
80 TABLET, FILM COATED ORAL DAILY
Qty: 90 TABLET | Refills: 3 | Status: SHIPPED | OUTPATIENT
Start: 2022-03-16 | End: 2023-02-20

## 2022-03-17 NOTE — RESULT ENCOUNTER NOTE
The A1c is 7.9.  The amount of proteins in the urine has remained stable.  The cholesterol remains suboptimally controlled.  I recommend to increase the dose of atorvastatin to 80 mg daily.  I did change the prescription.  The other lab results are normal.

## 2022-04-07 ENCOUNTER — TELEPHONE (OUTPATIENT)
Dept: ENDOCRINOLOGY | Facility: CLINIC | Age: 57
End: 2022-04-07
Payer: COMMERCIAL

## 2022-04-07 NOTE — TELEPHONE ENCOUNTER
Dr. Ferreira received notice that patient has not viewed her message sent to patient regarding his labs and thyroid ultrasound.     Result message were as follows:    -The thyroid nodules have remained stable.    -The A1c is 7.9.  The amount of proteins in the urine has remained stable.  The cholesterol remains suboptimally controlled.  I recommend to increase the dose of atorvastatin to 80 mg daily.  I did change the prescription.  The other lab results are normal.          Writer left voicemail for patient to contact clinic.     Selma Stevenson RN  Endocrine Care Coordinator  [facility]

## 2022-05-08 DIAGNOSIS — I10 ESSENTIAL HYPERTENSION WITH GOAL BLOOD PRESSURE LESS THAN 140/90: ICD-10-CM

## 2022-05-10 RX ORDER — CHLORTHALIDONE 25 MG/1
25 TABLET ORAL DAILY
Qty: 30 TABLET | Refills: 0 | Status: SHIPPED | OUTPATIENT
Start: 2022-05-10 | End: 2022-06-15

## 2022-05-13 NOTE — TELEPHONE ENCOUNTER
Uncertain if patient viewed message from Dr. Ferreira in [employer]. Writer again attempted to contact patient. No answer. Left voicemail for patient to contact our office.       Selma Stevenson RN  Endocrine Care Coordinator  [facility]

## 2022-05-16 NOTE — TELEPHONE ENCOUNTER
[email or web address] message sent to patient.       Selma Stevenson, RN  Endocrine Care Coordinator  [facility]

## 2022-05-19 DIAGNOSIS — Z79.4 TYPE 2 DIABETES MELLITUS WITH DIABETIC NEPHROPATHY, WITH LONG-TERM CURRENT USE OF INSULIN (H): ICD-10-CM

## 2022-05-19 DIAGNOSIS — E11.21 TYPE 2 DIABETES MELLITUS WITH DIABETIC NEPHROPATHY, WITH LONG-TERM CURRENT USE OF INSULIN (H): ICD-10-CM

## 2022-05-20 RX ORDER — INSULIN DEGLUDEC 200 U/ML
INJECTION, SOLUTION SUBCUTANEOUS
Qty: 45 ML | Refills: 2 | Status: SHIPPED | OUTPATIENT
Start: 2022-05-20 | End: 2023-10-25

## 2022-05-20 NOTE — TELEPHONE ENCOUNTER
TRESIBA FLEXTOUCH PEN 3ML 3'S 200U/ML      Last Written Prescription Date:  6-9-21  Last Fill Quantity: 45 ml,   # refills: 2  Last Office Visit : 9-22-21  Future Office visit:  5-25-22    Routing refill request to provider for review/approval because:  Insulin - refilled per clinic

## 2022-05-24 NOTE — PROGRESS NOTES
Outcome for 05/25/22 10:17 AM: Glucose Readings sent via [employer] and printed for provider to review.  Outcome for 05/24/22 9:40 AM: [employer] message sent requesting blood sugar readings from meter.  Marly Bravo [facility]  Adult Endocrinology  [facility] [place]

## 2022-05-25 ENCOUNTER — OFFICE VISIT (OUTPATIENT)
Dept: ENDOCRINOLOGY | Facility: CLINIC | Age: 57
End: 2022-05-25
Payer: COMMERCIAL

## 2022-05-25 VITALS
SYSTOLIC BLOOD PRESSURE: 132 MMHG | OXYGEN SATURATION: 97 % | WEIGHT: 294 LBS | BODY MASS INDEX: 42.18 KG/M2 | DIASTOLIC BLOOD PRESSURE: 65 MMHG | HEART RATE: 48 BPM

## 2022-05-25 DIAGNOSIS — E66.01 MORBID OBESITY, UNSPECIFIED OBESITY TYPE (H): ICD-10-CM

## 2022-05-25 DIAGNOSIS — E11.21 TYPE 2 DIABETES MELLITUS WITH DIABETIC NEPHROPATHY, WITH LONG-TERM CURRENT USE OF INSULIN (H): Primary | ICD-10-CM

## 2022-05-25 DIAGNOSIS — E26.9 HYPERALDOSTERONISM (H): ICD-10-CM

## 2022-05-25 DIAGNOSIS — I49.9 IRREGULAR CARDIAC RHYTHM: ICD-10-CM

## 2022-05-25 DIAGNOSIS — E04.2 MULTIPLE THYROID NODULES: ICD-10-CM

## 2022-05-25 DIAGNOSIS — I10 ESSENTIAL HYPERTENSION: ICD-10-CM

## 2022-05-25 DIAGNOSIS — Z79.4 TYPE 2 DIABETES MELLITUS WITH DIABETIC NEPHROPATHY, WITH LONG-TERM CURRENT USE OF INSULIN (H): Primary | ICD-10-CM

## 2022-05-25 PROCEDURE — 93000 ELECTROCARDIOGRAM COMPLETE: CPT | Performed by: INTERNAL MEDICINE

## 2022-05-25 PROCEDURE — 99215 OFFICE O/P EST HI 40 MIN: CPT | Performed by: INTERNAL MEDICINE

## 2022-05-25 RX ORDER — PEN NEEDLE, DIABETIC 31 GX5/16"
NEEDLE, DISPOSABLE MISCELLANEOUS
Qty: 200 EACH | Refills: 3 | Status: CANCELLED | OUTPATIENT
Start: 2022-05-25

## 2022-05-25 RX ORDER — PROCHLORPERAZINE 25 MG/1
SUPPOSITORY RECTAL
Qty: 3 EACH | Refills: 11 | Status: SHIPPED | OUTPATIENT
Start: 2022-05-25 | End: 2023-06-01

## 2022-05-25 RX ORDER — LISINOPRIL 40 MG/1
40 TABLET ORAL AT BEDTIME
Qty: 90 TABLET | Refills: 4 | Status: SHIPPED | OUTPATIENT
Start: 2022-05-25 | End: 2023-08-02

## 2022-05-25 RX ORDER — INSULIN ASPART 100 [IU]/ML
24 INJECTION, SOLUTION INTRAVENOUS; SUBCUTANEOUS DAILY
Qty: 27 ML | Refills: 3 | Status: SHIPPED | OUTPATIENT
Start: 2022-05-25 | End: 2023-04-07

## 2022-05-25 RX ORDER — PROCHLORPERAZINE 25 MG/1
SUPPOSITORY RECTAL
Qty: 1 EACH | Refills: 0 | Status: SHIPPED | OUTPATIENT
Start: 2022-05-25 | End: 2023-04-27

## 2022-05-25 RX ORDER — PEN NEEDLE, DIABETIC 31 GX5/16"
NEEDLE, DISPOSABLE MISCELLANEOUS
Qty: 200 EACH | Refills: 3 | Status: SHIPPED | OUTPATIENT
Start: 2022-05-25 | End: 2023-06-24

## 2022-05-25 RX ORDER — PROCHLORPERAZINE 25 MG/1
SUPPOSITORY RECTAL
Qty: 1 EACH | Refills: 3 | Status: SHIPPED | OUTPATIENT
Start: 2022-05-25 | End: 2023-06-01

## 2022-05-25 NOTE — PATIENT INSTRUCTIONS
Check BG at bedtime instead of lunchtime   If the bedtime BG is elevated, you might need more Novolog for dinner   If the bedtime BG is normal and the morning BG is elevated, you might need a higher dose of Tresiba

## 2022-05-25 NOTE — NURSING NOTE
Kalia Boateng's goals for this visit include:   Chief Complaint   Patient presents with     Diabetes     He requests these members of his care team be copied on today's visit information: Yes    PCP: Scottie Boudreaux    Referring Provider:  Scottie Boudreaux MD  [address] [place] [address]    /65 (BP Location: Left arm, Patient Position: Sitting, Cuff Size: Adult Large)   Pulse (!) 48   Wt 133.4 kg (294 lb)   SpO2 97%   BMI 42.18 kg/m      Do you need any medication refills at today's visit? Yes
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
## 2022-05-26 LAB
ATRIAL RATE - MUSE: 57 BPM
DIASTOLIC BLOOD PRESSURE - MUSE: NORMAL MMHG
INTERPRETATION ECG - MUSE: NORMAL
P AXIS - MUSE: 64 DEGREES
PR INTERVAL - MUSE: 166 MS
QRS DURATION - MUSE: 94 MS
QT - MUSE: 458 MS
QTC - MUSE: 445 MS
R AXIS - MUSE: -7 DEGREES
SYSTOLIC BLOOD PRESSURE - MUSE: NORMAL MMHG
T AXIS - MUSE: 18 DEGREES
VENTRICULAR RATE- MUSE: 57 BPM

## 2022-06-12 DIAGNOSIS — I10 ESSENTIAL HYPERTENSION WITH GOAL BLOOD PRESSURE LESS THAN 140/90: ICD-10-CM

## 2022-06-15 RX ORDER — CHLORTHALIDONE 25 MG/1
25 TABLET ORAL DAILY
Qty: 90 TABLET | Refills: 3 | Status: SHIPPED | OUTPATIENT
Start: 2022-06-15 | End: 2023-05-10

## 2022-06-15 NOTE — TELEPHONE ENCOUNTER
Last Clinic Visit: Endocrinology, Diabetes, and Metabolism  5/25/2022  Formerly [facility] 11/23/22

## 2022-10-09 ENCOUNTER — HEALTH MAINTENANCE LETTER (OUTPATIENT)
Age: 57
End: 2022-10-09

## 2022-11-15 NOTE — ADDENDUM NOTE
Addended by: YVONNE OSBORN on: 4/28/2017 09:44 AM     Modules accepted: Orders     Mother called and left voice mail for the office requesting a call back from provider as soon as possible  She stated that patient was hospitalized on 11/14/22 as a result of a psychotic episode  She did not include more information other than needing to speak with provider immediatly  Please review  Called mother and informed message was received

## 2022-11-17 DIAGNOSIS — I10 BENIGN ESSENTIAL HYPERTENSION: ICD-10-CM

## 2022-11-17 DIAGNOSIS — E11.21 TYPE 2 DIABETES MELLITUS WITH DIABETIC NEPHROPATHY, WITH LONG-TERM CURRENT USE OF INSULIN (H): ICD-10-CM

## 2022-11-17 DIAGNOSIS — Z79.4 TYPE 2 DIABETES MELLITUS WITH DIABETIC NEPHROPATHY, WITH LONG-TERM CURRENT USE OF INSULIN (H): ICD-10-CM

## 2022-11-18 RX ORDER — SPIRONOLACTONE 25 MG/1
TABLET ORAL
Qty: 30 TABLET | Refills: 11 | OUTPATIENT
Start: 2022-11-18

## 2022-11-18 NOTE — TELEPHONE ENCOUNTER
Refill denied. Has refills on filed. Filled on 3/11/22 for 90 tabs and 4 refills.     Cornelio Mcfarlane, RN, BSN

## 2022-11-22 RX ORDER — EMPAGLIFLOZIN 25 MG/1
TABLET, FILM COATED ORAL
Qty: 90 TABLET | Refills: 3 | OUTPATIENT
Start: 2022-11-22

## 2022-11-22 NOTE — TELEPHONE ENCOUNTER
JARDIANCE TABS 25MG  empagliflozin (JARDIANCE) 25 MG TABS tablet 90 tablet 3 5/25/2022  No   Sig - Route: Take 1 tablet (25 mg) by mouth daily - Oral   Sent to pharmacy as: Empagliflozin 25 MG Oral Tablet (Jardiance)   Class: E-Prescribe   Order: [id number]   E-Prescribing Status: Receipt confirmed by pharmacy (5/25/2022  3:32 PM CDT)     Printout Tracking    External Result Report     Pharmacy    [facility] - [facility], [place] - [address]

## 2022-11-23 ENCOUNTER — OFFICE VISIT (OUTPATIENT)
Dept: ENDOCRINOLOGY | Facility: CLINIC | Age: 57
End: 2022-11-23
Payer: COMMERCIAL

## 2022-11-23 ENCOUNTER — LAB (OUTPATIENT)
Dept: LAB | Facility: CLINIC | Age: 57
End: 2022-11-23
Payer: COMMERCIAL

## 2022-11-23 VITALS
WEIGHT: 292 LBS | HEART RATE: 76 BPM | DIASTOLIC BLOOD PRESSURE: 84 MMHG | SYSTOLIC BLOOD PRESSURE: 135 MMHG | OXYGEN SATURATION: 95 % | BODY MASS INDEX: 41.9 KG/M2

## 2022-11-23 DIAGNOSIS — E26.9 HYPERALDOSTERONISM (H): ICD-10-CM

## 2022-11-23 DIAGNOSIS — I10 ESSENTIAL HYPERTENSION: ICD-10-CM

## 2022-11-23 DIAGNOSIS — E04.2 MULTIPLE THYROID NODULES: ICD-10-CM

## 2022-11-23 DIAGNOSIS — E11.21 TYPE 2 DIABETES MELLITUS WITH DIABETIC NEPHROPATHY, WITH LONG-TERM CURRENT USE OF INSULIN (H): Primary | ICD-10-CM

## 2022-11-23 DIAGNOSIS — E11.21 TYPE 2 DIABETES MELLITUS WITH DIABETIC NEPHROPATHY, WITH LONG-TERM CURRENT USE OF INSULIN (H): ICD-10-CM

## 2022-11-23 DIAGNOSIS — Z79.4 TYPE 2 DIABETES MELLITUS WITH DIABETIC NEPHROPATHY, WITH LONG-TERM CURRENT USE OF INSULIN (H): Primary | ICD-10-CM

## 2022-11-23 DIAGNOSIS — E66.01 MORBID OBESITY, UNSPECIFIED OBESITY TYPE (H): ICD-10-CM

## 2022-11-23 DIAGNOSIS — Z79.4 TYPE 2 DIABETES MELLITUS WITH DIABETIC NEPHROPATHY, WITH LONG-TERM CURRENT USE OF INSULIN (H): ICD-10-CM

## 2022-11-23 LAB
CHOLEST SERPL-MCNC: 185 MG/DL
FASTING STATUS PATIENT QL REPORTED: YES
HBA1C MFR BLD: 7.1 % (ref 0–5.6)
HDLC SERPL-MCNC: 30 MG/DL
LDLC SERPL CALC-MCNC: 114 MG/DL
NONHDLC SERPL-MCNC: 155 MG/DL
TRIGL SERPL-MCNC: 207 MG/DL

## 2022-11-23 PROCEDURE — 99214 OFFICE O/P EST MOD 30 MIN: CPT | Performed by: INTERNAL MEDICINE

## 2022-11-23 PROCEDURE — 95251 CONT GLUC MNTR ANALYSIS I&R: CPT | Performed by: INTERNAL MEDICINE

## 2022-11-23 PROCEDURE — 80061 LIPID PANEL: CPT

## 2022-11-23 PROCEDURE — 36415 COLL VENOUS BLD VENIPUNCTURE: CPT

## 2022-11-23 PROCEDURE — 83036 HEMOGLOBIN GLYCOSYLATED A1C: CPT
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
## 2022-11-23 NOTE — NURSING NOTE
Kalia Boateng's goals for this visit include:   Chief Complaint   Patient presents with     RECHECK     Follow up diabetes     He requests these members of his care team be copied on today's visit information: yes    PCP: Scottie Boudreaux    Referring Provider:  Scottie Boudreaux MD  [address] [place] [address]    BP (!) 146/86 (BP Location: Left arm, Patient Position: Sitting, Cuff Size: Adult Regular)   Pulse 58   Wt 132.5 kg (292 lb)   SpO2 95%   BMI 41.90 kg/m      Do you need any medication refills at today's visit? no    Ye De La Cruz [facility]  Adult Endocrinology  [facility]

## 2022-11-23 NOTE — PATIENT INSTRUCTIONS
Decrease the dose of Tresiba to 52 units   For meals like oatmeal or cream of wheat, administer 10 units Novolog prior     '[facility]-Department of Endocrinology  Diabetes Educators:   Madison Douglas, RN and Radha Wei RN  Clinic Nurse: Selma RN  CMA's: Eduardo   LPN: Annette  Scheduling/Clinic phone number : [phone]   Clinic Fax: [phone]  On-Call Endocrine at the [place] (after hours/weekends): [phone] option 4    Please call the number below to schedule your labs.    Lab    General [phone]   [facility] [phone]   [place] [phone]   [facility]  [phone]   [facility] [phone]   [place] [phone]   [facility]) [phone]   [facility]   [place] [phone]   [place] [phone]   [place] [phone]   [place] [phone]     Please reach out to the following centers to schedule your imaging appointment:       Imaging (DEXA, CT, MRI, XRAY)    [facility] ([facility], [facility], [place]) [phone]   [facility] ([place], [place]) [phone]   [facility] ([facility]) [phone]   [facility] ([facility]) [phone]     Appointment Reminders:  * Please bring meter with for staff to download  * If you are due ONLY for an A1C, it is scheduled with the nurse and will be done in clinic. You do not need to schedule a lab appointment. Fasting is not required for an A1C.  * Refill request should be submitted to your pharmacy. They will contact clinic for approval.

## 2023-02-17 DIAGNOSIS — Z79.4 TYPE 2 DIABETES MELLITUS WITH DIABETIC AUTONOMIC NEUROPATHY, WITH LONG-TERM CURRENT USE OF INSULIN (H): ICD-10-CM

## 2023-02-17 DIAGNOSIS — M19.91 PRIMARY OSTEOARTHRITIS, UNSPECIFIED SITE: ICD-10-CM

## 2023-02-17 DIAGNOSIS — E11.43 TYPE 2 DIABETES MELLITUS WITH DIABETIC AUTONOMIC NEUROPATHY, WITH LONG-TERM CURRENT USE OF INSULIN (H): ICD-10-CM

## 2023-02-17 NOTE — TELEPHONE ENCOUNTER
"Requested Prescriptions   Pending Prescriptions Disp Refills    meloxicam (MOBIC) 15 MG tablet [Pharmacy Med Name: MELOXICAM TABS 15MG] 90 tablet 3     Sig: TAKE ONE-HALF (1/2) TO ONE TABLET DAILY (NEED OFFICE VISIT BEFORE FURTHER REFILLS)       NSAID Medications Failed - 2/17/2023 12:12 AM        Failed - Normal CBC on file in past 12 months     Recent Labs   Lab Test 03/16/22  0741 03/15/21  1223   WBC  --  7.1   RBC  --  4.68   HGB  --  14.2   HCT 45.3 43.0   PLT  --  167                 Passed - Blood pressure under 140/90 in past 12 months     BP Readings from Last 3 Encounters:   11/23/22 135/84   05/25/22 132/65   03/11/22 138/76                 Passed - Normal ALT on file in past 12 months     Recent Labs   Lab Test 03/16/22  0741   ALT 39             Passed - Normal AST on file in past 12 months     Recent Labs   Lab Test 03/16/22  0741   AST 25             Passed - Recent (12 mo) or future (30 days) visit within the authorizing provider's specialty     Patient has had an office visit with the authorizing provider or a provider within the authorizing providers department within the previous 12 mos or has a future within next 30 days. See \"Patient Info\" tab in inbasket, or \"Choose Columns\" in Meds & Orders section of the refill encounter.              Passed - Patient is age 6-64 years        Passed - Medication is active on med list        Passed - Normal serum creatinine on file in past 12 months     Recent Labs   Lab Test 03/16/22  0741   CR 1.19       Ok to refill medication if creatinine is low               BERNARDA MonteroN, RN     "

## 2023-02-18 RX ORDER — MELOXICAM 15 MG/1
TABLET ORAL
Qty: 90 TABLET | Refills: 0 | Status: SHIPPED | OUTPATIENT
Start: 2023-02-18 | End: 2023-04-24

## 2023-02-18 NOTE — TELEPHONE ENCOUNTER
Refill approved. Due for office visit before any further refills.    Serenity Brooks PA-C  Covering for Scottie Boudreaux

## 2023-02-20 ENCOUNTER — MYC MEDICAL ADVICE (OUTPATIENT)
Dept: FAMILY MEDICINE | Facility: CLINIC | Age: 58
End: 2023-02-20
Payer: COMMERCIAL

## 2023-02-20 RX ORDER — ATORVASTATIN CALCIUM 80 MG/1
80 TABLET, FILM COATED ORAL DAILY
Qty: 90 TABLET | Refills: 2 | Status: SHIPPED | OUTPATIENT
Start: 2023-02-20 | End: 2023-10-25

## 2023-02-20 NOTE — LETTER
[facility] [address] [address]  Phone: [phone]  Fax: [phone]           February 20, 2023        Kalia Boateng                                                                                                                                [address] [place] [id number]                 Dear Mr. Boateng,     We are concerned about your health care.  We recently provided you with a medication refill.  Many medications require routine follow-up with your Doctor.       At this time we ask that: You schedule a routine office visit with your physician to follow your Meloxicam (Mobic)     Your prescription: Has been refilled so you may have time for the above noted follow-up.     Thank you   [facility] Care Team  [phone]

## 2023-02-20 NOTE — TELEPHONE ENCOUNTER
ATORVASTATIN TABS 80MG        Office Visit  11/23/2022  [facility]   Gin Ferreira MD  Endocrinology, Diabetes, and Metabolism

## 2023-03-13 NOTE — TELEPHONE ENCOUNTER
Will have staff contact patient and find out what his home blood pressure measurements have been or if he needs a float nurse visit to have them checked.    Please send me his results so I know if we need to make blood pressure medication adjustments.    Scottie Boudreaux MD    Yes

## 2023-03-25 ENCOUNTER — HEALTH MAINTENANCE LETTER (OUTPATIENT)
Age: 58
End: 2023-03-25

## 2023-04-05 ENCOUNTER — MYC MEDICAL ADVICE (OUTPATIENT)
Dept: ENDOCRINOLOGY | Facility: CLINIC | Age: 58
End: 2023-04-05
Payer: COMMERCIAL

## 2023-04-05 DIAGNOSIS — Z79.4 TYPE 2 DIABETES MELLITUS WITH DIABETIC NEPHROPATHY, WITH LONG-TERM CURRENT USE OF INSULIN (H): ICD-10-CM

## 2023-04-05 DIAGNOSIS — E11.21 TYPE 2 DIABETES MELLITUS WITH DIABETIC NEPHROPATHY, WITH LONG-TERM CURRENT USE OF INSULIN (H): ICD-10-CM

## 2023-04-10 RX ORDER — INSULIN ASPART 100 [IU]/ML
INJECTION, SOLUTION INTRAVENOUS; SUBCUTANEOUS
Qty: 30 ML | Refills: 0 | Status: SHIPPED | OUTPATIENT
Start: 2023-04-10 | End: 2023-09-15

## 2023-04-10 NOTE — TELEPHONE ENCOUNTER
Short Acting Insulin Protocol Failed 04/10/2023 11:09 AM   Protocol Details  Serum creatinine on file in past 12 months     Pt notified lab draw needed

## 2023-04-11 ENCOUNTER — LAB (OUTPATIENT)
Dept: LAB | Facility: CLINIC | Age: 58
End: 2023-04-11
Payer: COMMERCIAL

## 2023-04-11 DIAGNOSIS — E11.21 TYPE 2 DIABETES MELLITUS WITH DIABETIC NEPHROPATHY, WITH LONG-TERM CURRENT USE OF INSULIN (H): ICD-10-CM

## 2023-04-11 DIAGNOSIS — E04.2 MULTIPLE THYROID NODULES: ICD-10-CM

## 2023-04-11 DIAGNOSIS — Z79.4 TYPE 2 DIABETES MELLITUS WITH DIABETIC NEPHROPATHY, WITH LONG-TERM CURRENT USE OF INSULIN (H): ICD-10-CM

## 2023-04-11 LAB
ALBUMIN SERPL BCG-MCNC: 4.2 G/DL (ref 3.5–5.2)
ALP SERPL-CCNC: 150 U/L (ref 40–129)
ALT SERPL W P-5'-P-CCNC: 45 U/L (ref 10–50)
ANION GAP SERPL CALCULATED.3IONS-SCNC: 11 MMOL/L (ref 7–15)
AST SERPL W P-5'-P-CCNC: 39 U/L (ref 10–50)
BILIRUB SERPL-MCNC: 0.3 MG/DL
BUN SERPL-MCNC: 31.7 MG/DL (ref 8–23)
CALCIUM SERPL-MCNC: 9.5 MG/DL (ref 8.8–10.2)
CHLORIDE SERPL-SCNC: 101 MMOL/L (ref 98–107)
CHOLEST SERPL-MCNC: 232 MG/DL
CREAT SERPL-MCNC: 1.27 MG/DL (ref 0.67–1.17)
CREAT UR-MCNC: 83.1 MG/DL
DEPRECATED HCO3 PLAS-SCNC: 28 MMOL/L (ref 22–29)
GFR SERPL CREATININE-BSD FRML MDRD: 65 ML/MIN/1.73M2
GLUCOSE SERPL-MCNC: 121 MG/DL (ref 70–99)
HBA1C MFR BLD: 7.4 %
HCT VFR BLD AUTO: 44.6 % (ref 40–53)
HDLC SERPL-MCNC: 29 MG/DL
LDLC SERPL CALC-MCNC: 150 MG/DL
MICROALBUMIN UR-MCNC: 1112.2 MG/L
MICROALBUMIN/CREAT UR: 1338.39 MG/G CR (ref 0–17)
NONHDLC SERPL-MCNC: 203 MG/DL
POTASSIUM SERPL-SCNC: 3.6 MMOL/L (ref 3.4–5.3)
PROT SERPL-MCNC: 7.6 G/DL (ref 6.4–8.3)
SODIUM SERPL-SCNC: 140 MMOL/L (ref 136–145)
TRIGL SERPL-MCNC: 266 MG/DL
TSH SERPL DL<=0.005 MIU/L-ACNC: 0.73 UIU/ML (ref 0.3–4.2)

## 2023-04-11 PROCEDURE — 36415 COLL VENOUS BLD VENIPUNCTURE: CPT

## 2023-04-11 PROCEDURE — 84443 ASSAY THYROID STIM HORMONE: CPT

## 2023-04-11 PROCEDURE — 80053 COMPREHEN METABOLIC PANEL: CPT

## 2023-04-11 PROCEDURE — 83036 HEMOGLOBIN GLYCOSYLATED A1C: CPT

## 2023-04-11 PROCEDURE — 85014 HEMATOCRIT: CPT

## 2023-04-11 PROCEDURE — 80061 LIPID PANEL: CPT

## 2023-04-11 PROCEDURE — 82043 UR ALBUMIN QUANTITATIVE: CPT

## 2023-04-11 PROCEDURE — 82570 ASSAY OF URINE CREATININE: CPT

## 2023-04-24 ENCOUNTER — OFFICE VISIT (OUTPATIENT)
Dept: FAMILY MEDICINE | Facility: CLINIC | Age: 58
End: 2023-04-24
Payer: COMMERCIAL

## 2023-04-24 VITALS
OXYGEN SATURATION: 97 % | WEIGHT: 295 LBS | TEMPERATURE: 98.3 F | RESPIRATION RATE: 20 BRPM | SYSTOLIC BLOOD PRESSURE: 150 MMHG | DIASTOLIC BLOOD PRESSURE: 84 MMHG | BODY MASS INDEX: 42.23 KG/M2 | HEART RATE: 70 BPM | HEIGHT: 70 IN

## 2023-04-24 DIAGNOSIS — M19.91 PRIMARY OSTEOARTHRITIS, UNSPECIFIED SITE: ICD-10-CM

## 2023-04-24 DIAGNOSIS — M1A.09X0 IDIOPATHIC CHRONIC GOUT OF MULTIPLE SITES WITHOUT TOPHUS: ICD-10-CM

## 2023-04-24 DIAGNOSIS — Z79.4 TYPE 2 DIABETES MELLITUS WITH DIABETIC NEPHROPATHY, WITH LONG-TERM CURRENT USE OF INSULIN (H): ICD-10-CM

## 2023-04-24 DIAGNOSIS — Z79.4 TYPE 2 DIABETES MELLITUS WITH DIABETIC NEPHROPATHY, WITH LONG-TERM CURRENT USE OF INSULIN (H): Primary | ICD-10-CM

## 2023-04-24 DIAGNOSIS — E66.01 MORBID OBESITY, UNSPECIFIED OBESITY TYPE (H): ICD-10-CM

## 2023-04-24 DIAGNOSIS — B37.2 CANDIDAL SKIN INFECTION: ICD-10-CM

## 2023-04-24 DIAGNOSIS — E26.9 HYPERALDOSTERONISM (H): ICD-10-CM

## 2023-04-24 DIAGNOSIS — E11.21 TYPE 2 DIABETES MELLITUS WITH DIABETIC NEPHROPATHY, WITH LONG-TERM CURRENT USE OF INSULIN (H): ICD-10-CM

## 2023-04-24 DIAGNOSIS — E11.21 TYPE 2 DIABETES MELLITUS WITH DIABETIC NEPHROPATHY, WITH LONG-TERM CURRENT USE OF INSULIN (H): Primary | ICD-10-CM

## 2023-04-24 DIAGNOSIS — Z78.9 VARICELLA VACCINATION STATUS UNKNOWN: ICD-10-CM

## 2023-04-24 DIAGNOSIS — I10 BENIGN ESSENTIAL HYPERTENSION: ICD-10-CM

## 2023-04-24 LAB
URATE SERPL-MCNC: 5.9 MG/DL (ref 3.4–7)
VZV IGG SER QL IA: 3484 INDEX
VZV IGG SER QL IA: POSITIVE

## 2023-04-24 PROCEDURE — 99207 PR FOOT EXAM NO CHARGE: CPT | Performed by: FAMILY MEDICINE

## 2023-04-24 PROCEDURE — 90471 IMMUNIZATION ADMIN: CPT | Performed by: FAMILY MEDICINE

## 2023-04-24 PROCEDURE — 84550 ASSAY OF BLOOD/URIC ACID: CPT | Performed by: FAMILY MEDICINE

## 2023-04-24 PROCEDURE — 99214 OFFICE O/P EST MOD 30 MIN: CPT | Mod: 25 | Performed by: FAMILY MEDICINE

## 2023-04-24 PROCEDURE — 90677 PCV20 VACCINE IM: CPT | Performed by: FAMILY MEDICINE

## 2023-04-24 PROCEDURE — 36415 COLL VENOUS BLD VENIPUNCTURE: CPT | Performed by: FAMILY MEDICINE

## 2023-04-24 PROCEDURE — 86787 VARICELLA-ZOSTER ANTIBODY: CPT | Performed by: FAMILY MEDICINE

## 2023-04-24 RX ORDER — ALLOPURINOL 300 MG/1
1 TABLET ORAL DAILY
Qty: 90 TABLET | Refills: 4 | Status: SHIPPED | OUTPATIENT
Start: 2023-04-24 | End: 2023-12-04

## 2023-04-24 RX ORDER — KETOCONAZOLE 20 MG/G
CREAM TOPICAL DAILY
Qty: 30 G | Refills: 1 | Status: SHIPPED | OUTPATIENT
Start: 2023-04-24 | End: 2023-05-08

## 2023-04-24 RX ORDER — MELOXICAM 15 MG/1
7.5 TABLET ORAL DAILY
Qty: 90 TABLET | Refills: 1 | Status: SHIPPED | OUTPATIENT
Start: 2023-04-24 | End: 2023-12-04

## 2023-04-24 RX ORDER — AMLODIPINE BESYLATE 10 MG/1
10 TABLET ORAL DAILY
Qty: 90 TABLET | Refills: 4 | Status: SHIPPED | OUTPATIENT
Start: 2023-04-24 | End: 2023-12-04

## 2023-04-24 NOTE — RESULT ENCOUNTER NOTE
Kalia,  Your results are normal.  Please let me know if you have any questions.    Sincerely,  Dr. Boudreaux

## 2023-04-24 NOTE — PROGRESS NOTES
"  Assessment & Plan     ASSESSMENT/ORDERS:    ICD-10-CM    1. Type 2 diabetes mellitus with diabetic nephropathy, with long-term current use of insulin (H)  E11.21 FOOT EXAM    Z79.4       2. Hyperaldosteronism (H)  E26.9       3. Morbid obesity, unspecified obesity type (H)  E66.01       4. Candidal skin infection  B37.2 ketoconazole (NIZORAL) 2 % external cream      5. Benign essential hypertension  I10 amLODIPine (NORVASC) 10 MG tablet      6. Idiopathic chronic gout of multiple sites without tophus  M1A.09X0 allopurinol (ZYLOPRIM) 300 MG tablet     Uric acid     Uric acid      7. Varicella vaccination status unknown  Z78.9 Varicella Zoster Virus Antibody IgG     Varicella Zoster Virus Antibody IgG      8. Primary osteoarthritis, unspecified site  M19.91 meloxicam (MOBIC) 15 MG tablet        PLAN:  1.  Reviewed blood pressure medications today. Blood pressure elevated at 150/84. Spironolactone recently decreased due to hyperkalemia. Will increase amlodipine to 10 mg daily.   2. Patient with cutaneous candida in skin fold under the left breast. Discussed treatment with anti-fungal cream. Also discussed dry patches on patients left outer thigh and recommended over the counter moisturizers.   3. Patient has never had chicken pox infection. Discussed that he could have been exposed and not known it. Will check titers today and if positive, vaccinate for shingles.   4. Patient recognizes that weight loss would benefit his chronic disease management and chronic pain. Encouraged ongoing diet and weight loss.   5.  Medications refilled for all other above noted stable conditions.  Labs ordered as noted above.  6.  Continue to follow with endocrine for his hyperaldosteronism and definitive diabetes plan.                   BMI:   Estimated body mass index is 42.33 kg/m  as calculated from the following:    Height as of this encounter: 1.778 m (5' 10\").    Weight as of this encounter: 133.8 kg (295 lb).   Weight management " plan: Discussed healthy diet and exercise guidelines    FUTURE APPOINTMENTS:       - Follow-up visit in 6 months    Patient was seen and examined by myself and Scottie Boudreaux MD. The note was then scribed by me.     Irma Trent, MS3  [employer]    April 24, 2023    This patient was seen and examined by myself as well as the medical student.  The medical student scribed the note and I have reviewed it, edited it appropriately, and agree with the final documentation.     Scottie Boudreaux MD   [facility] TANIA Motta is a 58 year old, presenting for the following health issues:  Recheck Medication        4/24/2023     7:45 AM   Additional Questions   Roomed by Zabrina Camacho     History of Present Illness       Reason for visit:  Medication    He eats 2-3 servings of fruits and vegetables daily.He consumes 0 sweetened beverage(s) daily.He exercises with enough effort to increase his heart rate 9 or less minutes per day.  He exercises with enough effort to increase his heart rate 3 or less days per week.   He is taking medications regularly.       Follows with endocrinology for diabetes management. Has continuous glucose monitor. Sugars have been running anywhere from 50-300s. Medications have been stable. Had lab work done a couple of weeks ago. Decreased spironolactone due to hyperkalemia.     Blood pressure elevated at 150/84 today. Discussed that likely related to decrease in spironolactone. Does not take his blood pressure at home. Does not get lightheaded or dizzy with standing up. Does have chronic lower extremity swelling that he uses wraps for. Had leg swelling prior to starting amlodipine.     Has noticed rash that comes and goes under his left breast tissue for the past 2 years. Is careful to dry the area after showering. Notices the area becomes red, irritated and itchy at times. Has tried an over the counter anti-bacterial cream that  "will improve the irritation for 2-3 days before it returns.     Also with dry patches on his right outer thigh with mild darkening. Skin is generally itchy in winter months. Has not used lotions or any creams on the area.     Patient never had chickenpox growing up. Has been exposed to children with chickenpox. Willing to check titer before getting vaccinated for shingles.     Review of Systems         Objective    BP (!) 150/84   Pulse 70   Temp 98.3  F (36.8  C) (Temporal)   Resp 20   Ht 1.778 m (5' 10\")   Wt 133.8 kg (295 lb)   SpO2 97%   BMI 42.33 kg/m    Body mass index is 42.33 kg/m .  Physical Exam  Constitutional:       Appearance: He is well-developed.   Cardiovascular:      Rate and Rhythm: Normal rate and regular rhythm.      Heart sounds: Normal heart sounds, S1 normal and S2 normal. No murmur heard.  Pulmonary:      Effort: Pulmonary effort is normal. No respiratory distress.      Breath sounds: Normal breath sounds. No wheezing, rhonchi or rales.   Musculoskeletal:      Right lower le+ Edema present.      Left lower le+ Edema present.   Skin:     Comments: Rash under left breast tissue with erythema, irritation, and white discharge consistent with yeast infection. Several dry patches of skin on outer right thigh.    Neurological:      Mental Status: He is alert.            No results found. However, due to the size of the patient record, not all encounters were searched. Please check Results Review for a complete set of results.    ----- Services Performed by a MEDICAL STUDENT in Presence of ATTENDING Physician-------            "

## 2023-04-24 NOTE — PROGRESS NOTES
Prior to immunization administration, verified patients identity using patient s name and date of birth. Please see Immunization Activity for additional information.     Screening Questionnaire for Adult Immunization    Are you sick today?   No   Do you have allergies to medications, food, a vaccine component or latex?   No   Have you ever had a serious reaction after receiving a vaccination?   No   Do you have a long-term health problem with heart, lung, kidney, or metabolic disease (e.g., diabetes), asthma, a blood disorder, no spleen, complement component deficiency, a cochlear implant, or a spinal fluid leak?  Are you on long-term aspirin therapy?   Yes   Do you have cancer, leukemia, HIV/AIDS, or any other immune system problem?   No   Do you have a parent, brother, or sister with an immune system problem?   No   In the past 3 months, have you taken medications that affect  your immune system, such as prednisone, other steroids, or anticancer drugs; drugs for the treatment of rheumatoid arthritis, Crohn s disease, or psoriasis; or have you had radiation treatments?   No   Have you had a seizure, or a brain or other nervous system problem?   No   During the past year, have you received a transfusion of blood or blood    products, or been given immune (gamma) globulin or antiviral drug?   No   For women: Are you pregnant or is there a chance you could become       pregnant during the next month?   No   Have you received any vaccinations in the past 4 weeks?   No     Immunization questionnaire was positive for at least one answer.  Notified Yes.      Injection of Pneumo 20 given by Zabrina Camacho CMA. Patient instructed to remain in clinic for 15 minutes afterwards, and to report any adverse reactions.     Screening performed by Zabrina Camacho CMA on 4/24/2023 at 9:11 AM.

## 2023-04-27 RX ORDER — PROCHLORPERAZINE 25 MG/1
SUPPOSITORY RECTAL
Qty: 1 EACH | Refills: 0 | Status: SHIPPED | OUTPATIENT
Start: 2023-04-27 | End: 2023-10-25

## 2023-05-05 NOTE — RESULT ENCOUNTER NOTE
Klaia,  Your results show you have had chicken pox sometime in your life, therefore you are at risk for shingles.  Therefore, you should be vaccinated for shingles.  Please let me know if you have any questions.    Sincerely,  Dr. Boudreaux

## 2023-05-08 DIAGNOSIS — I10 ESSENTIAL HYPERTENSION WITH GOAL BLOOD PRESSURE LESS THAN 140/90: ICD-10-CM

## 2023-05-10 RX ORDER — CHLORTHALIDONE 25 MG/1
25 TABLET ORAL DAILY
Qty: 90 TABLET | Refills: 1 | Status: SHIPPED | OUTPATIENT
Start: 2023-05-10 | End: 2023-11-08

## 2023-05-10 NOTE — TELEPHONE ENCOUNTER
CHLORTHALIDONE TABS 25MG      Last Written Prescription Date:  6/15/22  Last Fill Quantity: 90,   # refills: 3  Last Office Visit : 11/23/22  Future Office visit:  10/25/23    Routing refill request to provider for review/approval because:    ABNORMAL CREATININE    Recent Labs   Lab Test 04/11/23  1534   CR 1.27*         Blood pressure out of range     BP Readings from Last 3 Encounters:   04/24/23 (!) 150/84   11/23/22 135/84   05/25/22 132/65

## 2023-06-23 DIAGNOSIS — E11.43 TYPE 2 DIABETES MELLITUS WITH DIABETIC AUTONOMIC NEUROPATHY, WITH LONG-TERM CURRENT USE OF INSULIN (H): ICD-10-CM

## 2023-06-23 DIAGNOSIS — Z79.4 TYPE 2 DIABETES MELLITUS WITH DIABETIC AUTONOMIC NEUROPATHY, WITH LONG-TERM CURRENT USE OF INSULIN (H): ICD-10-CM

## 2023-06-24 DIAGNOSIS — Z79.4 TYPE 2 DIABETES MELLITUS WITH DIABETIC AUTONOMIC NEUROPATHY, WITH LONG-TERM CURRENT USE OF INSULIN (H): Primary | ICD-10-CM

## 2023-06-24 DIAGNOSIS — E11.43 TYPE 2 DIABETES MELLITUS WITH DIABETIC AUTONOMIC NEUROPATHY, WITH LONG-TERM CURRENT USE OF INSULIN (H): Primary | ICD-10-CM

## 2023-06-24 RX ORDER — PEN NEEDLE, DIABETIC 31 GX5/16"
NEEDLE, DISPOSABLE MISCELLANEOUS
Qty: 200 EACH | Refills: 3 | Status: SHIPPED | OUTPATIENT
Start: 2023-06-24 | End: 2024-07-11

## 2023-06-24 NOTE — TELEPHONE ENCOUNTER
Pen needle    11/23/2022  [facility]     Gin Ferreira MD  Endocrinology, Diabetes, and Metabolism

## 2023-06-27 RX ORDER — METFORMIN HCL 500 MG
TABLET, EXTENDED RELEASE 24 HR ORAL
Qty: 360 TABLET | Refills: 1 | Status: SHIPPED | OUTPATIENT
Start: 2023-06-27 | End: 2023-10-25

## 2023-08-14 DIAGNOSIS — E11.21 TYPE 2 DIABETES MELLITUS WITH DIABETIC NEPHROPATHY, WITH LONG-TERM CURRENT USE OF INSULIN (H): ICD-10-CM

## 2023-08-14 DIAGNOSIS — Z79.4 TYPE 2 DIABETES MELLITUS WITH DIABETIC NEPHROPATHY, WITH LONG-TERM CURRENT USE OF INSULIN (H): ICD-10-CM

## 2023-08-18 RX ORDER — EMPAGLIFLOZIN 25 MG/1
25 TABLET, FILM COATED ORAL DAILY
Qty: 90 TABLET | Refills: 0 | Status: SHIPPED | OUTPATIENT
Start: 2023-08-18 | End: 2023-10-25

## 2023-09-14 DIAGNOSIS — E11.21 TYPE 2 DIABETES MELLITUS WITH DIABETIC NEPHROPATHY, WITH LONG-TERM CURRENT USE OF INSULIN (H): ICD-10-CM

## 2023-09-14 DIAGNOSIS — Z79.4 TYPE 2 DIABETES MELLITUS WITH DIABETIC NEPHROPATHY, WITH LONG-TERM CURRENT USE OF INSULIN (H): ICD-10-CM

## 2023-09-15 RX ORDER — PROCHLORPERAZINE 25 MG/1
SUPPOSITORY RECTAL
Qty: 1 EACH | Refills: 3 | Status: SHIPPED | OUTPATIENT
Start: 2023-09-15 | End: 2024-09-18

## 2023-09-15 RX ORDER — INSULIN ASPART 100 [IU]/ML
INJECTION, SOLUTION INTRAVENOUS; SUBCUTANEOUS
Qty: 30 ML | Refills: 0 | Status: SHIPPED | OUTPATIENT
Start: 2023-09-15 | End: 2023-10-25

## 2023-09-15 NOTE — TELEPHONE ENCOUNTER
NOVOLOG FLEXPEN SYR 3ML 5'S 100U/ML     Last Written Prescription Date:  4/10/23  Last Fill Quantity: 30 ml ,   # refills: 0  Last Office Visit : 11/23/22  Future Office visit:  10/25/23    Routing refill request to provider for review/approval because:  Insulin - refilled per clinic

## 2023-10-24 ENCOUNTER — MYC MEDICAL ADVICE (OUTPATIENT)
Dept: ENDOCRINOLOGY | Facility: CLINIC | Age: 58
End: 2023-10-24
Payer: COMMERCIAL

## 2023-10-24 ENCOUNTER — TELEPHONE (OUTPATIENT)
Dept: ENDOCRINOLOGY | Facility: CLINIC | Age: 58
End: 2023-10-24

## 2023-10-24 NOTE — TELEPHONE ENCOUNTER
"Patient is scheduled for 10/25/23. Per 11/23/22 office visit   \"Return in about 6 months (around 5/23/2023) for labs prior to f/up apt.\"    Please advise on lab orders.    Marly Bravo, [facility]  Adult Endocrinology  [facility]    "

## 2023-10-25 ENCOUNTER — DOCUMENTATION ONLY (OUTPATIENT)
Dept: ENDOCRINOLOGY | Facility: CLINIC | Age: 58
End: 2023-10-25

## 2023-10-25 ENCOUNTER — OFFICE VISIT (OUTPATIENT)
Dept: ENDOCRINOLOGY | Facility: CLINIC | Age: 58
End: 2023-10-25
Payer: COMMERCIAL

## 2023-10-25 ENCOUNTER — LAB (OUTPATIENT)
Dept: LAB | Facility: CLINIC | Age: 58
End: 2023-10-25
Payer: COMMERCIAL

## 2023-10-25 VITALS
WEIGHT: 290 LBS | HEART RATE: 65 BPM | BODY MASS INDEX: 41.61 KG/M2 | OXYGEN SATURATION: 95 % | DIASTOLIC BLOOD PRESSURE: 75 MMHG | SYSTOLIC BLOOD PRESSURE: 132 MMHG

## 2023-10-25 DIAGNOSIS — E11.21 TYPE 2 DIABETES MELLITUS WITH DIABETIC NEPHROPATHY, WITH LONG-TERM CURRENT USE OF INSULIN (H): Primary | ICD-10-CM

## 2023-10-25 DIAGNOSIS — E04.2 MULTIPLE THYROID NODULES: ICD-10-CM

## 2023-10-25 DIAGNOSIS — Z79.4 TYPE 2 DIABETES MELLITUS WITH DIABETIC NEPHROPATHY, WITH LONG-TERM CURRENT USE OF INSULIN (H): Primary | ICD-10-CM

## 2023-10-25 DIAGNOSIS — Z23 NEED FOR PROPHYLACTIC VACCINATION AND INOCULATION AGAINST INFLUENZA: ICD-10-CM

## 2023-10-25 DIAGNOSIS — E66.01 MORBID OBESITY, UNSPECIFIED OBESITY TYPE (H): ICD-10-CM

## 2023-10-25 DIAGNOSIS — N18.2 STAGE 2 CHRONIC KIDNEY DISEASE: ICD-10-CM

## 2023-10-25 DIAGNOSIS — I10 ESSENTIAL HYPERTENSION: ICD-10-CM

## 2023-10-25 LAB
ANION GAP SERPL CALCULATED.3IONS-SCNC: 13 MMOL/L (ref 7–15)
BUN SERPL-MCNC: 35.6 MG/DL (ref 8–23)
CALCIUM SERPL-MCNC: 9.3 MG/DL (ref 8.8–10.2)
CHLORIDE SERPL-SCNC: 100 MMOL/L (ref 98–107)
CREAT SERPL-MCNC: 1.14 MG/DL (ref 0.67–1.17)
DEPRECATED HCO3 PLAS-SCNC: 26 MMOL/L (ref 22–29)
EGFRCR SERPLBLD CKD-EPI 2021: 74 ML/MIN/1.73M2
GLUCOSE SERPL-MCNC: 208 MG/DL (ref 70–99)
HBA1C MFR BLD: 6.9 % (ref 0–5.6)
HOLD SPECIMEN: NORMAL
POTASSIUM SERPL-SCNC: 3.8 MMOL/L (ref 3.4–5.3)
SODIUM SERPL-SCNC: 139 MMOL/L (ref 135–145)

## 2023-10-25 PROCEDURE — 99214 OFFICE O/P EST MOD 30 MIN: CPT | Mod: 25 | Performed by: INTERNAL MEDICINE

## 2023-10-25 PROCEDURE — 83036 HEMOGLOBIN GLYCOSYLATED A1C: CPT

## 2023-10-25 PROCEDURE — 36415 COLL VENOUS BLD VENIPUNCTURE: CPT

## 2023-10-25 PROCEDURE — 95251 CONT GLUC MNTR ANALYSIS I&R: CPT | Performed by: INTERNAL MEDICINE

## 2023-10-25 PROCEDURE — 90471 IMMUNIZATION ADMIN: CPT | Performed by: INTERNAL MEDICINE

## 2023-10-25 PROCEDURE — 80048 BASIC METABOLIC PNL TOTAL CA: CPT

## 2023-10-25 PROCEDURE — 90682 RIV4 VACC RECOMBINANT DNA IM: CPT | Performed by: INTERNAL MEDICINE

## 2023-10-25 RX ORDER — PROCHLORPERAZINE 25 MG/1
SUPPOSITORY RECTAL
Qty: 9 EACH | Refills: 3 | Status: SHIPPED | OUTPATIENT
Start: 2023-10-25 | End: 2024-09-26

## 2023-10-25 RX ORDER — INSULIN ASPART 100 [IU]/ML
INJECTION, SOLUTION INTRAVENOUS; SUBCUTANEOUS
Qty: 30 ML | Refills: 0 | OUTPATIENT
Start: 2023-10-25 | End: 2023-10-25

## 2023-10-25 RX ORDER — LISINOPRIL 20 MG/1
20 TABLET ORAL 2 TIMES DAILY
Qty: 180 TABLET | Refills: 3 | Status: SHIPPED | OUTPATIENT
Start: 2023-10-25

## 2023-10-25 RX ORDER — INSULIN ASPART 100 [IU]/ML
INJECTION, SOLUTION INTRAVENOUS; SUBCUTANEOUS
Qty: 30 ML | Refills: 3 | Status: SHIPPED | OUTPATIENT
Start: 2023-10-25

## 2023-10-25 RX ORDER — METFORMIN HCL 500 MG
2000 TABLET, EXTENDED RELEASE 24 HR ORAL
Qty: 360 TABLET | Refills: 3 | Status: SHIPPED | OUTPATIENT
Start: 2023-10-25

## 2023-10-25 RX ORDER — INSULIN DEGLUDEC 200 U/ML
50 INJECTION, SOLUTION SUBCUTANEOUS DAILY
Qty: 23 ML | Refills: 3 | Status: SHIPPED | OUTPATIENT
Start: 2023-10-25

## 2023-10-25 RX ORDER — ATORVASTATIN CALCIUM 80 MG/1
80 TABLET, FILM COATED ORAL DAILY
Qty: 90 TABLET | Refills: 3 | Status: SHIPPED | OUTPATIENT
Start: 2023-10-25

## 2023-10-25 RX ORDER — PROCHLORPERAZINE 25 MG/1
SUPPOSITORY RECTAL
Qty: 1 EACH | Refills: 0 | OUTPATIENT
Start: 2023-10-25 | End: 2023-10-25

## 2023-10-25 NOTE — NURSING NOTE
Kalia Boateng's goals for this visit include:   Chief Complaint   Patient presents with    Follow Up     Diabetes     He requests these members of his care team be copied on today's visit information: YES    PCP: Scottie Boudreaux    Referring Provider:  No referring provider defined for this encounter.    BP (!) 152/82 (BP Location: Left arm, Patient Position: Sitting, Cuff Size: Adult Large)   Pulse 94   Wt 131.5 kg (290 lb)   SpO2 95%   BMI 41.61 kg/m      Do you need any medication refills at today's visit?
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
## 2023-10-25 NOTE — PROGRESS NOTES
Hello,    Patient was seen in lab today for Radulescu lab work. Advised patient of missing orders in chart. We did draw extra tubes, if lab work is needed please place add on orders for lab.    Thank  you   Ness MCGOVERN

## 2023-10-25 NOTE — PATIENT INSTRUCTIONS
Take 5 units Novolog for a muffin   Cover the weekend meals which contain carbs with 10-20 units of Novolog, based on carb content       [facility]-Department of Endocrinology  Diabetes Educators:   Yessenia Wallis, RN and Radha Wei RN  Clinic Nurse: LARISSA Hahn  CMA's: Sophie Luke and Danyel   EMT: Adam  Scheduling/Clinic phone number : [phone]   Clinic Fax: [phone]  On-Call Endocrine at the [place] (after hours/weekends): [phone] option [phone]    Please call the number below to schedule your labs.  [facility]    General [phone]   [facility] [phone]   [place] [phone]   [facility]  [phone]   [facility] [phone]   [place] [phone]   [facility]) [phone]   [facility] Walk-In Only   [place] [phone]   [place] [phone]   [place] [phone]   [place] [phone]     Please reach out to the following centers to schedule your imaging appointment:  Imaging (DEXA, CT, MRI, XRAY)    [facility] ([facility], [facility], [place]) [phone]   [facility] ([place], [place]) [phone]   [facility] ([facility]) [phone]   [facility] ([facility]) [phone]     Appointment Reminders:  * Please bring meter with for staff to download  * If you are due ONLY for an A1C, it is scheduled with the nurse and will be done in clinic. You do not need to schedule a lab appointment. Fasting is not required for an A1C.  * Refill request should be submitted to your pharmacy. They will contact clinic for approval.

## 2023-11-06 DIAGNOSIS — I10 ESSENTIAL HYPERTENSION WITH GOAL BLOOD PRESSURE LESS THAN 140/90: ICD-10-CM

## 2023-11-06 NOTE — RESULT ENCOUNTER NOTE
Kalia,  Your results show your hemoglobin A1c is improving and the rest of your labs are clinically at goal.  Please let me know if you have any questions.    Sincerely,  Dr. Boudreaux

## 2023-11-08 RX ORDER — CHLORTHALIDONE 25 MG/1
25 TABLET ORAL DAILY
Qty: 90 TABLET | Refills: 3 | Status: SHIPPED | OUTPATIENT
Start: 2023-11-08

## 2023-11-08 NOTE — TELEPHONE ENCOUNTER
chlorthalidone (HYGROTON) 25 MG tablet 90 tablet 1 5/10/2023       Last Office Visit: 10/25/23  Future Office visit:  5/1/24     Refill protocol passed  Seema Balderas RN

## 2023-12-04 ENCOUNTER — OFFICE VISIT (OUTPATIENT)
Dept: FAMILY MEDICINE | Facility: CLINIC | Age: 58
End: 2023-12-04
Attending: FAMILY MEDICINE
Payer: COMMERCIAL

## 2023-12-04 VITALS
OXYGEN SATURATION: 97 % | RESPIRATION RATE: 12 BRPM | WEIGHT: 295.8 LBS | BODY MASS INDEX: 42.35 KG/M2 | DIASTOLIC BLOOD PRESSURE: 64 MMHG | TEMPERATURE: 98.4 F | SYSTOLIC BLOOD PRESSURE: 130 MMHG | HEART RATE: 68 BPM | HEIGHT: 70 IN

## 2023-12-04 DIAGNOSIS — I10 BENIGN ESSENTIAL HYPERTENSION: ICD-10-CM

## 2023-12-04 DIAGNOSIS — Z79.4 TYPE 2 DIABETES MELLITUS WITH DIABETIC NEPHROPATHY, WITH LONG-TERM CURRENT USE OF INSULIN (H): Primary | ICD-10-CM

## 2023-12-04 DIAGNOSIS — Z12.11 COLON CANCER SCREENING: ICD-10-CM

## 2023-12-04 DIAGNOSIS — M19.91 PRIMARY OSTEOARTHRITIS, UNSPECIFIED SITE: ICD-10-CM

## 2023-12-04 DIAGNOSIS — E11.21 TYPE 2 DIABETES MELLITUS WITH DIABETIC NEPHROPATHY, WITH LONG-TERM CURRENT USE OF INSULIN (H): Primary | ICD-10-CM

## 2023-12-04 DIAGNOSIS — M1A.09X0 IDIOPATHIC CHRONIC GOUT OF MULTIPLE SITES WITHOUT TOPHUS: ICD-10-CM

## 2023-12-04 DIAGNOSIS — Z87.891 PERSONAL HISTORY OF TOBACCO USE: ICD-10-CM

## 2023-12-04 DIAGNOSIS — Z23 HIGH PRIORITY FOR 2019-NCOV VACCINE: ICD-10-CM

## 2023-12-04 PROCEDURE — G0296 VISIT TO DETERM LDCT ELIG: HCPCS | Performed by: FAMILY MEDICINE

## 2023-12-04 PROCEDURE — 90750 HZV VACC RECOMBINANT IM: CPT | Performed by: FAMILY MEDICINE

## 2023-12-04 PROCEDURE — 90480 ADMN SARSCOV2 VAC 1/ONLY CMP: CPT | Performed by: FAMILY MEDICINE

## 2023-12-04 PROCEDURE — 99214 OFFICE O/P EST MOD 30 MIN: CPT | Mod: 25 | Performed by: FAMILY MEDICINE

## 2023-12-04 PROCEDURE — 90471 IMMUNIZATION ADMIN: CPT | Performed by: FAMILY MEDICINE

## 2023-12-04 PROCEDURE — 91320 SARSCV2 VAC 30MCG TRS-SUC IM: CPT | Performed by: FAMILY MEDICINE

## 2023-12-04 RX ORDER — ALLOPURINOL 300 MG/1
1 TABLET ORAL DAILY
Qty: 90 TABLET | Refills: 4 | Status: SHIPPED | OUTPATIENT
Start: 2023-12-04 | End: 2024-07-11

## 2023-12-04 RX ORDER — AMLODIPINE BESYLATE 10 MG/1
10 TABLET ORAL DAILY
Qty: 90 TABLET | Refills: 4 | Status: SHIPPED | OUTPATIENT
Start: 2023-12-04 | End: 2024-01-27

## 2023-12-04 RX ORDER — MELOXICAM 15 MG/1
15 TABLET ORAL DAILY
Qty: 90 TABLET | Refills: 1 | Status: SHIPPED | OUTPATIENT
Start: 2023-12-04 | End: 2024-01-27

## 2023-12-04 ASSESSMENT — PAIN SCALES - GENERAL: PAINLEVEL: MODERATE PAIN (4)

## 2023-12-04 NOTE — PROGRESS NOTES
"  Assessment & Plan     ASSESSMENT/ORDERS:    ICD-10-CM    1. Type 2 diabetes mellitus with diabetic nephropathy, with long-term current use of insulin (H)  E11.21     Z79.4       2. Primary osteoarthritis, unspecified site  M19.91 meloxicam (MOBIC) 15 MG tablet      3. Benign essential hypertension  I10 amLODIPine (NORVASC) 10 MG tablet      4. Idiopathic chronic gout of multiple sites without tophus  M1A.09X0 allopurinol (ZYLOPRIM) 300 MG tablet      5. High priority for 2019-nCoV vaccine  Z23       6. Personal history of tobacco use  Z87.891 Prof fee: Shared Decision Making for Lung Cancer Screening     CT Chest Lung Cancer Scrn Low Dose wo      7. Colon cancer screening  Z12.11 Colonoscopy Screening  Referral        PLAN:  1.   Medications refilled for all other above noted stable conditions.  Labs ordered as noted above.       Follow-up Visit   Expected date:  Jun 04, 2024 (Approximate)      Follow Up Appointment Details:     Follow-up with whom?: Me    Follow-Up for what?: Adult Preventive    Any Additional Chronic Condition Management?: Hypertension    How?: In Person                               Nicotine/Tobacco Cessation:  He reports that he has been smoking cigarettes. He has a 25.00 pack-year smoking history. He quit smokeless tobacco use about 13 years ago.  His smokeless tobacco use included chew.  Nicotine/Tobacco Cessation Plan:   Information offered: Patient not interested at this time      BMI:   Estimated body mass index is 42.44 kg/m  as calculated from the following:    Height as of this encounter: 1.778 m (5' 10\").    Weight as of this encounter: 134.2 kg (295 lb 12.8 oz).           Scottie Boudreaux MD  [facility]    Iglesia Motta is a 58 year old, presenting for the following health issues:  RECHECK and Imm/Inj (COVID-19 VACCINE)        12/4/2023     7:02 AM   Additional Questions   Roomed by Eduar Rasmussen       History of Present Illness " "      Reason for visit:  Need antibotics for dental apt, Sat 9th    He eats 2-3 servings of fruits and vegetables daily.He consumes 0 sweetened beverage(s) daily.He exercises with enough effort to increase his heart rate 9 or less minutes per day.  He exercises with enough effort to increase his heart rate 3 or less days per week.   He is taking medications regularly.             Diabetes managed by endocrine.  Doing well.    Hypertension at goal.  Is on ACE-I and amlodipine.    Gout well managed with allopurinol.    Reviewed vaccine needs.      Review of Systems         Objective    /64 (BP Location: Right arm, Patient Position: Chair, Cuff Size: Adult Large)   Pulse 68   Temp 98.4  F (36.9  C) (Temporal)   Resp 12   Ht 1.778 m (5' 10\")   Wt 134.2 kg (295 lb 12.8 oz)   SpO2 97%   BMI 42.44 kg/m    Body mass index is 42.44 kg/m .  Physical Exam  Constitutional:       Appearance: Normal appearance. He is well-developed.   Cardiovascular:      Rate and Rhythm: Normal rate and regular rhythm.      Heart sounds: Normal heart sounds, S1 normal and S2 normal. No murmur heard.  Pulmonary:      Effort: Pulmonary effort is normal. No respiratory distress.      Breath sounds: Normal breath sounds. No wheezing, rhonchi or rales.   Neurological:      Mental Status: He is alert.                              "

## 2023-12-04 NOTE — PATIENT INSTRUCTIONS
Lung Cancer Screening   Frequently Asked Questions  If you are at high-risk for lung cancer, getting screened with low-dose computed tomography (LDCT) every year can help save your life. This handout offers answers to some of the most common questions about lung cancer screening. If you have other questions, please call [phone] ([phone]).     What is it?  Lung cancer screening uses special X-ray technology to create an image of your lung tissue. The exam is quick and easy and takes less than 10 seconds. We don t give you any medicine or use any needles. You can eat before and after the exam. You don t need to change your clothes as long as the clothing on your chest doesn t contain metal. But, you do need to be able to hold your breath for at least 6 seconds during the exam.    What is the goal of lung cancer screening?  The goal of lung cancer screening is to save lives. Many times, lung cancer is not found until a person starts having physical symptoms. Lung cancer screening can help detect lung cancer in the earliest stages when it may be easier to treat.    Who should be screened for lung cancer?  We suggest lung cancer screening for anyone who is at high-risk for lung cancer. You are in the high-risk group if you:      are between the ages of 55 and 79, and    have smoked at least 1 pack of cigarettes a day for 20 or more years, and    still smoke or have quit within the past 15 years.    However, if you have a new cough or shortness of breath, you should talk to your doctor before being screened.    Why does it matter if I have symptoms?  Certain symptoms can be a sign that you have a condition in your lungs that should be checked and treated by your doctor. These symptoms include fever, chest pain, a new or changing cough, shortness of breath that you have never felt before, coughing up blood or unexplained weight loss. Having any of these symptoms can greatly affect the results of lung  cancer screening.       Should all smokers get an LDCT lung cancer screening exam?  It depends. Lung cancer screening is for a very specific group of men and women who have a history of heavy smoking over a long period of time (see  Who should be screened for lung cancer  above).  I am in the high-risk group, but have been diagnosed with cancer in the past. Is LDCT lung cancer screening right for me?  In some cases, you should not have LDCT lung screening, such as when your doctor is already following your cancer with CT scan studies. Your doctor will help you decide if LDCT lung screening is right for you.  Do I need to have a screening exam every year?  Yes. If you are in the high-risk group described earlier, you should get an LDCT lung cancer screening exam every year until you are 79, or are no longer willing or able to undergo screening and possible procedures to diagnose and treat lung cancer.  How effective is LDCT at preventing death from lung cancer?  Studies have shown that LDCT lung cancer screening can lower the risk of death from lung cancer by 20 percent in people who are at high-risk.  What are the risks?  There are some risks and limitations of LDCT lung cancer screening. We want to make sure you understand the risks and benefits, so please let us know if you have any questions. Your doctor may want to talk with you more about these risks.    Radiation exposure: As with any exam that uses radiation, there is a very small increased risk of cancer. The amount of radiation in LDCT is small--about the same amount a person would get from a mammogram. Your doctor orders the exam when he or she feels the potential benefits outweigh the risks.    False negatives: No test is perfect, including LDCT. It is possible that you may have a medical condition, including lung cancer, that is not found during your exam. This is called a false negative result.    False positives and more testing: LDCT very often finds  something in the lung that could be cancer, but in fact is not. This is called a false positive result. False positive tests often cause anxiety. To make sure these findings are not cancer, you may need to have more tests. These tests will be done only if you give us permission. Sometimes patients need a treatment that can have side effects, such as a biopsy. For more information on false positives, see  What can I expect from the results?     Findings not related to lung cancer: Your LDCT exam also takes pictures of areas of your body next to your lungs. In a very small number of cases, the CT scan will show an abnormal finding in one of these areas, such as your kidneys, adrenal glands, liver or thyroid. This finding may not be serious, but you may need more tests. Your doctor can help you decide what other tests you may need, if any.  What can I expect from the results?  About 1 out of 4 LDCT exams will find something that may need more tests. Most of the time, these findings are lung nodules. Lung nodules are very small collections of tissue in the lung. These nodules are very common, and the vast majority--more than 97 percent--are not cancer (benign). Most are normal lymph nodes or small areas of scarring from past infections.  But, if a small lung nodule is found to be cancer, the cancer can be cured more than 90 percent of the time. To know if the nodule is cancer, we may need to get more images before your next yearly screening exam. If the nodule has suspicious features (for example, it is large, has an odd shape or grows over time), we will refer you to a specialist for further testing.  Will my doctor also get the results?  Yes. Your doctor will get a copy of your results.  Is it okay to keep smoking now that there s a cancer screening exam?  No. Tobacco is one of the strongest cancer-causing agents. It causes not only lung cancer, but other cancers and cardiovascular (heart) diseases as well. The damage  caused by smoking builds over time. This means that the longer you smoke, the higher your risk of disease. While it is never too late to quit, the sooner you quit, the better.  Where can I find help to quit smoking?  The best way to prevent lung cancer is to stop smoking. If you have already quit smoking, congratulations and keep it up! For help on quitting smoking, please call [place] at 3-192-QUITNOW (1-845.118.1005) or the [employer] at 1-391.614.3339 to find local resources near you.  One-on-one health coaching:  If you d prefer to work individually with a health care provider on tobacco cessation, we offer:      Medication Therapy Management:  Our specially trained pharmacists work closely with you and your doctor to help you quit smoking.  Call [phone] or [phone] (toll free).

## 2023-12-04 NOTE — NURSING NOTE
Prior to immunization administration, verified patients identity using patient s name and date of birth. Please see Immunization Activity for additional information.     Screening Questionnaire for Adult Immunization    Are you sick today?   No   Do you have allergies to medications, food, a vaccine component or latex?   No   Have you ever had a serious reaction after receiving a vaccination?   No   Do you have a long-term health problem with heart, lung, kidney, or metabolic disease (e.g., diabetes), asthma, a blood disorder, no spleen, complement component deficiency, a cochlear implant, or a spinal fluid leak?  Are you on long-term aspirin therapy?   Yes   Do you have cancer, leukemia, HIV/AIDS, or any other immune system problem?   No   Do you have a parent, brother, or sister with an immune system problem?   No   In the past 3 months, have you taken medications that affect  your immune system, such as prednisone, other steroids, or anticancer drugs; drugs for the treatment of rheumatoid arthritis, Crohn s disease, or psoriasis; or have you had radiation treatments?   No   Have you had a seizure, or a brain or other nervous system problem?   No   During the past year, have you received a transfusion of blood or blood    products, or been given immune (gamma) globulin or antiviral drug?   No   For women: Are you pregnant or is there a chance you could become       pregnant during the next month?   No   Have you received any vaccinations in the past 4 weeks?   Yes     Immunization questionnaire was positive for at least one answer.  Notified Dr. Boudreaux.      Patient instructed to remain in clinic for 15 minutes afterwards, and to report any adverse reactions.     Screening performed by Maddie Shaver MA on 12/4/2023 at 7:47 AM.

## 2023-12-04 NOTE — PROGRESS NOTES
Lung Cancer Screening Shared Decision Making Visit     Kalia Boateng, a 58 year old male, is eligible for lung cancer screening    History   Smoking Status     Every Day     Packs/day: 0.50     Years: 50.00     Types: Cigarettes   Smokeless Tobacco     Former     Types: Chew     Quit date: 12/31/2009       I have discussed with patient the risks and benefits of screening for lung cancer with low-dose CT.     The risks include:    radiation exposure: one low dose chest CT has as much ionizing radiation as about 15 chest x-rays, or 6 months of background radiation living in [place]      false positives: most findings/nodules are NOT cancer, but some might still require additional diagnostic evaluation, including biopsy    over-diagnosis: some slow growing cancers that might never have been clinically significant will be detected and treated unnecessarily     The benefit of early detection of lung cancer is contingent upon adherence to annual screening or more frequent follow up if indicated.     Furthermore, to benefit from screening, Kalia must be willing and able to undergo diagnostic procedures, if indicated. Although no specific guide is available for determining severity of comorbidities, it is reasonable to withhold screening in patients who have greater mortality risk from other diseases.     We did discuss that the best way to prevent lung cancer is to not smoke.    Some patients may value a numeric estimation of lung cancer risk when evaluating if lung cancer screening is right for them, here is one calculator:    ShouldIScreen

## 2024-01-13 ENCOUNTER — TRANSFERRED RECORDS (OUTPATIENT)
Dept: HEALTH INFORMATION MANAGEMENT | Facility: CLINIC | Age: 59
End: 2024-01-13
Payer: COMMERCIAL

## 2024-01-13 LAB — RETINOPATHY: NEGATIVE

## 2024-01-24 DIAGNOSIS — M19.91 PRIMARY OSTEOARTHRITIS, UNSPECIFIED SITE: ICD-10-CM

## 2024-01-24 RX ORDER — MELOXICAM 15 MG/1
7.5 TABLET ORAL DAILY
Qty: 90 TABLET | Refills: 2 | OUTPATIENT
Start: 2024-01-24

## 2024-01-27 ENCOUNTER — MYC REFILL (OUTPATIENT)
Dept: FAMILY MEDICINE | Facility: CLINIC | Age: 59
End: 2024-01-27
Payer: COMMERCIAL

## 2024-01-27 DIAGNOSIS — I10 BENIGN ESSENTIAL HYPERTENSION: ICD-10-CM

## 2024-01-27 DIAGNOSIS — M19.91 PRIMARY OSTEOARTHRITIS, UNSPECIFIED SITE: ICD-10-CM

## 2024-01-29 RX ORDER — AMLODIPINE BESYLATE 10 MG/1
10 TABLET ORAL DAILY
Qty: 90 TABLET | Refills: 3 | Status: SHIPPED | OUTPATIENT
Start: 2024-01-29

## 2024-01-29 RX ORDER — MELOXICAM 15 MG/1
15 TABLET ORAL DAILY
Qty: 90 TABLET | Refills: 0 | Status: SHIPPED | OUTPATIENT
Start: 2024-01-29 | End: 2024-04-29

## 2024-04-29 ENCOUNTER — MYC REFILL (OUTPATIENT)
Dept: FAMILY MEDICINE | Facility: CLINIC | Age: 59
End: 2024-04-29
Payer: COMMERCIAL

## 2024-04-29 DIAGNOSIS — M19.91 PRIMARY OSTEOARTHRITIS, UNSPECIFIED SITE: ICD-10-CM

## 2024-04-30 RX ORDER — MELOXICAM 15 MG/1
15 TABLET ORAL DAILY
Qty: 90 TABLET | Refills: 0 | Status: SHIPPED | OUTPATIENT
Start: 2024-04-30 | End: 2024-06-06

## 2024-05-01 ENCOUNTER — OFFICE VISIT (OUTPATIENT)
Dept: ENDOCRINOLOGY | Facility: CLINIC | Age: 59
End: 2024-05-01
Payer: COMMERCIAL

## 2024-05-01 ENCOUNTER — LAB (OUTPATIENT)
Dept: LAB | Facility: CLINIC | Age: 59
End: 2024-05-01
Payer: COMMERCIAL

## 2024-05-01 ENCOUNTER — HOSPITAL ENCOUNTER (OUTPATIENT)
Dept: ULTRASOUND IMAGING | Facility: CLINIC | Age: 59
Discharge: HOME | End: 2024-05-01
Attending: INTERNAL MEDICINE | Admitting: INTERNAL MEDICINE
Payer: COMMERCIAL

## 2024-05-01 VITALS
SYSTOLIC BLOOD PRESSURE: 153 MMHG | RESPIRATION RATE: 18 BRPM | WEIGHT: 286 LBS | BODY MASS INDEX: 41.04 KG/M2 | HEART RATE: 72 BPM | DIASTOLIC BLOOD PRESSURE: 86 MMHG | OXYGEN SATURATION: 96 %

## 2024-05-01 DIAGNOSIS — E26.09 PRIMARY HYPERALDOSTERONISM (H): ICD-10-CM

## 2024-05-01 DIAGNOSIS — E66.813 CLASS 3 OBESITY: ICD-10-CM

## 2024-05-01 DIAGNOSIS — E11.21 TYPE 2 DIABETES MELLITUS WITH DIABETIC NEPHROPATHY, WITH LONG-TERM CURRENT USE OF INSULIN (H): ICD-10-CM

## 2024-05-01 DIAGNOSIS — E04.2 MULTIPLE THYROID NODULES: ICD-10-CM

## 2024-05-01 DIAGNOSIS — Z79.4 TYPE 2 DIABETES MELLITUS WITH DIABETIC NEPHROPATHY, WITH LONG-TERM CURRENT USE OF INSULIN (H): ICD-10-CM

## 2024-05-01 DIAGNOSIS — I10 HYPERTENSION, UNSPECIFIED TYPE: ICD-10-CM

## 2024-05-01 DIAGNOSIS — Z79.4 TYPE 2 DIABETES MELLITUS WITH DIABETIC NEPHROPATHY, WITH LONG-TERM CURRENT USE OF INSULIN (H): Primary | ICD-10-CM

## 2024-05-01 DIAGNOSIS — E11.21 TYPE 2 DIABETES MELLITUS WITH DIABETIC NEPHROPATHY, WITH LONG-TERM CURRENT USE OF INSULIN (H): Primary | ICD-10-CM

## 2024-05-01 DIAGNOSIS — E26.9 HYPERALDOSTERONISM (H): ICD-10-CM

## 2024-05-01 LAB
ALBUMIN SERPL BCG-MCNC: 4.1 G/DL (ref 3.5–5.2)
ALP SERPL-CCNC: 123 U/L (ref 40–150)
ALT SERPL W P-5'-P-CCNC: 44 U/L (ref 0–70)
ANION GAP SERPL CALCULATED.3IONS-SCNC: 12 MMOL/L (ref 7–15)
AST SERPL W P-5'-P-CCNC: 49 U/L (ref 0–45)
BILIRUB SERPL-MCNC: 0.3 MG/DL
BUN SERPL-MCNC: 24.7 MG/DL (ref 8–23)
CALCIUM SERPL-MCNC: 9.3 MG/DL (ref 8.8–10.2)
CHLORIDE SERPL-SCNC: 100 MMOL/L (ref 98–107)
CHOLEST SERPL-MCNC: 226 MG/DL
CREAT SERPL-MCNC: 1.17 MG/DL (ref 0.67–1.17)
DEPRECATED HCO3 PLAS-SCNC: 29 MMOL/L (ref 22–29)
EGFRCR SERPLBLD CKD-EPI 2021: 71 ML/MIN/1.73M2
FASTING STATUS PATIENT QL REPORTED: YES
GLUCOSE SERPL-MCNC: 110 MG/DL (ref 70–99)
HBA1C MFR BLD: 7.3 % (ref 0–5.6)
HBA1C MFR BLD: 7.4 %
HCT VFR BLD AUTO: 44.7 % (ref 40–53)
HDLC SERPL-MCNC: 35 MG/DL
LDLC SERPL CALC-MCNC: 149 MG/DL
NONHDLC SERPL-MCNC: 191 MG/DL
POTASSIUM SERPL-SCNC: 3.7 MMOL/L (ref 3.4–5.3)
PROT SERPL-MCNC: 7.4 G/DL (ref 6.4–8.3)
SODIUM SERPL-SCNC: 141 MMOL/L (ref 135–145)
TRIGL SERPL-MCNC: 208 MG/DL
TSH SERPL DL<=0.005 MIU/L-ACNC: 1.11 UIU/ML (ref 0.3–4.2)

## 2024-05-01 PROCEDURE — 99215 OFFICE O/P EST HI 40 MIN: CPT | Performed by: INTERNAL MEDICINE

## 2024-05-01 PROCEDURE — 83036 HEMOGLOBIN GLYCOSYLATED A1C: CPT

## 2024-05-01 PROCEDURE — 36415 COLL VENOUS BLD VENIPUNCTURE: CPT

## 2024-05-01 PROCEDURE — 84443 ASSAY THYROID STIM HORMONE: CPT

## 2024-05-01 PROCEDURE — 80053 COMPREHEN METABOLIC PANEL: CPT

## 2024-05-01 PROCEDURE — 85014 HEMATOCRIT: CPT

## 2024-05-01 PROCEDURE — 76536 US EXAM OF HEAD AND NECK: CPT

## 2024-05-01 PROCEDURE — 82043 UR ALBUMIN QUANTITATIVE: CPT

## 2024-05-01 PROCEDURE — 80061 LIPID PANEL: CPT

## 2024-05-01 PROCEDURE — 83036 HEMOGLOBIN GLYCOSYLATED A1C: CPT | Mod: 91 | Performed by: INTERNAL MEDICINE

## 2024-05-01 PROCEDURE — 82570 ASSAY OF URINE CREATININE: CPT

## 2024-05-01 NOTE — NURSING NOTE
Kalia Boateng's goals for this visit include:   Chief Complaint   Patient presents with    Follow Up     DMII       He requests these members of his care team be copied on today's visit information: yes    PCP: Scottie Boudreaux    Referring Provider:  Scottie Boudreaux MD  [address] [place] [address]    BP (!) 153/86 (BP Location: Left arm, Patient Position: Sitting, Cuff Size: Adult Large)   Pulse 72   Resp 18   Wt 129.7 kg (286 lb)   SpO2 96%   BMI 41.04 kg/m      Do you need any medication refills at today's visit? Yes    Adam Lynch, EMT

## 2024-05-01 NOTE — PATIENT INSTRUCTIONS
For meals with less carbohydrates, decrease the amount of Novolog to 15 or even 10 units   For evelated BG before dinner and at bedtime, you can use the following correction Novolog:   Do Not give Correction Insulin if BG less than 140.   For Pre-Meal  - 165 give 1 unit.   For Pre-Meal  - 190 give 2 units.   For Pre-Meal  - 215 give 3 units.   For Pre-Meal  - 240 give 4 units.   For Pre-Meal  - 265 give 5 units.   For Pre-Meal  - 290 give 6 units.   For Pre-Meal  - 315 give 7 units.   For Pre-Meal  - 340 give 8 units.   For Pre-Meal  - 365 give 9 units.  For Pre-Meal  - 390 give 10 units   For Pre-Meal BG greater than or equal to 391 give 11 units  To be given with prandial insulin, and based on pre-meal blood glucose.       Welcome to the [facility]     Our Endocrinology Clinics are here to provide you with a team-based, collaborative approach in the diagnosis and treatment of patients with diabetes and endocrine disorders. The team is made up of Physicians, Physician Assistants, Certified Diabetes Educators, Registered Nurses, Medical Assistants, Emergency Medical Technicians, and many others, all of whom have the unified goal of providing our patients with high quality care.     Please see below for some helpful tips to best navigate and use the [facility]:     [place] Respect: At [facility], we are committed to a respectful and safe space for all patients, visitors, and staff.  We believe that mutual respect between patients and their care team is the foundation of quality care.  It is our expectation that you will be treated with respect by your care team.  In turn, we ask that all communication with the care team (written and verbal) be respectful and free from profanity, threatening, or abusive language.  Disrespectful communication undermines our therapeutic relationship  with you and may result in us being unable to continue to provide your care.    Refills: A provider must see you at least annually to prescribe and refill medications. This is to ensure your safety as well as meet insurance and compliance regulations.    Scheduling: Many of our Providers offer both in-person or video visits. Please call to schedule any needed follow ups as soon as possible because our provider schedules fill up very quickly. Our care team has the right to require an in-person visit when they believe that it is medically necessary. Please remember that for any virtual visits, you must be in the state of [place] at the time of the visit, otherwise we are unable to see you and you will need to be rescheduled.    Missed Appointments: If you need to cancel or miss your scheduled appointment, please call the clinic at [phone] to reschedule.  Please note if you repeatedly miss appointments or repeatedly miss appointments without calling to inform us ahead of time (no-show), the clinic may elect to not allow you to reschedule without speaking to a manager, may require a Partnership In Care Agreement prior to rescheduling, or could result in you no longer being able to receive care from the clinic. Providing the clinic with timely notification if you have to miss an appointment, allows us to better serve the needs of all of our patients.    Primary Care Provider: Our Endocrinologists are Specialists in their field. We expect you to have a Primary Care Provider established to handle any needs outside of your diabetes and endocrine care.  We would be happy to assist you find a Primary Care Provider, if you do not have one.    [employer]OVE[employer]: [employer] is a wonderful resource that allows you access to your Care Team via online or the misha. Please ask a member of the team if you would like help creating an account. Please note that it may take up to 2 business days for a response. [employer] messages are not reviewed  on weekends or after business hours.  Emergent or urgent care needs should never be communicated via [employer].  If you experience a medical emergency call 911 or go to the nearest emergency room.    Labs: It is recommended that you stay within the [facility] for labs but you are welcome to obtain ordered labs (with some exceptions) from any location of your choice as long as they are able to complete and process the needed labs. If you need us to fax orders to your preferred lab, please provide us the name and fax number of the lab you would like to go to so we can fax the orders. If your labs are drawn outside of the [facility], please have them fax the results to [phone] ([place]) or [phone] ([place]) or via [facility]. It is your responsibility to ensure that outside lab results are sent to us.    We look forward to working with you. Please do not hesitate to reach out with any questions.    Thank you,    The Endocrine Team    [facility] Address:   [place] Address:     [address] [place] [address]    Phone: [phone]  Fax: [phone]   [address] [place] [address]    Phone: [phone]  Fax: [phone]     [place] Cost Estimate Phone Number: [phone]    General Lab and Imaging Scheduling Phone Number: [phone]
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
## 2024-05-02 LAB
CREAT UR-MCNC: 99.4 MG/DL
MICROALBUMIN UR-MCNC: 1849 MG/L
MICROALBUMIN/CREAT UR: 1860.16 MG/G CR (ref 0–17)

## 2024-05-03 NOTE — RESULT ENCOUNTER NOTE
There is a significant amount of proteins in the urine.  Please let me know if your blood pressure at home is elevated.

## 2024-05-06 ENCOUNTER — MYC MEDICAL ADVICE (OUTPATIENT)
Dept: FAMILY MEDICINE | Facility: CLINIC | Age: 59
End: 2024-05-06
Payer: COMMERCIAL

## 2024-05-06 DIAGNOSIS — M17.12 PRIMARY OSTEOARTHRITIS OF LEFT KNEE: Primary | ICD-10-CM

## 2024-05-06 PROBLEM — M17.11 PRIMARY OSTEOARTHRITIS OF RIGHT KNEE: Status: ACTIVE | Noted: 2024-05-06

## 2024-05-08 DIAGNOSIS — M17.12 PRIMARY OSTEOARTHRITIS OF LEFT KNEE: Primary | ICD-10-CM

## 2024-05-15 ENCOUNTER — OFFICE VISIT (OUTPATIENT)
Dept: ORTHOPEDICS | Facility: CLINIC | Age: 59
End: 2024-05-15
Attending: FAMILY MEDICINE
Payer: COMMERCIAL

## 2024-05-15 ENCOUNTER — ANCILLARY PROCEDURE (OUTPATIENT)
Dept: GENERAL RADIOLOGY | Facility: CLINIC | Age: 59
End: 2024-05-15
Attending: NURSE PRACTITIONER
Payer: COMMERCIAL

## 2024-05-15 VITALS
WEIGHT: 284.5 LBS | DIASTOLIC BLOOD PRESSURE: 83 MMHG | BODY MASS INDEX: 40.73 KG/M2 | HEIGHT: 70 IN | TEMPERATURE: 98.2 F | SYSTOLIC BLOOD PRESSURE: 152 MMHG

## 2024-05-15 DIAGNOSIS — M17.12 PRIMARY OSTEOARTHRITIS OF LEFT KNEE: ICD-10-CM

## 2024-05-15 PROCEDURE — 73564 X-RAY EXAM KNEE 4 OR MORE: CPT | Mod: TC | Performed by: RADIOLOGY

## 2024-05-15 PROCEDURE — 99203 OFFICE O/P NEW LOW 30 MIN: CPT | Performed by: ORTHOPAEDIC SURGERY

## 2024-05-15 ASSESSMENT — KOOS JR
HOW SEVERE IS YOUR KNEE STIFFNESS AFTER FIRST WAKING IN MORNING: MODERATE
KOOS JR SCORING: 63.78
STANDING UPRIGHT: MILD
STRAIGHTENING KNEE FULLY: MILD
GOING UP OR DOWN STAIRS: MILD
RISING FROM SITTING: MILD
BENDING TO THE FLOOR TO PICK UP OBJECT: MODERATE
TWISING OR PIVOTING ON KNEE: MILD

## 2024-05-15 NOTE — LETTER
5/15/2024         RE: Kalia Boateng  [address] [address] [place] [id number]        Dear Colleague,    Thank you for referring your patient, Kalia Boateng, to the [facility]. Please see a copy of my visit note below.    ORTHOPEDIC CONSULT      Chief Complaint: Kalia Boateng is a 59 year old male who is being seen for   Chief Complaint   Patient presents with     Left Knee - Pain       History of Present Illness:   Presents the office with left knee pain.  Previous medial unicompartmental arthroplasty in 2012.  No issues postoperatively.  Healed well.  He did quite well.  Couple years ago started developing some soreness and pain in the knee.  Would come and go.  Typically did not impact activities.  It has gotten worse over the recent years.  Taking meloxicam which is helpful but continues to have pain with activity.  Also taken Tylenol.  No recent traumas.  Gait has difficulty with kneeling.    History of diabetes.  Last hemoglobin A1c of 7.3 on May 1, 2024    Patient's past medical, surgical, social and family histories reviewed.     Past Medical History:   Diagnosis Date     AC joint arthropathy 11/6/2015     Closed fracture of shaft of left humerus 7/27/2015     Diagnosis updated by automated process. Provider to review and confirm.     Closed fracture of shaft of left humerus with routine healing 10/9/2015     Diagnosis updated by automated process. Provider to review and confirm.     Diabetic eye exam (H) 09/07/11     Dysfunction of left rotator cuff 10/9/2015     Essential hypertension      Gout      NONSPECIFIC MEDICAL HISTORY 1980    Jaw fracture     CYRIL (obstructive sleep apnea) 2011     Osteoarthrosis, unspecified whether generalized or localized, lower leg     left knee     Other and unspecified hyperlipidemia      Proteinuria      Type II or unspecified type diabetes mellitus without mention of complication, not stated as uncontrolled        Past Surgical History:    Procedure Laterality Date     EXCISE LESION FACE Right 1/24/2017    Procedure: EXCISE LESION FACE;  Surgeon: Bhanu Graham MD;  Location: [facility]     HC KNEE SCOPE, DIAGNOSTIC      Arthroscopy, Knee     HC KNEE SCOPE,MED/LAT MENISECTOMY  1/26/2005     Arthroscopic partial medial meniscectomy, right knee.     HC REPAIR ROTATOR CUFF,ACUTE  1990's    Rt Rotator Cuff Repair     HC VASECTOMY UNILAT/BILAT W POSTOP SEMEN  1991    Vasectomy     INJECT JOINT SACROILIAC Bilateral 6/24/2015    Procedure: INJECT JOINT SACROILIAC;  Surgeon: James Leahy MD;  Location: [facility]     JOINT REPLACEMTN, KNEE RT/LT  11/11/09    Medial unicompartmental arthroplasty, left knee.     ZZHC ARTHROTOMY W/OPEN MENISCUS REPAIR  12/05/2002    1)Arthroscopic partial medial meniscectomy, left knee.  2)Chondroplasty, medial femoral condyle, left knee.  3)Debridement of medial plica, arthroscopic, left knee.       Medications:  Current Outpatient Medications   Medication Sig Dispense Refill     acetaminophen (TYLENOL) 650 MG CR tablet Take 1,300 mg by mouth 2 times daily Morning and noon       allopurinol (ZYLOPRIM) 300 MG tablet Take 1 tablet (300 mg) by mouth daily 90 tablet 4     amLODIPine (NORVASC) 10 MG tablet Take 1 tablet (10 mg) by mouth daily 90 tablet 3     aspirin 81 MG EC tablet Take 1 tablet (81 mg) by mouth daily 90 tablet 3     atorvastatin (LIPITOR) 80 MG tablet Take 1 tablet (80 mg) by mouth daily 90 tablet 3     B-D U/F 31G X 8 MM insulin pen needle USE 2 PEN NEEDLES DAILY OR AS DIRECTED 200 each 3     blood glucose (ACCU-CHEK GUIDE) test strip USED TO CHECK BLOOD SUGAR 3-4 TIMES DAILY OR AS DIRECTED. 400 strip 3     blood glucose monitoring (ACCU-CHEK FASTCLIX) lancets Use to test blood sugar 4 times daily or as directed. 408 each 3     chlorthalidone (HYGROTON) 25 MG tablet Take 1 tablet (25 mg) by mouth daily 90 tablet 3     Continuous Blood Gluc Sensor (DEXCOM G6 SENSOR) MISC Change every 10 days. 9 each 3     Continuous  Blood Gluc Transmit (DEXCOM G6 TRANSMITTER) MISC CHANGE EVERY 3 MONTHS 1 each 3     empagliflozin (JARDIANCE) 25 MG TABS tablet Take 1 tablet (25 mg) by mouth daily 90 tablet 3     Glucagon (GVOKE HYPOPEN) 1 MG/0.2ML pen Inject the contents of 1 device under the skin into lower abdomen, outer thigh, or outer upper arm as needed for hypoglycemia. If no response after 15 minutes, additional 1 mg dose from a new device may be injected while waiting for emergency assistance. 2 mL 1     insulin aspart (NOVOLOG FLEXPEN) 100 UNIT/ML pen INJECT 10 UNITS OF NOVOLOG UNDER THE SKIN FOR MEALS LIKE OATMEAL OR CREAM OF WHEAT IN THE MORNING AND 20 UNITS FOR DINNER. 30 mL 3     insulin degludec (TRESIBA FLEXTOUCH) 200 UNIT/ML pen Inject 50 Units Subcutaneous daily 23 mL 3     lisinopril (ZESTRIL) 20 MG tablet Take 1 tablet (20 mg) by mouth 2 times daily 180 tablet 3     meloxicam (MOBIC) 15 MG tablet Take 1 tablet (15 mg) by mouth daily 90 tablet 0     metFORMIN (GLUCOPHAGE XR) 500 MG 24 hr tablet Take 4 tablets (2,000 mg) by mouth daily (with dinner) 360 tablet 3     order for DME Resmed Aircurve 10 auto bilevel 17/11 cm, Mirage Fx Wide nasal mask w/chin strap. 1 Units 1     ORDER FOR DME Equipment being ordered: CONTROL SOLUTION for checking BS. 1 Bottle 3     tirzepatide (MOUNJARO) 2.5 MG/0.5ML pen Inject 2.5 mg Subcutaneous once a week 6 mL 3     No current facility-administered medications for this visit.       No Known Allergies    Social History     Occupational History     Occupation:      Employer: [employer]   Tobacco Use     Smoking status: Every Day     Current packs/day: 0.50     Average packs/day: 0.5 packs/day for 50.0 years (25.0 ttl pk-yrs)     Types: Cigarettes     Smokeless tobacco: Former     Types: Chew     Quit date: 12/31/2009   Vaping Use     Vaping status: Never Used   Substance and Sexual Activity     Alcohol use: Yes     Alcohol/week: 1.7 standard drinks of alcohol     Drug use: No     Sexual  "activity: Yes     Partners: Female       Family History   Problem Relation Age of Onset     Lipids Mother      Hypertension Mother      Cancer Paternal Uncle         x3     Heart Disease Father      Prostate Cancer Father        REVIEW OF SYSTEMS  10 point review systems performed otherwise negative as noted as per history of present illness.    Physical Exam:  Vitals: BP (!) 152/83   Temp 98.2  F (36.8  C)   Ht 1.778 m (5' 10\")   Wt 129 kg (284 lb 8 oz)   BMI 40.82 kg/m    BMI= Body mass index is 40.82 kg/m .  Constitutional: healthy, alert and no acute distress   Psychiatric: mentation appears normal and affect normal/bright  NEURO: no focal deficits  RESP: Normal with easy respirations and no use of accessory muscles to breathe, no audible wheezing or retractions  CV: LLE:  midcalf and down-  non-pitting edema and chronic pigmentation changes         Regular rate and rhythm by palpation  SKIN: No erythema, rashes, excoriation, or breakdown. No evidence of infection.   JOINT/EXTREMITIES:left knee: Shows a well-healed medial based anterior incision.  Small effusion.  Tenderness along the lateral joint line.  Some laxity lateral with varus stressing.  Collateral ligaments are intact.  Active motion 0-110 degrees.  No evidence of infection.  No soft tissue or bursal swelling.     GAIT: not tested     Diagnostic Modalities:  Left knee x-ray: No acute fractures or dislocation.  Medial unicompartmental arthroplasty in place.  No evidence of lucency.  Bone-on-bone lateral compartment arthritis.  Mild to moderate lateral facet narrowing  Independent visualization of the images was performed.      Impression: left knee primary osteoarthritis with prior medial unicompartmental arthroplasty    Plan:  All of the above pertinent physical exam and imaging modalities findings was reviewed with Kalia and his wife.    Treatment options discussed.  He is been working on weight loss and has actually lost 65 pounds.  He continues " to work on it.  We discussed the knee.  It is presentation is consistent with the radiographs showing arthritis in the lateral compartment.  We discussed treatment options including rest activity modification anti-inflammatories and physical therapy as well as revision surgery.  He has no real interest in surgery at this point.  He is in a continue the meloxicam.  We did discuss physical therapy.  A referral was placed.  He is also can continue to work on his weight loss.        Return to clinic 6, week(s), PRN, or sooner as needed for changes.  Re-x-ray on return: No    Cirilo Chamorro D.O.      Again, thank you for allowing me to participate in the care of your patient.        Sincerely,        Jesse Chamorro, DO

## 2024-05-15 NOTE — PROGRESS NOTES
ORTHOPEDIC CONSULT      Chief Complaint: Kalia Boateng is a 59 year old male who is being seen for   Chief Complaint   Patient presents with    Left Knee - Pain       History of Present Illness:   Presents the office with left knee pain.  Previous medial unicompartmental arthroplasty in 2012.  No issues postoperatively.  Healed well.  He did quite well.  Couple years ago started developing some soreness and pain in the knee.  Would come and go.  Typically did not impact activities.  It has gotten worse over the recent years.  Taking meloxicam which is helpful but continues to have pain with activity.  Also taken Tylenol.  No recent traumas.  Gait has difficulty with kneeling.    History of diabetes.  Last hemoglobin A1c of 7.3 on May 1, 2024    Patient's past medical, surgical, social and family histories reviewed.     Past Medical History:   Diagnosis Date    AC joint arthropathy 11/6/2015    Closed fracture of shaft of left humerus 7/27/2015     Diagnosis updated by automated process. Provider to review and confirm.    Closed fracture of shaft of left humerus with routine healing 10/9/2015     Diagnosis updated by automated process. Provider to review and confirm.    Diabetic eye exam (H) 09/07/11    Dysfunction of left rotator cuff 10/9/2015    Essential hypertension     Gout     NONSPECIFIC MEDICAL HISTORY 1980    Jaw fracture    CYRIL (obstructive sleep apnea) 2011    Osteoarthrosis, unspecified whether generalized or localized, lower leg     left knee    Other and unspecified hyperlipidemia     Proteinuria     Type II or unspecified type diabetes mellitus without mention of complication, not stated as uncontrolled        Past Surgical History:   Procedure Laterality Date    EXCISE LESION FACE Right 1/24/2017    Procedure: EXCISE LESION FACE;  Surgeon: Bhanu Graham MD;  Location:  OR     KNEE SCOPE, DIAGNOSTIC      Arthroscopy, Knee     KNEE SCOPE,MED/LAT MENISECTOMY  1/26/2005     Arthroscopic  partial medial meniscectomy, right knee.    HC REPAIR ROTATOR CUFF,ACUTE  1990's    Rt Rotator Cuff Repair    HC VASECTOMY UNILAT/BILAT W POSTOP SEMEN  1991    Vasectomy    INJECT JOINT SACROILIAC Bilateral 6/24/2015    Procedure: INJECT JOINT SACROILIAC;  Surgeon: James Leahy MD;  Location: [facility]    JOINT REPLACEMTN, KNEE RT/LT  11/11/09    Medial unicompartmental arthroplasty, left knee.    ZZHC ARTHROTOMY W/OPEN MENISCUS REPAIR  12/05/2002    1)Arthroscopic partial medial meniscectomy, left knee.  2)Chondroplasty, medial femoral condyle, left knee.  3)Debridement of medial plica, arthroscopic, left knee.       Medications:  Current Outpatient Medications   Medication Sig Dispense Refill    acetaminophen (TYLENOL) 650 MG CR tablet Take 1,300 mg by mouth 2 times daily Morning and noon      allopurinol (ZYLOPRIM) 300 MG tablet Take 1 tablet (300 mg) by mouth daily 90 tablet 4    amLODIPine (NORVASC) 10 MG tablet Take 1 tablet (10 mg) by mouth daily 90 tablet 3    aspirin 81 MG EC tablet Take 1 tablet (81 mg) by mouth daily 90 tablet 3    atorvastatin (LIPITOR) 80 MG tablet Take 1 tablet (80 mg) by mouth daily 90 tablet 3    B-D U/F 31G X 8 MM insulin pen needle USE 2 PEN NEEDLES DAILY OR AS DIRECTED 200 each 3    blood glucose (ACCU-CHEK GUIDE) test strip USED TO CHECK BLOOD SUGAR 3-4 TIMES DAILY OR AS DIRECTED. 400 strip 3    blood glucose monitoring (ACCU-CHEK FASTCLIX) lancets Use to test blood sugar 4 times daily or as directed. 408 each 3    chlorthalidone (HYGROTON) 25 MG tablet Take 1 tablet (25 mg) by mouth daily 90 tablet 3    Continuous Blood Gluc Sensor (DEXCOM G6 SENSOR) MISC Change every 10 days. 9 each 3    Continuous Blood Gluc Transmit (DEXCOM G6 TRANSMITTER) MISC CHANGE EVERY 3 MONTHS 1 each 3    empagliflozin (JARDIANCE) 25 MG TABS tablet Take 1 tablet (25 mg) by mouth daily 90 tablet 3    Glucagon (GVOKE HYPOPEN) 1 MG/0.2ML pen Inject the contents of 1 device under the skin into lower abdomen,  outer thigh, or outer upper arm as needed for hypoglycemia. If no response after 15 minutes, additional 1 mg dose from a new device may be injected while waiting for emergency assistance. 2 mL 1    insulin aspart (NOVOLOG FLEXPEN) 100 UNIT/ML pen INJECT 10 UNITS OF NOVOLOG UNDER THE SKIN FOR MEALS LIKE OATMEAL OR CREAM OF WHEAT IN THE MORNING AND 20 UNITS FOR DINNER. 30 mL 3    insulin degludec (TRESIBA FLEXTOUCH) 200 UNIT/ML pen Inject 50 Units Subcutaneous daily 23 mL 3    lisinopril (ZESTRIL) 20 MG tablet Take 1 tablet (20 mg) by mouth 2 times daily 180 tablet 3    meloxicam (MOBIC) 15 MG tablet Take 1 tablet (15 mg) by mouth daily 90 tablet 0    metFORMIN (GLUCOPHAGE XR) 500 MG 24 hr tablet Take 4 tablets (2,000 mg) by mouth daily (with dinner) 360 tablet 3    order for DME Resmed Aircurve 10 auto bilevel 17/11 cm, Mir[employer] Fx Wide nasal mask w/chin strap. 1 Units 1    ORDER FOR DME Equipment being ordered: CONTROL SOLUTION for checking BS. 1 Bottle 3    tirzepatide (MOUNJARO) 2.5 MG/0.5ML pen Inject 2.5 mg Subcutaneous once a week 6 mL 3     No current facility-administered medications for this visit.       No Known Allergies    Social History     Occupational History    Occupation:      Employer: [employer]   Tobacco Use    Smoking status: Every Day     Current packs/day: 0.50     Average packs/day: 0.5 packs/day for 50.0 years (25.0 ttl pk-yrs)     Types: Cigarettes    Smokeless tobacco: Former     Types: Chew     Quit date: 12/31/2009   Vaping Use    Vaping status: Never Used   Substance and Sexual Activity    Alcohol use: Yes     Alcohol/week: 1.7 standard drinks of alcohol    Drug use: No    Sexual activity: Yes     Partners: Female       Family History   Problem Relation Age of Onset    Lipids Mother     Hypertension Mother     Cancer Paternal Uncle         x3    Heart Disease Father     Prostate Cancer Father        REVIEW OF SYSTEMS  10 point review systems performed otherwise negative as noted as  "per history of present illness.    Physical Exam:  Vitals: BP (!) 152/83   Temp 98.2  F (36.8  C)   Ht 1.778 m (5' 10\")   Wt 129 kg (284 lb 8 oz)   BMI 40.82 kg/m    BMI= Body mass index is 40.82 kg/m .  Constitutional: healthy, alert and no acute distress   Psychiatric: mentation appears normal and affect normal/bright  NEURO: no focal deficits  RESP: Normal with easy respirations and no use of accessory muscles to breathe, no audible wheezing or retractions  CV: LLE:  midcalf and down-  non-pitting edema and chronic pigmentation changes         Regular rate and rhythm by palpation  SKIN: No erythema, rashes, excoriation, or breakdown. No evidence of infection.   JOINT/EXTREMITIES:left knee: Shows a well-healed medial based anterior incision.  Small effusion.  Tenderness along the lateral joint line.  Some laxity lateral with varus stressing.  Collateral ligaments are intact.  Active motion 0-110 degrees.  No evidence of infection.  No soft tissue or bursal swelling.     GAIT: not tested     Diagnostic Modalities:  Left knee x-ray: No acute fractures or dislocation.  Medial unicompartmental arthroplasty in place.  No evidence of lucency.  Bone-on-bone lateral compartment arthritis.  Mild to moderate lateral facet narrowing  Independent visualization of the images was performed.      Impression: left knee primary osteoarthritis with prior medial unicompartmental arthroplasty    Plan:  All of the above pertinent physical exam and imaging modalities findings was reviewed with Kalia and his wife.    Treatment options discussed.  He is been working on weight loss and has actually lost 65 pounds.  He continues to work on it.  We discussed the knee.  It is presentation is consistent with the radiographs showing arthritis in the lateral compartment.  We discussed treatment options including rest activity modification anti-inflammatories and physical therapy as well as revision surgery.  He has no real interest in " surgery at this point.  He is in a continue the meloxicam.  We did discuss physical therapy.  A referral was placed.  He is also can continue to work on his weight loss.        Return to clinic 6, week(s), PRN, or sooner as needed for changes.  Re-x-ray on return: No    Cirilo Chamorro D.O.

## 2024-05-17 ENCOUNTER — TELEPHONE (OUTPATIENT)
Dept: ENDOCRINOLOGY | Facility: CLINIC | Age: 59
End: 2024-05-17
Payer: COMMERCIAL

## 2024-05-17 NOTE — TELEPHONE ENCOUNTER
Attempted to call the patient. No answer and the voicemail is full. Will send a message through [employer].    Message from Dr. Ferreira :      There is a significant amount of proteins in the urine.  Please let me know if your blood pressure at home is elevated.     Marly Bravo, [facility]  Adult Endocrinology  [facility], [place]

## 2024-05-25 ENCOUNTER — HEALTH MAINTENANCE LETTER (OUTPATIENT)
Age: 59
End: 2024-05-25

## 2024-06-06 ENCOUNTER — OFFICE VISIT (OUTPATIENT)
Dept: FAMILY MEDICINE | Facility: CLINIC | Age: 59
End: 2024-06-06
Payer: COMMERCIAL

## 2024-06-06 VITALS
OXYGEN SATURATION: 97 % | RESPIRATION RATE: 18 BRPM | HEIGHT: 70 IN | WEIGHT: 280 LBS | HEART RATE: 66 BPM | SYSTOLIC BLOOD PRESSURE: 134 MMHG | TEMPERATURE: 97.9 F | DIASTOLIC BLOOD PRESSURE: 70 MMHG | BODY MASS INDEX: 40.09 KG/M2

## 2024-06-06 DIAGNOSIS — M1A.09X0 IDIOPATHIC CHRONIC GOUT OF MULTIPLE SITES WITHOUT TOPHUS: ICD-10-CM

## 2024-06-06 DIAGNOSIS — Z00.00 ROUTINE GENERAL MEDICAL EXAMINATION AT A HEALTH CARE FACILITY: Primary | ICD-10-CM

## 2024-06-06 DIAGNOSIS — Z79.4 TYPE 2 DIABETES MELLITUS WITH DIABETIC NEPHROPATHY, WITH LONG-TERM CURRENT USE OF INSULIN (H): ICD-10-CM

## 2024-06-06 DIAGNOSIS — E11.21 TYPE 2 DIABETES MELLITUS WITH DIABETIC NEPHROPATHY, WITH LONG-TERM CURRENT USE OF INSULIN (H): ICD-10-CM

## 2024-06-06 DIAGNOSIS — M19.91 PRIMARY OSTEOARTHRITIS, UNSPECIFIED SITE: ICD-10-CM

## 2024-06-06 LAB — URATE SERPL-MCNC: 6.8 MG/DL (ref 3.4–7)

## 2024-06-06 PROCEDURE — 84550 ASSAY OF BLOOD/URIC ACID: CPT | Performed by: FAMILY MEDICINE

## 2024-06-06 PROCEDURE — 90750 HZV VACC RECOMBINANT IM: CPT | Performed by: FAMILY MEDICINE

## 2024-06-06 PROCEDURE — 36415 COLL VENOUS BLD VENIPUNCTURE: CPT | Performed by: FAMILY MEDICINE

## 2024-06-06 PROCEDURE — 99207 PR FOOT EXAM NO CHARGE: CPT | Performed by: FAMILY MEDICINE

## 2024-06-06 PROCEDURE — 99396 PREV VISIT EST AGE 40-64: CPT | Mod: 25 | Performed by: FAMILY MEDICINE

## 2024-06-06 PROCEDURE — 99213 OFFICE O/P EST LOW 20 MIN: CPT | Mod: 25 | Performed by: FAMILY MEDICINE

## 2024-06-06 PROCEDURE — 90471 IMMUNIZATION ADMIN: CPT | Performed by: FAMILY MEDICINE

## 2024-06-06 RX ORDER — MELOXICAM 15 MG/1
15 TABLET ORAL DAILY
Qty: 90 TABLET | Refills: 4 | Status: SHIPPED | OUTPATIENT
Start: 2024-06-06

## 2024-06-06 SDOH — HEALTH STABILITY: PHYSICAL HEALTH: ON AVERAGE, HOW MANY DAYS PER WEEK DO YOU ENGAGE IN MODERATE TO STRENUOUS EXERCISE (LIKE A BRISK WALK)?: 0 DAYS

## 2024-06-06 SDOH — HEALTH STABILITY: PHYSICAL HEALTH: ON AVERAGE, HOW MANY MINUTES DO YOU ENGAGE IN EXERCISE AT THIS LEVEL?: 0 MIN

## 2024-06-06 ASSESSMENT — SOCIAL DETERMINANTS OF HEALTH (SDOH): HOW OFTEN DO YOU GET TOGETHER WITH FRIENDS OR RELATIVES?: THREE TIMES A WEEK

## 2024-06-06 ASSESSMENT — PAIN SCALES - GENERAL: PAINLEVEL: MODERATE PAIN (4)

## 2024-06-06 ASSESSMENT — PATIENT HEALTH QUESTIONNAIRE - PHQ9
SUM OF ALL RESPONSES TO PHQ QUESTIONS 1-9: 3
SUM OF ALL RESPONSES TO PHQ QUESTIONS 1-9: 3
10. IF YOU CHECKED OFF ANY PROBLEMS, HOW DIFFICULT HAVE THESE PROBLEMS MADE IT FOR YOU TO DO YOUR WORK, TAKE CARE OF THINGS AT HOME, OR GET ALONG WITH OTHER PEOPLE: NOT DIFFICULT AT ALL

## 2024-06-06 NOTE — PATIENT INSTRUCTIONS
"Preventive Care Advice   This is general advice we often give to help people stay healthy. Your care team may have specific advice just for you. Please talk to your care team about your own preventive care needs.  Lifestyle  Exercise at least 150 minutes each week (30 minutes a day, 5 days a week).  Do muscle strengthening activities 2 days a week. These help control your weight and prevent disease.  No smoking.  Wear sunscreen to prevent skin cancer.  Have your home tested for radon every 2 to 5 years. Radon is a colorless, odorless gas that can harm your lungs. To learn more, go to www.health.[facility].mn. and search for \"Radon in Homes.\"  Keep guns unloaded and locked up in a safe place like a safe or gun vault, or, use a gun lock and hide the keys. Always lock away bullets separately. To learn more, visit [place].gov and search for \"safe gun storage.\"  Nutrition  Eat 5 or more servings of fruits and vegetables each day.  Try wheat bread, brown rice and whole grain pasta (instead of white bread, rice, and pasta).  Get enough calcium and vitamin D. Check the label on foods and aim for 100% of the RDA (recommended daily allowance).  Regular exams  Have a dental exam and cleaning every 6 months.  See your health care team every year to talk about:  Any changes in your health.  Any medicines your care team has prescribed.  Preventive care, family planning, and ways to prevent chronic diseases.  Shots (vaccines)   HPV shots (up to age 26), if you've never had them before.  Hepatitis B shots (up to age 59), if you've never had them before.  COVID-19 shot: Get this shot when it's due.  Flu shot: Get a flu shot every year.  Tetanus shot: Get a tetanus shot every 10 years.  Pneumococcal, hepatitis A, and RSV shots: Ask your care team if you need these based on your risk.  Shingles shot (for age 50 and up).  General health tests  Diabetes screening:  Starting at age 35, Get screened for diabetes at least every 3 years.  If " you are younger than age 35, ask your care team if you should be screened for diabetes.  Cholesterol test: At age 39, start having a cholesterol test every 5 years, or more often if advised.  Bone density scan (DEXA): At age 50, ask your care team if you should have this scan for osteoporosis (brittle bones).  Hepatitis C: Get tested at least once in your life.  Abdominal aortic aneurysm screening: Talk to your doctor about having this screening if you:  Have ever smoked; and  Are biologically male; and  Are between the ages of 65 and 75.  STIs (sexually transmitted infections)  Before age 24: Ask your care team if you should be screened for STIs.  After age 24: Get screened for STIs if you're at risk. You are at risk for STIs (including HIV) if:  You are sexually active with more than one person.  You don't use condoms every time.  You or a partner was diagnosed with a sexually transmitted infection.  If you are at risk for HIV, ask about PrEP medicine to prevent HIV.  Get tested for HIV at least once in your life, whether you are at risk for HIV or not.  Cancer screening tests  Cervical cancer screening: If you have a cervix, begin getting regular cervical cancer screening tests at age 21. Most people who have regular screenings with normal results can stop after age 65. Talk about this with your provider.  Breast cancer scan (mammogram): If you've ever had breasts, begin having regular mammograms starting at age 40. This is a scan to check for breast cancer.  Colon cancer screening: It is important to start screening for colon cancer at age 45.  Have a colonoscopy test every 10 years (or more often if you're at risk) Or, ask your provider about stool tests like a FIT test every year or Cologuard test every 3 years.  To learn more about your testing options, visit: www.[employer]/032848.pdf.  For help making a decision, visit: tessa/cn32466.  Prostate cancer screening test: If you have a prostate and are age 55  to 69, ask your provider if you would benefit from a yearly prostate cancer screening test.  Lung cancer screening: If you are a current or former smoker age 50 to 80, ask your care team if ongoing lung cancer screenings are right for you.  For informational purposes only. Not to replace the advice of your health care provider. Copyright   2023 [employer]. All rights reserved. Clinically reviewed by the [facility]. [employer] 508652 - REV 04/24.    Preventing Falls: Care Instructions  Injuries and health problems such as trouble walking or poor eyesight can increase your risk of falling. So can some medicines. But there are things you can do to help prevent falls. You can exercise to get stronger. You can also arrange your home to make it safer.    Talk to your doctor about the medicines you take. Ask if any of them increase the risk of falls and whether they can be changed or stopped.   Try to exercise regularly. It can help improve your strength and balance. This can help lower your risk of falling.     Practice fall safety and prevention.    Wear low-heeled shoes that fit well and give your feet good support. Talk to your doctor if you have foot problems that make this hard.  Carry a cellphone or wear a medical alert device that you can use to call for help.  Use stepladders instead of chairs to reach high objects. Don't climb if you're at risk for falls. Ask for help, if needed.  Wear the correct eyeglasses, if you need them.    Make your home safer.    Remove rugs, cords, clutter, and furniture from walkways.  Keep your house well lit. Use night-lights in hallways and bathrooms.  Install and use sturdy handrails on stairways.  Wear nonskid footwear, even inside. Don't walk barefoot or in socks without shoes.    Be safe outside.    Use handrails, curb cuts, and ramps whenever possible.  Keep your hands free by using a shoulder bag or backpack.  Try to walk in well-lit  "areas. Watch out for uneven ground, changes in pavement, and debris.  Be careful in the winter. Walk on the grass or gravel when sidewalks are slippery. Use de-icer on steps and walkways. Add non-slip devices to shoes.    Put grab bars and nonskid mats in your shower or tub and near the toilet. Try to use a shower chair or bath bench when bathing.   Get into a tub or shower by putting in your weaker leg first. Get out with your strong side first. Have a phone or medical alert device in the bathroom with you.   Where can you learn more?  Go to https://www.[employer].net/patiented  Enter G117 in the search box to learn more about \"Preventing Falls: Care Instructions.\"  Current as of: July 17, 2023               Content Version: 14.0    6271-0261 [employer].   Care instructions adapted under license by your healthcare professional. If you have questions about a medical condition or this instruction, always ask your healthcare professional. [employer] disclaims any warranty or liability for your use of this information.      Substance Use Disorder: Care Instructions  Overview     You can improve your life and health by stopping your use of alcohol or drugs. When you don't drink or use drugs, you may feel and sleep better. You may get along better with your family, friends, and coworkers. There are medicines and programs that can help with substance use disorder.  How can you care for yourself at home?  Here are some ways to help you stay sober and prevent relapse.  If you have been given medicine to help keep you sober or reduce your cravings, be sure to take it exactly as prescribed.  Talk to your doctor about programs that can help you stop using drugs or drinking alcohol.  Do not keep alcohol or drugs in your home.  Plan ahead. Think about what you'll say if other people ask you to drink or use drugs. Try not to spend time with people who drink or use drugs.  Use the time and money spent " on drinking or drugs to do something that's important to you.  Preventing a relapse  Have a plan to deal with relapse. Learn to recognize changes in your thinking that lead you to drink or use drugs. Get help before you start to drink or use drugs again.  Try to stay away from situations, friends, or places that may lead you to drink or use drugs.  If you feel the need to drink alcohol or use drugs again, seek help right away. Call a trusted friend or family member. Some people get support from organizations such as Narcotics Anonymous or [employer] or from treatment facilities.  If you relapse, get help as soon as you can. Some people make a plan with another person that outlines what they want that person to do for them if they relapse. The plan usually includes how to handle the relapse and who to notify in case of relapse.  Don't give up. Remember that a relapse doesn't mean that you have failed. Use the experience to learn the triggers that lead you to drink or use drugs. Then quit again. Recovery is a lifelong process. Many people have several relapses before they are able to quit for good.  Follow-up care is a key part of your treatment and safety. Be sure to make and go to all appointments, and call your doctor if you are having problems. It's also a good idea to know your test results and keep a list of the medicines you take.  When should you call for help?   Call 911  anytime you think you may need emergency care. For example, call if you or someone else:    Has overdosed or has withdrawal signs. Be sure to tell the emergency workers that you are or someone else is using or trying to quit using drugs. Overdose or withdrawal signs may include:  Losing consciousness.  Seizure.  Seeing or hearing things that aren't there (hallucinations).     Is thinking or talking about suicide or harming others.   Where to get help 24 hours a day, 7 days a week   If you or someone you know talks about suicide,  "self-harm, a mental health crisis, a substance use crisis, or any other kind of emotional distress, get help right away. You can:    Call the Suicide and Crisis Lifeline at 988.     Call 6-011-392-RJWU (1-717.876.9407).     Text HOME to 285065 to access the Crisis Text Line.   Consider saving these numbers in your phone.  Go to import2 for more information or to chat online.  Call your doctor now or seek immediate medical care if:    You are having withdrawal symptoms. These may include nausea or vomiting, sweating, shakiness, and anxiety.   Watch closely for changes in your health, and be sure to contact your doctor if:    You have a relapse.     You need more help or support to stop.   Where can you learn more?  Go to https://www.[employer].net/patiented  Enter H573 in the search box to learn more about \"Substance Use Disorder: Care Instructions.\"  Current as of: November 15, 2023               Content Version: 14.0    3721-4379 [employer].   Care instructions adapted under license by your healthcare professional. If you have questions about a medical condition or this instruction, always ask your healthcare professional. [employer], [employer] disclaims any warranty or liability for your use of this information.      "

## 2024-06-06 NOTE — NURSING NOTE
Prior to immunization administration, verified patients identity using patient s name and date of birth. Please see Immunization Activity for additional information.     Screening Questionnaire for Adult Immunization    Are you sick today?   No   Do you have allergies to medications, food, a vaccine component or latex?   No   Have you ever had a serious reaction after receiving a vaccination?   No   Do you have a long-term health problem with heart, lung, kidney, or metabolic disease (e.g., diabetes), asthma, a blood disorder, no spleen, complement component deficiency, a cochlear implant, or a spinal fluid leak?  Are you on long-term aspirin therapy?   No   Do you have cancer, leukemia, HIV/AIDS, or any other immune system problem?   No   Do you have a parent, brother, or sister with an immune system problem?   No   In the past 3 months, have you taken medications that affect  your immune system, such as prednisone, other steroids, or anticancer drugs; drugs for the treatment of rheumatoid arthritis, Crohn s disease, or psoriasis; or have you had radiation treatments?   No   Have you had a seizure, or a brain or other nervous system problem?   No   During the past year, have you received a transfusion of blood or blood    products, or been given immune (gamma) globulin or antiviral drug?   No   For women: Are you pregnant or is there a chance you could become       pregnant during the next month?   No   Have you received any vaccinations in the past 4 weeks?   No     Immunization questionnaire answers were all negative.      Patient instructed to remain in clinic for 15 minutes afterwards, and to report any adverse reactions.     Screening performed by Emily Zambrano CMA on 6/6/2024 at 4:28 PM.

## 2024-06-06 NOTE — PROGRESS NOTES
"Preventive Care Visit  [facility]  Scottie Boudreaux MD, Family Medicine  Jun 6, 2024      Assessment & Plan     ASSESSMENT/ORDERS:    ICD-10-CM    1. Routine general medical examination at a health care facility  Z00.00       2. Idiopathic chronic gout of multiple sites without tophus  M1A.09X0 Uric acid     Uric acid      3. Primary osteoarthritis, unspecified site  M19.91 meloxicam (MOBIC) 15 MG tablet      4. Type 2 diabetes mellitus with diabetic nephropathy, with long-term current use of insulin (H)  E11.21 FOOT EXAM    Z79.4         PLAN:   Continue to follow-up with endocrine for diabetes.   Medications refilled for all other above noted stable conditions.  Labs ordered as noted above.             BMI  Estimated body mass index is 39.64 kg/m  as calculated from the following:    Height as of this encounter: 1.79 m (5' 10.47\").    Weight as of this encounter: 127 kg (280 lb).       Counseling  Appropriate preventive services were discussed with this patient, including applicable screening as appropriate for fall prevention, nutrition, physical activity, Tobacco-use cessation, weight loss and cognition.  Checklist reviewing preventive services available has been given to the patient.          Iglesia Motta is a 59 year old, presenting for the following:  Physical        6/6/2024     3:26 PM   Additional Questions   Roomed by Emily HOGAN        Health Care Directive  Patient does not have a Health Care Directive or Living Will: Discussed advance care planning with patient; however, patient declined at this time.    HPI  Follows with endocrine for diabetes.  No concerns.  Tolerating allopurinol for gout.            6/6/2024   General Health   How would you rate your overall physical health? (!) FAIR   Feel stress (tense, anxious, or unable to sleep) Not at all         6/6/2024   Nutrition   Three or more servings of calcium each day? Yes   Diet: Low salt    Low " fat/cholesterol    Diabetic   How many servings of fruit and vegetables per day? (!) 2-3   How many sweetened beverages each day? 0-1         6/6/2024   Exercise   Days per week of moderate/strenous exercise 0 days   Average minutes spent exercising at this level 0 min   (!) EXERCISE CONCERN      6/6/2024   Social Factors   Frequency of gathering with friends or relatives Three times a week   Worry food won't last until get money to buy more No   Food not last or not have enough money for food? No   Do you have housing?  Yes   Are you worried about losing your housing? No   Lack of transportation? No   Unable to get utilities (heat,electricity)? No         6/6/2024   Fall Risk   Fallen 2 or more times in the past year? Yes   Trouble with walking or balance? Yes   Reason Gait Speed Test Not Completed Patient declines             6/6/2024   Dental   Dentist two times every year? Yes         6/6/2024   TB Screening   Were you born outside of the [place]? No       Today's PHQ-9 Score:       6/6/2024     3:26 PM   PHQ-9 SCORE   PHQ-9 Total Score MyChart 3 (Minimal depression)   PHQ-9 Total Score 3         6/6/2024   Substance Use   If I could quit smoking, I would Neutral   I want to quit somking, worry about health affects Neutral   Willing to make a plan to quit smoking Neutral   Willing to cut down before quitting Neutral   Alcohol more than 3/day or more than 7/wk No   Do you use any other substances recreationally? (!) ALCOHOL     Social History     Tobacco Use    Smoking status: Every Day     Current packs/day: 0.50     Average packs/day: 0.5 packs/day for 50.0 years (25.0 ttl pk-yrs)     Types: Cigarettes    Smokeless tobacco: Former     Types: Chew     Quit date: 12/31/2009   Vaping Use    Vaping status: Never Used   Substance Use Topics    Alcohol use: Yes     Alcohol/week: 1.7 standard drinks of alcohol    Drug use: No           6/6/2024   STI Screening   New sexual partner(s) since last STI/HIV test? No   Last PSA:  "  PSA   Date Value Ref Range Status   05/02/2008 0.57 0 - 4 ug/L Final     ASCVD Risk   The 10-year ASCVD risk score (Curtis GRACIA, et al., 2019) is: 42.3%    Values used to calculate the score:      Age: 59 years      Sex: Male      Is Non- : No      Diabetic: Yes      Tobacco smoker: Yes      Systolic Blood Pressure: 134 mmHg      Is BP treated: Yes      HDL Cholesterol: 35 mg/dL      Total Cholesterol: 226 mg/dL           Reviewed and updated as needed this visit by Provider   Tobacco  Allergies  Meds  Problems  Med Hx  Surg Hx  Fam Hx                     Objective    Exam  /70   Pulse 66   Temp 97.9  F (36.6  C) (Temporal)   Resp 18   Ht 1.79 m (5' 10.47\")   Wt 127 kg (280 lb)   SpO2 97%   BMI 39.64 kg/m     Estimated body mass index is 39.64 kg/m  as calculated from the following:    Height as of this encounter: 1.79 m (5' 10.47\").    Weight as of this encounter: 127 kg (280 lb).    Physical Exam  Constitutional:       General: He is not in acute distress.     Appearance: He is well-developed.   HENT:      Head: Normocephalic and atraumatic.      Right Ear: Hearing, tympanic membrane, ear canal and external ear normal.      Left Ear: Hearing, tympanic membrane, ear canal and external ear normal.      Nose: Nose normal.      Mouth/Throat:      Mouth: No oral lesions.      Pharynx: Uvula midline. No oropharyngeal exudate.   Eyes:      General: Lids are normal. No scleral icterus.        Right eye: No discharge.         Left eye: No discharge.      Extraocular Movements: Extraocular movements intact.      Conjunctiva/sclera: Conjunctivae normal.      Pupils: Pupils are equal, round, and reactive to light.   Neck:      Thyroid: No thyroid mass or thyromegaly.      Trachea: No tracheal deviation.   Cardiovascular:      Rate and Rhythm: Normal rate and regular rhythm.      Pulses: Normal pulses.      Heart sounds: Normal heart sounds, S1 normal and S2 normal. No " murmur heard.     No S3 or S4 sounds.   Pulmonary:      Effort: Pulmonary effort is normal. No respiratory distress.      Breath sounds: Normal breath sounds. No wheezing, rhonchi or rales.   Abdominal:      General: Bowel sounds are normal. There is no distension.      Palpations: Abdomen is soft. There is no mass.      Tenderness: There is no abdominal tenderness. There is no guarding.   Musculoskeletal:         General: No deformity. Normal range of motion.      Cervical back: Normal range of motion and neck supple.      Comments: FEET:  monofilament exam with no sensation bilaterally.  Good hair growth.  Dorsalis pedis pulses 2+ bilaterally.  Feet warm.    Feet:      Right foot:      Skin integrity: No ulcer, blister, skin breakdown or erythema.      Left foot:      Skin integrity: No ulcer, blister, skin breakdown or erythema.   Lymphadenopathy:      Cervical: No cervical adenopathy.      Upper Body:      Right upper body: No supraclavicular adenopathy.      Left upper body: No supraclavicular adenopathy.   Skin:     General: Skin is warm and dry.      Findings: No lesion or rash.   Neurological:      Mental Status: He is alert and oriented to person, place, and time.      Motor: No abnormal muscle tone.      Deep Tendon Reflexes: Reflexes are normal and symmetric.   Psychiatric:         Speech: Speech normal.         Behavior: Behavior is cooperative.         Thought Content: Thought content normal.         Judgment: Judgment normal.               Signed Electronically by: Scottie Boudreaux MD    Answers submitted by the patient for this visit:  Patient Health Questionnaire (Submitted on 6/6/2024)  If you checked off any problems, how difficult have these problems made it for you to do your work, take care of things at home, or get along with other people?: Not difficult at all  PHQ9 TOTAL SCORE: 3

## 2024-07-10 DIAGNOSIS — M1A.09X0 IDIOPATHIC CHRONIC GOUT OF MULTIPLE SITES WITHOUT TOPHUS: ICD-10-CM

## 2024-07-11 DIAGNOSIS — E11.43 TYPE 2 DIABETES MELLITUS WITH DIABETIC AUTONOMIC NEUROPATHY, WITH LONG-TERM CURRENT USE OF INSULIN (H): ICD-10-CM

## 2024-07-11 DIAGNOSIS — Z79.4 TYPE 2 DIABETES MELLITUS WITH DIABETIC AUTONOMIC NEUROPATHY, WITH LONG-TERM CURRENT USE OF INSULIN (H): ICD-10-CM

## 2024-07-11 RX ORDER — ALLOPURINOL 300 MG/1
1 TABLET ORAL DAILY
Qty: 90 TABLET | Refills: 3 | Status: SHIPPED | OUTPATIENT
Start: 2024-07-11

## 2024-07-11 RX ORDER — EPLERENONE 25 MG/1
25 TABLET, FILM COATED ORAL DAILY
Qty: 90 TABLET | Refills: 1 | Status: SHIPPED | OUTPATIENT
Start: 2024-07-11

## 2024-07-11 RX ORDER — PEN NEEDLE, DIABETIC 31 GX5/16"
NEEDLE, DISPOSABLE MISCELLANEOUS
Qty: 200 EACH | Refills: 2 | Status: SHIPPED | OUTPATIENT
Start: 2024-07-11

## 2024-09-11 DIAGNOSIS — E11.21 TYPE 2 DIABETES MELLITUS WITH DIABETIC NEPHROPATHY, WITH LONG-TERM CURRENT USE OF INSULIN (H): ICD-10-CM

## 2024-09-11 DIAGNOSIS — Z79.4 TYPE 2 DIABETES MELLITUS WITH DIABETIC NEPHROPATHY, WITH LONG-TERM CURRENT USE OF INSULIN (H): ICD-10-CM

## 2024-09-18 RX ORDER — PROCHLORPERAZINE 25 MG/1
SUPPOSITORY RECTAL
Qty: 1 EACH | Refills: 3 | Status: SHIPPED | OUTPATIENT
Start: 2024-09-18

## 2024-09-18 NOTE — TELEPHONE ENCOUNTER
Medication Requested:  Continuous Blood Gluc Transmit (DEXCOM G6 TRANSMITTER) MISC 1 each 3 9/15/2023 -- No   Sig: CHANGE EVERY 3 MONTHS     ----------------------  Last Office Visit : 5/1/2024  [facility]      Future Office visit:     11/13/2024 9:10 AM (50 min)  [facility]   RETURN DIABETES   [facility] ([facility])   Gin Ferreira MD   [facility] N[facility]     ----------------------      Refill decision: Medication refilled per protocol.

## 2024-09-25 DIAGNOSIS — E11.21 TYPE 2 DIABETES MELLITUS WITH DIABETIC NEPHROPATHY, WITH LONG-TERM CURRENT USE OF INSULIN (H): ICD-10-CM

## 2024-09-25 DIAGNOSIS — Z79.4 TYPE 2 DIABETES MELLITUS WITH DIABETIC NEPHROPATHY, WITH LONG-TERM CURRENT USE OF INSULIN (H): ICD-10-CM

## 2024-09-26 RX ORDER — PROCHLORPERAZINE 25 MG/1
SUPPOSITORY RECTAL
Qty: 9 EACH | Refills: 2 | Status: SHIPPED | OUTPATIENT
Start: 2024-09-26

## 2024-09-27 NOTE — TELEPHONE ENCOUNTER
LVD:  5/1/2024  [facility] Gin Dacosta MD  Endocrinology, Diabetes, and Metabolism     Refilled per protocol.

## 2024-10-21 DIAGNOSIS — E11.21 TYPE 2 DIABETES MELLITUS WITH DIABETIC NEPHROPATHY, WITH LONG-TERM CURRENT USE OF INSULIN (H): ICD-10-CM

## 2024-10-21 DIAGNOSIS — E11.43 TYPE 2 DIABETES MELLITUS WITH DIABETIC AUTONOMIC NEUROPATHY, WITH LONG-TERM CURRENT USE OF INSULIN (H): Primary | ICD-10-CM

## 2024-10-21 DIAGNOSIS — Z79.4 TYPE 2 DIABETES MELLITUS WITH DIABETIC NEPHROPATHY, WITH LONG-TERM CURRENT USE OF INSULIN (H): ICD-10-CM

## 2024-10-21 DIAGNOSIS — Z79.4 TYPE 2 DIABETES MELLITUS WITH DIABETIC AUTONOMIC NEUROPATHY, WITH LONG-TERM CURRENT USE OF INSULIN (H): Primary | ICD-10-CM

## 2024-10-25 RX ORDER — EMPAGLIFLOZIN 25 MG/1
25 TABLET, FILM COATED ORAL DAILY
Qty: 90 TABLET | Refills: 0 | Status: SHIPPED | OUTPATIENT
Start: 2024-10-25 | End: 2024-11-13

## 2024-10-25 RX ORDER — ATORVASTATIN CALCIUM 80 MG/1
80 TABLET, FILM COATED ORAL DAILY
Qty: 90 TABLET | Refills: 0 | Status: SHIPPED | OUTPATIENT
Start: 2024-10-25

## 2024-11-04 DIAGNOSIS — I10 ESSENTIAL HYPERTENSION WITH GOAL BLOOD PRESSURE LESS THAN 140/90: Primary | ICD-10-CM

## 2024-11-07 RX ORDER — CHLORTHALIDONE 25 MG/1
25 TABLET ORAL DAILY
Qty: 90 TABLET | Refills: 0 | Status: SHIPPED | OUTPATIENT
Start: 2024-11-07 | End: 2024-11-13

## 2024-11-13 ENCOUNTER — OFFICE VISIT (OUTPATIENT)
Dept: ENDOCRINOLOGY | Facility: CLINIC | Age: 59
End: 2024-11-13
Payer: COMMERCIAL

## 2024-11-13 VITALS
RESPIRATION RATE: 14 BRPM | BODY MASS INDEX: 40.49 KG/M2 | DIASTOLIC BLOOD PRESSURE: 75 MMHG | OXYGEN SATURATION: 96 % | WEIGHT: 286 LBS | SYSTOLIC BLOOD PRESSURE: 120 MMHG | HEART RATE: 69 BPM

## 2024-11-13 DIAGNOSIS — E11.21 TYPE 2 DIABETES MELLITUS WITH DIABETIC NEPHROPATHY, WITH LONG-TERM CURRENT USE OF INSULIN (H): Primary | ICD-10-CM

## 2024-11-13 DIAGNOSIS — E66.813 CLASS 3 OBESITY: ICD-10-CM

## 2024-11-13 DIAGNOSIS — E04.2 MULTIPLE THYROID NODULES: ICD-10-CM

## 2024-11-13 DIAGNOSIS — Z79.4 TYPE 2 DIABETES MELLITUS WITH DIABETIC NEPHROPATHY, WITH LONG-TERM CURRENT USE OF INSULIN (H): Primary | ICD-10-CM

## 2024-11-13 DIAGNOSIS — I10 ESSENTIAL HYPERTENSION: ICD-10-CM

## 2024-11-13 DIAGNOSIS — I10 ESSENTIAL HYPERTENSION WITH GOAL BLOOD PRESSURE LESS THAN 140/90: ICD-10-CM

## 2024-11-13 LAB
ALBUMIN SERPL BCG-MCNC: 4.2 G/DL (ref 3.5–5.2)
ALP SERPL-CCNC: 121 U/L (ref 40–150)
ALT SERPL W P-5'-P-CCNC: 42 U/L (ref 0–70)
ANION GAP SERPL CALCULATED.3IONS-SCNC: 14 MMOL/L (ref 7–15)
AST SERPL W P-5'-P-CCNC: 42 U/L (ref 0–45)
BILIRUB SERPL-MCNC: 0.3 MG/DL
BUN SERPL-MCNC: 37.1 MG/DL (ref 8–23)
CALCIUM SERPL-MCNC: 9.8 MG/DL (ref 8.8–10.4)
CHLORIDE SERPL-SCNC: 101 MMOL/L (ref 98–107)
CREAT SERPL-MCNC: 1.36 MG/DL (ref 0.67–1.17)
EGFRCR SERPLBLD CKD-EPI 2021: 59 ML/MIN/1.73M2
EST. AVERAGE GLUCOSE BLD GHB EST-MCNC: 163 MG/DL
GLUCOSE SERPL-MCNC: 97 MG/DL (ref 70–99)
HBA1C MFR BLD: 7.3 %
HCO3 SERPL-SCNC: 26 MMOL/L (ref 22–29)
POTASSIUM SERPL-SCNC: 3.8 MMOL/L (ref 3.4–5.3)
PROT SERPL-MCNC: 7.4 G/DL (ref 6.4–8.3)
SODIUM SERPL-SCNC: 141 MMOL/L (ref 135–145)

## 2024-11-13 RX ORDER — INSULIN DEGLUDEC 200 U/ML
50 INJECTION, SOLUTION SUBCUTANEOUS DAILY
Qty: 23 ML | Refills: 3 | Status: SHIPPED | OUTPATIENT
Start: 2024-11-13

## 2024-11-13 RX ORDER — LISINOPRIL 20 MG/1
20 TABLET ORAL 2 TIMES DAILY
Qty: 180 TABLET | Refills: 3 | Status: SHIPPED | OUTPATIENT
Start: 2024-11-13

## 2024-11-13 RX ORDER — AMLODIPINE BESYLATE 10 MG/1
10 TABLET ORAL DAILY
Qty: 90 TABLET | Refills: 3 | Status: SHIPPED | OUTPATIENT
Start: 2024-11-13

## 2024-11-13 RX ORDER — INSULIN ASPART 100 [IU]/ML
INJECTION, SOLUTION INTRAVENOUS; SUBCUTANEOUS
Qty: 30 ML | Refills: 3 | Status: SHIPPED | OUTPATIENT
Start: 2024-11-13

## 2024-11-13 RX ORDER — CHLORTHALIDONE 25 MG/1
25 TABLET ORAL DAILY
Qty: 90 TABLET | Refills: 3 | Status: SHIPPED | OUTPATIENT
Start: 2024-11-13

## 2024-11-13 NOTE — PATIENT INSTRUCTIONS
Welcome to the [facility]     Our Endocrinology Clinics are here to provide you with a team-based, collaborative approach in the diagnosis and treatment of patients with diabetes and endocrine disorders. The team is made up of Physicians, Physician Assistants, Certified Diabetes Educators, Registered Nurses, Medical Assistants, Emergency Medical Technicians, and many others, all of whom have the unified goal of providing our patients with high quality care.     Please see below for some helpful tips to best navigate and use the [facility]:     [place] Respect: At [facility], we are committed to a respectful and safe space for all patients, visitors, and staff.  We believe that mutual respect between patients and their care team is the foundation of quality care.  It is our expectation that you will be treated with respect by your care team.  In turn, we ask that all communication with the care team (written and verbal) be respectful and free from profanity, threatening, or abusive language.  Disrespectful communication undermines our therapeutic relationship with you and may result in us being unable to continue to provide your care.    Refills: A provider must see you at least annually to prescribe and refill medications. This is to ensure your safety as well as meet insurance and compliance regulations.    Scheduling: Many of our Providers offer both in-person or video visits. Please call to schedule any needed follow ups as soon as possible because our provider schedules fill up very quickly. Our care team has the right to require an in-person visit when they believe that it is medically necessary. Please remember that for any virtual visits, you must be in the [facility] at the time of the visit, otherwise we are unable to see you and you will need to be rescheduled.    Missed Appointments: If you need to cancel or miss your  scheduled appointment, please call the clinic at [phone] to reschedule.  Please note if you repeatedly miss appointments or repeatedly miss appointments without calling to inform us ahead of time (no-show), the clinic may elect to not allow you to reschedule without speaking to a manager, may require a Partnership In Care Agreement prior to rescheduling, or could result in you no longer being able to receive care from the clinic. Providing the clinic with timely notification if you have to miss an appointment, allows us to better serve the needs of all of our patients.    Primary Care Provider: Our Endocrinologists are Specialists in their field. We expect you to have a Primary Care Provider established to handle any needs outside of your diabetes and endocrine care.  We would be happy to assist you find a Primary Care Provider, if you do not have one.    [employer]: [employer] is a wonderful resource that allows you access to your Care Team via online or the misha. Please ask a member of the team if you would like help creating an account. Please note that it may take up to 2 business days for a response. [employer] messages are not reviewed on weekends or after business hours.  Emergent or urgent care needs should never be communicated via [employer].  If you experience a medical emergency call 911 or go to the nearest emergency room.    Labs: It is recommended that you stay within the [facility] for labs but you are welcome to obtain ordered labs (with some exceptions) from any location of your choice as long as they are able to complete and process the needed labs. If you need us to fax orders to your preferred lab, please provide us the name and fax number of the lab you would like to go to so we can fax the orders. If your labs are drawn outside of the [facility], please have them fax the results to [phone] ([place]) or [phone] ([place]) or via [facility]. It is your  responsibility to ensure that outside lab results are sent to us.    We look forward to working with you. Please do not hesitate to reach out with any questions.    Thank you,    The Endocrine Team    [facility] Address:   [place] Address:     [address] [place] [address]    Phone: [phone]  Fax: [phone] [address] [place] [address]    Phone: [phone]  Fax: [phone]     [facility] Cost Estimate Phone Number: [phone]    General Lab and Imaging Scheduling Phone Number: [phone]
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
## 2024-11-17 NOTE — RESULT ENCOUNTER NOTE
The kidney function on the recent labs came back a little lower compared with the prior results.  Please try to maintain a good hydration by drinking approximately 2 L of liquids daily.  We are going to recheck the kidney function at your next appointment.

## 2024-11-28 DIAGNOSIS — Z79.4 TYPE 2 DIABETES MELLITUS WITH DIABETIC NEPHROPATHY, WITH LONG-TERM CURRENT USE OF INSULIN (H): Primary | ICD-10-CM

## 2024-11-28 DIAGNOSIS — E11.21 TYPE 2 DIABETES MELLITUS WITH DIABETIC NEPHROPATHY, WITH LONG-TERM CURRENT USE OF INSULIN (H): Primary | ICD-10-CM

## 2024-12-03 RX ORDER — METFORMIN HYDROCHLORIDE 500 MG/1
2000 TABLET, EXTENDED RELEASE ORAL
Qty: 360 TABLET | Refills: 3 | Status: SHIPPED | OUTPATIENT
Start: 2024-12-03

## 2024-12-03 NOTE — TELEPHONE ENCOUNTER
metFORMIN (GLUCOPHAGE XR) 500 MG 24 hr tablet 360 tablet 3 10/25/2023     Last Office Visit: 11/13/24  Future Office visit:   7/2/25    Biguanide Agents [id number]/28/2024 12:35 AM   Protocol Details Medication indicated for associated diagnosis    Has GFR on file in past 12 months and most recent value is normal        Component      Latest Ref Rng 11/13/2024  10:25 AM   Sodium      135 - 145 mmol/L 141    Potassium      3.4 - 5.3 mmol/L 3.8    Carbon Dioxide (CO2)      22 - 29 mmol/L 26    Anion Gap      7 - 15 mmol/L 14    Urea Nitrogen      8.0 - 23.0 mg/dL 37.1 (H)    Creatinine      0.67 - 1.17 mg/dL 1.36 (H)    GFR Estimate      >60 mL/min/1.73m2 59 (L)        Routing refill request to provider for review/approval because:  Diagnosis does not match medication indication, cannot fill per protocol.   Abnormal lab    Seema Balderas RN  [facility] Central Nursing/Red Flag Triage & Med Refill Team

## 2024-12-16 DIAGNOSIS — E26.9 HYPERALDOSTERONISM (H): ICD-10-CM

## 2024-12-19 RX ORDER — EPLERENONE 25 MG/1
25 TABLET, FILM COATED ORAL DAILY
Qty: 90 TABLET | Refills: 1 | Status: SHIPPED | OUTPATIENT
Start: 2024-12-19

## 2024-12-19 NOTE — TELEPHONE ENCOUNTER
eplerenone (INSPRA) 25 MG tablet 90 tablet 1 7/11/2024       Last Office Visit: 11/13/24  Future Office visit:   7/2/25    Diuretics (Including Combos) Protocol [id number]/16/2024 01:27 AM   Protocol Details Medication indicated for associated diagnosis    Has GFR on file in past 12 months and most recent value is normal     Component      Latest Ref Rng 11/13/2024  10:25 AM   Sodium      135 - 145 mmol/L 141    Potassium      3.4 - 5.3 mmol/L 3.8    Carbon Dioxide (CO2)      22 - 29 mmol/L 26    Anion Gap      7 - 15 mmol/L 14    Urea Nitrogen      8.0 - 23.0 mg/dL 37.1 (H)    Creatinine      0.67 - 1.17 mg/dL 1.36 (H)    GFR Estimate      >60 mL/min/1.73m2 59 (L)      Routing refill request to provider for review/approval because:  Diagnosis does not match medication indication, cannot fill per protocol.   Abnormal lab    Seema Balderas RN  [facility] Central Nursing/Red Flag Triage & Med Refill Team

## 2025-01-08 NOTE — PROGRESS NOTES
Outcome for 10/24/23 8:55 AM: Gifi message sent requesting blood sugar readings.  Marly Bravo CMA  Adult Endocrinology  [facility], [place]     mentation, falling, weakness, bleeding. Severe pain or pain not relieved by medications.  Or, any other signs or symptoms that you may have questions about.    DISPOSITION:home    Home With:   OT  PT  HH  RN       Long term SNF/Inpatient Rehab    Independent/assisted living    Hospice    Other:       PATIENT CONDITION AT DISCHARGE:     Functional status    Poor     Deconditioned    x Independent      Cognition    x Lucid     Forgetful     Dementia      Catheters/lines (plus indication)    Henriquez     PICC     PEG    x None      Code status    x Full code     DNR      PHYSICAL EXAMINATION AT DISCHARGE:  General:          Alert, cooperative, no distress, appears stated age.     HEENT:           Atraumatic, anicteric sclerae, pink conjunctivae                          No oral ulcers, mucosa moist, throat clear, dentition fair  Neck:               Supple, symmetrical  Lungs:             Clear to auscultation bilaterally.  No Wheezing or Rhonchi. No rales.  Chest wall:      No tenderness  No Accessory muscle use.  Heart:              Regular  rhythm,  No  murmur   No edema  Abdomen:        Soft, non-tender. Not distended.  Bowel sounds normal  Extremities:     No cyanosis.  No clubbing,                            Skin turgor normal, Capillary refill normal  Skin:                Not pale.  Not Jaundiced  No rashes   Psych:             Not anxious or agitated.  Neurologic:      Alert, moves all extremities, answers questions appropriately and responds to commands   Picc line in R arm      CHRONIC MEDICAL DIAGNOSES:      Greater than 35 minutes were spent with the patient on counseling and coordination of care    Signed:   Edgar Gaspar MD  1/8/2025  11:52 AM

## 2025-01-20 DIAGNOSIS — Z79.4 TYPE 2 DIABETES MELLITUS WITH DIABETIC AUTONOMIC NEUROPATHY, WITH LONG-TERM CURRENT USE OF INSULIN (H): Primary | ICD-10-CM

## 2025-01-20 DIAGNOSIS — E11.43 TYPE 2 DIABETES MELLITUS WITH DIABETIC AUTONOMIC NEUROPATHY, WITH LONG-TERM CURRENT USE OF INSULIN (H): Primary | ICD-10-CM

## 2025-01-27 ENCOUNTER — MYC MEDICAL ADVICE (OUTPATIENT)
Dept: ENDOCRINOLOGY | Facility: CLINIC | Age: 60
End: 2025-01-27
Payer: COMMERCIAL

## 2025-01-27 DIAGNOSIS — E66.813 CLASS 3 OBESITY: ICD-10-CM

## 2025-01-27 DIAGNOSIS — Z79.4 TYPE 2 DIABETES MELLITUS WITH DIABETIC NEPHROPATHY, WITH LONG-TERM CURRENT USE OF INSULIN (H): ICD-10-CM

## 2025-01-27 DIAGNOSIS — E11.21 TYPE 2 DIABETES MELLITUS WITH DIABETIC NEPHROPATHY, WITH LONG-TERM CURRENT USE OF INSULIN (H): ICD-10-CM

## 2025-01-27 RX ORDER — ATORVASTATIN CALCIUM 80 MG/1
80 TABLET, FILM COATED ORAL DAILY
Qty: 90 TABLET | Refills: 3 | Status: SHIPPED | OUTPATIENT
Start: 2025-01-27

## 2025-01-30 ENCOUNTER — MYC REFILL (OUTPATIENT)
Dept: ENDOCRINOLOGY | Facility: CLINIC | Age: 60
End: 2025-01-30
Payer: COMMERCIAL

## 2025-01-30 DIAGNOSIS — E11.43 TYPE 2 DIABETES MELLITUS WITH DIABETIC AUTONOMIC NEUROPATHY, WITH LONG-TERM CURRENT USE OF INSULIN (H): ICD-10-CM

## 2025-01-30 DIAGNOSIS — Z79.4 TYPE 2 DIABETES MELLITUS WITH DIABETIC AUTONOMIC NEUROPATHY, WITH LONG-TERM CURRENT USE OF INSULIN (H): ICD-10-CM

## 2025-01-30 RX ORDER — PEN NEEDLE, DIABETIC 31 GX5/16"
NEEDLE, DISPOSABLE MISCELLANEOUS
Qty: 200 EACH | Refills: 2 | Status: SHIPPED | OUTPATIENT
Start: 2025-01-30

## 2025-02-06 ENCOUNTER — MYC REFILL (OUTPATIENT)
Dept: FAMILY MEDICINE | Facility: CLINIC | Age: 60
End: 2025-02-06
Payer: COMMERCIAL

## 2025-02-06 DIAGNOSIS — M19.91 PRIMARY OSTEOARTHRITIS, UNSPECIFIED SITE: ICD-10-CM

## 2025-02-07 ENCOUNTER — TRANSFERRED RECORDS (OUTPATIENT)
Dept: HEALTH INFORMATION MANAGEMENT | Facility: CLINIC | Age: 60
End: 2025-02-07
Payer: COMMERCIAL

## 2025-02-07 LAB — RETINOPATHY: NEGATIVE

## 2025-02-10 RX ORDER — MELOXICAM 15 MG/1
15 TABLET ORAL DAILY
Qty: 90 TABLET | Refills: 4 | OUTPATIENT
Start: 2025-02-10

## 2025-03-26 NOTE — TELEPHONE ENCOUNTER
Left message for patient to call back and speak with any .    Thank you,  Janis Messer   for [facility]       cath lab

## 2025-05-04 ENCOUNTER — MYC MEDICAL ADVICE (OUTPATIENT)
Dept: ENDOCRINOLOGY | Facility: CLINIC | Age: 60
End: 2025-05-04
Payer: COMMERCIAL

## 2025-05-04 DIAGNOSIS — E11.43 TYPE 2 DIABETES MELLITUS WITH DIABETIC AUTONOMIC NEUROPATHY, WITH LONG-TERM CURRENT USE OF INSULIN (H): Primary | ICD-10-CM

## 2025-05-04 DIAGNOSIS — Z79.4 TYPE 2 DIABETES MELLITUS WITH DIABETIC AUTONOMIC NEUROPATHY, WITH LONG-TERM CURRENT USE OF INSULIN (H): Primary | ICD-10-CM

## 2025-05-05 RX ORDER — ACYCLOVIR 400 MG/1
TABLET ORAL
Qty: 3 EACH | Refills: 5 | Status: SHIPPED | OUTPATIENT
Start: 2025-05-05

## 2025-05-05 RX ORDER — ACYCLOVIR 400 MG/1
TABLET ORAL
Qty: 1 EACH | Refills: 0 | Status: SHIPPED | OUTPATIENT
Start: 2025-05-05

## 2025-05-08 ENCOUNTER — TELEPHONE (OUTPATIENT)
Dept: FAMILY MEDICINE | Facility: CLINIC | Age: 60
End: 2025-05-08
Payer: COMMERCIAL

## 2025-05-08 NOTE — TELEPHONE ENCOUNTER
Patient Quality Outreach    Patient is due for the following:   Diabetes -  A1C  Colon Cancer Screening  Depression  -  phq2    Action(s) Taken:   Patient has upcoming appointment, these items will be addressed at that time.    Type of outreach:    Chart review performed, no outreach needed.    Questions for provider review:    None         Serenity Núñez  Chart routed to None.

## 2025-05-24 ENCOUNTER — HEALTH MAINTENANCE LETTER (OUTPATIENT)
Age: 60
End: 2025-05-24

## 2025-06-17 DIAGNOSIS — E26.9 HYPERALDOSTERONISM: ICD-10-CM

## 2025-06-19 RX ORDER — EPLERENONE 25 MG/1
25 TABLET ORAL DAILY
Qty: 30 TABLET | Refills: 0 | Status: SHIPPED | OUTPATIENT
Start: 2025-06-19

## 2025-06-19 NOTE — TELEPHONE ENCOUNTER
Last Written Prescription:  eplerenone (INSPRA) 25 MG tablet 90 tablet 1 12/19/2024 -- No   Sig - Route: TAKE 1 TABLET DAILY - Oral     ----------------------  Last Visit Date: 11-13-24  Future Visit Date: 7-2-25  ----------------------  ABN GFR: reviewed by provider, recheck at [facility]    Refill decision: Medication refilled per  Medication Refill in Ambulatory Care  policy. Endocrine protocol      Previous drug override  Warnings Override History for eplerenone (INSPRA) 25 MG tablet [[id number]]    Overridden by Gin Ferreira MD on Dec 19, 2024 6:00 PM   Drug-Drug   1. EPLERENONE / ANGIOTENSIN ANTAGONISTS [Level: Major]   Other Orders: lisinopril (ZESTRIL) 20 MG tablet          Request from pharmacy:  Requested Prescriptions   Pending Prescriptions Disp Refills    eplerenone (INSPRA) 25 MG tablet [Pharmacy Med Name: EPLERENONE TABS 25MG] 90 tablet 3     Sig: TAKE 1 TABLET DAILY       Diuretics (Including Combos) Protocol Failed - 6/19/2025  1:08 PM        Failed - Medication indicated for associated diagnosis     Medication is associated with one or more of the following diagnoses:     Edema   Hypertension   Heart Failure   Meniere's Disease   Bilateral localized swelling of lower limbs   Pulmonary Hypertension          Failed - Has GFR on file in past 12 months and most recent value is normal        Passed - Most recent blood pressure under 140/90 in past 12 months     BP Readings from Last 3 Encounters:   11/13/24 120/75   06/06/24 134/70   05/15/24 (!) 152/83       No data recorded            Passed - Potassium level on file in past 12 months        Passed - Medication is active on med list and the sig matches. RN to manually verify dose and sig if red X/fail.     If the protocol passes (green check), you do not need to verify med dose and sig.    A prescription matches if they are the same clinical intention.    For Example: once daily and every morning are the same.    The protocol can not  identify upper and lower case letters as matching and will fail.     For Example: Take 1 tablet (50 mg) by mouth daily     TAKE 1 TABLET (50 MG) BY MOUTH DAILY    For all fails (red x), verify dose and sig.    If the refill does match what is on file, the RN can still proceed to approve the refill request.       If they do not match, route to the appropriate provider.             Passed - Recent (12 month) or future (90 days) visit with authorizing provider's specialty (provided they have been seen in the past 15 months)     The patient must have completed an in-person or virtual visit within the past 12 months or has a future visit scheduled within the next 90 days with the authorizing provider s specialty.  Urgent care and e-visits do not qualify as an office visit for this protocol.          Passed - Patient is age 18 or older

## 2025-06-20 PROBLEM — E66.01 MORBID OBESITY (H): Status: ACTIVE | Noted: 2025-06-20

## 2025-06-20 PROBLEM — R01.1 SYSTOLIC MURMUR: Status: ACTIVE | Noted: 2025-06-20

## 2025-06-20 PROBLEM — M17.12 PRIMARY OSTEOARTHRITIS OF LEFT KNEE: Status: RESOLVED | Noted: 2024-05-06 | Resolved: 2025-06-20

## 2025-06-23 ENCOUNTER — PATIENT OUTREACH (OUTPATIENT)
Dept: CARE COORDINATION | Facility: CLINIC | Age: 60
End: 2025-06-23
Payer: COMMERCIAL

## 2025-06-25 ENCOUNTER — PATIENT OUTREACH (OUTPATIENT)
Dept: CARE COORDINATION | Facility: CLINIC | Age: 60
End: 2025-06-25
Payer: COMMERCIAL

## 2025-07-02 ENCOUNTER — LAB (OUTPATIENT)
Dept: LAB | Facility: CLINIC | Age: 60
End: 2025-07-02
Payer: COMMERCIAL

## 2025-07-02 ENCOUNTER — RESULTS FOLLOW-UP (OUTPATIENT)
Dept: ENDOCRINOLOGY | Facility: CLINIC | Age: 60
End: 2025-07-02

## 2025-07-02 ENCOUNTER — OFFICE VISIT (OUTPATIENT)
Dept: ENDOCRINOLOGY | Facility: CLINIC | Age: 60
End: 2025-07-02
Payer: COMMERCIAL

## 2025-07-02 ENCOUNTER — HOSPITAL ENCOUNTER (OUTPATIENT)
Dept: CT IMAGING | Facility: CLINIC | Age: 60
Discharge: HOME | End: 2025-07-02
Attending: FAMILY MEDICINE
Payer: COMMERCIAL

## 2025-07-02 VITALS
DIASTOLIC BLOOD PRESSURE: 72 MMHG | HEART RATE: 76 BPM | SYSTOLIC BLOOD PRESSURE: 151 MMHG | BODY MASS INDEX: 41.94 KG/M2 | WEIGHT: 296.2 LBS | OXYGEN SATURATION: 96 %

## 2025-07-02 DIAGNOSIS — M1A.09X0 IDIOPATHIC CHRONIC GOUT OF MULTIPLE SITES WITHOUT TOPHUS: ICD-10-CM

## 2025-07-02 DIAGNOSIS — E04.2 MULTIPLE THYROID NODULES: ICD-10-CM

## 2025-07-02 DIAGNOSIS — E66.813 CLASS 3 OBESITY (H): ICD-10-CM

## 2025-07-02 DIAGNOSIS — Z79.4 TYPE 2 DIABETES MELLITUS WITH DIABETIC AUTONOMIC NEUROPATHY, WITH LONG-TERM CURRENT USE OF INSULIN (H): Primary | ICD-10-CM

## 2025-07-02 DIAGNOSIS — I10 HYPERTENSION, UNSPECIFIED TYPE: ICD-10-CM

## 2025-07-02 DIAGNOSIS — I10 HYPERTENSION, UNSPECIFIED TYPE: Primary | ICD-10-CM

## 2025-07-02 DIAGNOSIS — E11.43 TYPE 2 DIABETES MELLITUS WITH DIABETIC AUTONOMIC NEUROPATHY, WITH LONG-TERM CURRENT USE OF INSULIN (H): Primary | ICD-10-CM

## 2025-07-02 DIAGNOSIS — Z79.4 TYPE 2 DIABETES MELLITUS WITH DIABETIC NEPHROPATHY, WITH LONG-TERM CURRENT USE OF INSULIN (H): ICD-10-CM

## 2025-07-02 DIAGNOSIS — E26.9 HYPERALDOSTERONISM: ICD-10-CM

## 2025-07-02 DIAGNOSIS — N18.2 STAGE 2 CHRONIC KIDNEY DISEASE: ICD-10-CM

## 2025-07-02 DIAGNOSIS — Z79.4 TYPE 2 DIABETES MELLITUS WITH DIABETIC NEPHROPATHY, WITH LONG-TERM CURRENT USE OF INSULIN (H): Primary | ICD-10-CM

## 2025-07-02 DIAGNOSIS — E11.21 TYPE 2 DIABETES MELLITUS WITH DIABETIC NEPHROPATHY, WITH LONG-TERM CURRENT USE OF INSULIN (H): ICD-10-CM

## 2025-07-02 DIAGNOSIS — E11.21 TYPE 2 DIABETES MELLITUS WITH DIABETIC NEPHROPATHY, WITH LONG-TERM CURRENT USE OF INSULIN (H): Primary | ICD-10-CM

## 2025-07-02 DIAGNOSIS — E66.01 MORBID OBESITY, UNSPECIFIED OBESITY TYPE (H): ICD-10-CM

## 2025-07-02 DIAGNOSIS — Z87.891 PERSONAL HISTORY OF TOBACCO USE: ICD-10-CM

## 2025-07-02 LAB
ALBUMIN SERPL BCG-MCNC: 4.3 G/DL (ref 3.5–5.2)
ALP SERPL-CCNC: 145 U/L (ref 40–150)
ALT SERPL W P-5'-P-CCNC: 54 U/L (ref 0–70)
ANION GAP SERPL CALCULATED.3IONS-SCNC: 14 MMOL/L (ref 7–15)
AST SERPL W P-5'-P-CCNC: 52 U/L (ref 0–45)
BILIRUB SERPL-MCNC: 0.3 MG/DL
BUN SERPL-MCNC: 41 MG/DL (ref 8–23)
CALCIUM SERPL-MCNC: 10.2 MG/DL (ref 8.8–10.4)
CHLORIDE SERPL-SCNC: 104 MMOL/L (ref 98–107)
CHOLEST SERPL-MCNC: 186 MG/DL
CREAT SERPL-MCNC: 1.59 MG/DL (ref 0.67–1.17)
CREAT UR-MCNC: 79 MG/DL
EGFRCR SERPLBLD CKD-EPI 2021: 49 ML/MIN/1.73M2
EST. AVERAGE GLUCOSE BLD GHB EST-MCNC: 169 MG/DL
FASTING STATUS PATIENT QL REPORTED: YES
FASTING STATUS PATIENT QL REPORTED: YES
GLUCOSE SERPL-MCNC: 88 MG/DL (ref 70–99)
HBA1C MFR BLD: 7.5 % (ref 0–5.6)
HCO3 SERPL-SCNC: 23 MMOL/L (ref 22–29)
HCT VFR BLD AUTO: 39.1 % (ref 40–53)
HDLC SERPL-MCNC: 31 MG/DL
LDLC SERPL CALC-MCNC: 109 MG/DL
MCV RBC AUTO: 92 FL (ref 78–100)
MICROALBUMIN UR-MCNC: 959 MG/L
MICROALBUMIN/CREAT UR: 1213.92 MG/G CR (ref 0–17)
NONHDLC SERPL-MCNC: 155 MG/DL
POTASSIUM SERPL-SCNC: 4.1 MMOL/L (ref 3.4–5.3)
PROT SERPL-MCNC: 7.7 G/DL (ref 6.4–8.3)
SODIUM SERPL-SCNC: 141 MMOL/L (ref 135–145)
TRIGL SERPL-MCNC: 229 MG/DL
TSH SERPL DL<=0.005 MIU/L-ACNC: 0.96 UIU/ML (ref 0.3–4.2)
URATE SERPL-MCNC: 7.3 MG/DL (ref 3.4–7)

## 2025-07-02 PROCEDURE — 80053 COMPREHEN METABOLIC PANEL: CPT

## 2025-07-02 PROCEDURE — 82570 ASSAY OF URINE CREATININE: CPT

## 2025-07-02 PROCEDURE — 85014 HEMATOCRIT: CPT

## 2025-07-02 PROCEDURE — 83036 HEMOGLOBIN GLYCOSYLATED A1C: CPT

## 2025-07-02 PROCEDURE — 82043 UR ALBUMIN QUANTITATIVE: CPT

## 2025-07-02 PROCEDURE — 99215 OFFICE O/P EST HI 40 MIN: CPT | Performed by: INTERNAL MEDICINE

## 2025-07-02 PROCEDURE — 80061 LIPID PANEL: CPT

## 2025-07-02 PROCEDURE — 71271 CT THORAX LUNG CANCER SCR C-: CPT

## 2025-07-02 PROCEDURE — 3077F SYST BP >= 140 MM HG: CPT | Performed by: INTERNAL MEDICINE

## 2025-07-02 PROCEDURE — 3078F DIAST BP <80 MM HG: CPT | Performed by: INTERNAL MEDICINE

## 2025-07-02 PROCEDURE — 84443 ASSAY THYROID STIM HORMONE: CPT

## 2025-07-02 PROCEDURE — 95251 CONT GLUC MNTR ANALYSIS I&R: CPT | Performed by: INTERNAL MEDICINE

## 2025-07-02 PROCEDURE — 36415 COLL VENOUS BLD VENIPUNCTURE: CPT

## 2025-07-02 RX ORDER — INSULIN DEGLUDEC 200 U/ML
60 INJECTION, SOLUTION SUBCUTANEOUS DAILY
Qty: 30 ML | Refills: 3 | Status: SHIPPED | OUTPATIENT
Start: 2025-07-02

## 2025-07-02 RX ORDER — EPLERENONE 25 MG/1
25 TABLET ORAL DAILY
Qty: 90 TABLET | Refills: 1 | Status: SHIPPED | OUTPATIENT
Start: 2025-07-02

## 2025-07-02 RX ORDER — DOXAZOSIN 2 MG/1
2 TABLET ORAL AT BEDTIME
Qty: 90 TABLET | Refills: 1 | Status: SHIPPED | OUTPATIENT
Start: 2025-07-02

## 2025-07-02 RX ORDER — INSULIN ASPART 100 [IU]/ML
INJECTION, SOLUTION INTRAVENOUS; SUBCUTANEOUS
Qty: 30 ML | Refills: 3 | Status: SHIPPED | OUTPATIENT
Start: 2025-07-02

## 2025-07-02 NOTE — PATIENT INSTRUCTIONS
CALL US 4 WEEKS AFTER TAKING 5 MG MOUNJARO WEEKLY, SO WE CAN INCREASE THE DOSE   TAKE NOVOLOG FOR BREAKFAST IF YOU HAVE POTATOES, WHEAT  CHECK BP AT HOME AND CONTACT US IF PERSISTENTLY ABOVE 140/80
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
## 2025-07-02 NOTE — RESULT ENCOUNTER NOTE
The kidney function is a little lower compared with the prior results. Please try to maintain a good hydration. In addition, I prescribed doxazocin, 2 mg daily, to help with the BP.

## 2025-07-02 NOTE — NURSING NOTE
Kalia Boateng's goals for this visit include:   Chief Complaint   Patient presents with    Diabetes    Follow Up     He requests these members of his care team be copied on today's visit information: No    PCP: Scottie Boudreaux    Referring Provider:  Referred Self, MD  No address on file    BP (!) 144/77 (BP Location: Left arm, Patient Position: Sitting, Cuff Size: Adult Large)   Pulse 64   Wt 134.4 kg (296 lb 3.2 oz)   SpO2 96%   BMI 41.94 kg/m      Do you need any medication refills at today's visit? Yes

## 2025-07-08 NOTE — PROGRESS NOTES
F/U for excision of facial lesion      Subjective:  Pt feels good.  No c/o      Objective:  B/P: Data Unavailable, T: 98.7, P: 83, R: Data Unavailable  HEENT:  Incision healing well.  Sutures removed      Path -   FINAL DIAGNOSIS:   Skin, right facial skin lesion:   - Dermal nevus, excoriated and ulcerated.     Electronically signed out by:     Topher Mae M.D., PhD     Assessment/Plan:  Pt s/p excision of face lesion - doing well.  He can use his CPAP mask.  F/u PRN.    Bhanu Graham MD, FACS       DECLINED HA

## 2025-07-16 RX ORDER — ALLOPURINOL 100 MG/1
200 TABLET ORAL 2 TIMES DAILY
Qty: 360 TABLET | Refills: 4 | Status: SHIPPED | OUTPATIENT
Start: 2025-07-16

## 2025-07-18 ENCOUNTER — MYC REFILL (OUTPATIENT)
Dept: ENDOCRINOLOGY | Facility: CLINIC | Age: 60
End: 2025-07-18
Payer: COMMERCIAL

## 2025-07-18 DIAGNOSIS — E26.9 HYPERALDOSTERONISM: ICD-10-CM

## 2025-07-21 RX ORDER — EPLERENONE 25 MG/1
25 TABLET ORAL DAILY
Qty: 90 TABLET | Refills: 1 | OUTPATIENT
Start: 2025-07-21

## (undated) DEVICE — DRSG PRIMAPORE 03 1/8X6" 66000318

## (undated) DEVICE — SYR EAR BULB 2OZ

## (undated) DEVICE — PREP CHLORAPREP 26ML TINTED ORANGE  260815

## (undated) DEVICE — SU ETHILON 5-0 PS-2 18" 1666H

## (undated) DEVICE — ESU GROUND PAD UNIVERSAL W/O CORD

## (undated) DEVICE — SOL ADH LIQUID BENZOIN SWAB 0.6ML C1544

## (undated) DEVICE — GLOVE PROTEXIS W/NEU-THERA 7.5  2D73TE75

## (undated) DEVICE — DRSG PRIMAPORE 02X3" 7133

## (undated) DEVICE — PACK MINOR PROCEDURE CUSTOM

## (undated) DEVICE — DRSG STERI STRIP 1/4X3" R1541

## (undated) DEVICE — SOL NACL 0.9% IRRIG 1000ML BOTTLE 07138-09

## (undated) DEVICE — DRSG STERI STRIP 1/2X4" R1547

## (undated) DEVICE — BLADE KNIFE SURG 15 371115

## (undated) DEVICE — SOL WATER IRRIG 1000ML BOTTLE 07139-09

## (undated) DEVICE — NDL ECLIPSE 25GA 1.5"

## (undated) DEVICE — SU MONOCRYL 4-0 PS-2 18" UND Y496G

## (undated) RX ORDER — LIDOCAINE HYDROCHLORIDE AND EPINEPHRINE 10; 10 MG/ML; UG/ML
INJECTION, SOLUTION INFILTRATION; PERINEURAL
Status: DISPENSED
Start: 2017-01-24